# Patient Record
Sex: MALE | Race: WHITE | NOT HISPANIC OR LATINO | Employment: UNEMPLOYED | URBAN - METROPOLITAN AREA
[De-identification: names, ages, dates, MRNs, and addresses within clinical notes are randomized per-mention and may not be internally consistent; named-entity substitution may affect disease eponyms.]

---

## 2019-03-19 ENCOUNTER — APPOINTMENT (OUTPATIENT)
Dept: RADIOLOGY | Facility: CLINIC | Age: 59
End: 2019-03-19
Payer: COMMERCIAL

## 2019-03-19 ENCOUNTER — OFFICE VISIT (OUTPATIENT)
Dept: OBGYN CLINIC | Facility: CLINIC | Age: 59
End: 2019-03-19
Payer: COMMERCIAL

## 2019-03-19 VITALS
DIASTOLIC BLOOD PRESSURE: 75 MMHG | SYSTOLIC BLOOD PRESSURE: 199 MMHG | WEIGHT: 315 LBS | BODY MASS INDEX: 47.74 KG/M2 | HEIGHT: 68 IN | HEART RATE: 68 BPM

## 2019-03-19 DIAGNOSIS — G89.29 CHRONIC PAIN OF RIGHT KNEE: ICD-10-CM

## 2019-03-19 DIAGNOSIS — M25.561 CHRONIC PAIN OF RIGHT KNEE: ICD-10-CM

## 2019-03-19 DIAGNOSIS — M25.562 CHRONIC PAIN OF LEFT KNEE: ICD-10-CM

## 2019-03-19 DIAGNOSIS — G89.29 CHRONIC PAIN OF LEFT KNEE: ICD-10-CM

## 2019-03-19 DIAGNOSIS — M17.12 PRIMARY OSTEOARTHRITIS OF LEFT KNEE: Primary | ICD-10-CM

## 2019-03-19 DIAGNOSIS — M17.11 PRIMARY OSTEOARTHRITIS OF RIGHT KNEE: ICD-10-CM

## 2019-03-19 PROCEDURE — 20610 DRAIN/INJ JOINT/BURSA W/O US: CPT | Performed by: ORTHOPAEDIC SURGERY

## 2019-03-19 PROCEDURE — 73562 X-RAY EXAM OF KNEE 3: CPT

## 2019-03-19 PROCEDURE — 99203 OFFICE O/P NEW LOW 30 MIN: CPT | Performed by: ORTHOPAEDIC SURGERY

## 2019-03-19 RX ORDER — BUPIVACAINE HYDROCHLORIDE 2.5 MG/ML
4 INJECTION, SOLUTION INFILTRATION; PERINEURAL
Status: COMPLETED | OUTPATIENT
Start: 2019-03-19 | End: 2019-03-19

## 2019-03-19 RX ORDER — ASPIRIN 81 MG/1
81 TABLET ORAL DAILY
COMMUNITY

## 2019-03-19 RX ORDER — METHYLPREDNISOLONE ACETATE 40 MG/ML
1 INJECTION, SUSPENSION INTRA-ARTICULAR; INTRALESIONAL; INTRAMUSCULAR; SOFT TISSUE
Status: COMPLETED | OUTPATIENT
Start: 2019-03-19 | End: 2019-03-19

## 2019-03-19 RX ORDER — DICLOFENAC SODIUM 75 MG/1
75 TABLET, DELAYED RELEASE ORAL 2 TIMES DAILY WITH MEALS
Refills: 0 | COMMUNITY
Start: 2019-03-05 | End: 2019-08-27

## 2019-03-19 RX ORDER — ATENOLOL 50 MG/1
50 TABLET ORAL DAILY
Refills: 5 | COMMUNITY
Start: 2019-02-08 | End: 2020-10-25

## 2019-03-19 RX ORDER — ATORVASTATIN CALCIUM 20 MG/1
20 TABLET, FILM COATED ORAL
Refills: 5 | COMMUNITY
Start: 2019-03-07 | End: 2020-10-25

## 2019-03-19 RX ORDER — LISINOPRIL 10 MG/1
10 TABLET ORAL DAILY
Refills: 6 | COMMUNITY
Start: 2019-02-08 | End: 2020-10-25

## 2019-03-19 RX ADMIN — BUPIVACAINE HYDROCHLORIDE 4 ML: 2.5 INJECTION, SOLUTION INFILTRATION; PERINEURAL at 09:04

## 2019-03-19 RX ADMIN — METHYLPREDNISOLONE ACETATE 1 ML: 40 INJECTION, SUSPENSION INTRA-ARTICULAR; INTRALESIONAL; INTRAMUSCULAR; SOFT TISSUE at 09:02

## 2019-03-19 RX ADMIN — METHYLPREDNISOLONE ACETATE 1 ML: 40 INJECTION, SUSPENSION INTRA-ARTICULAR; INTRALESIONAL; INTRAMUSCULAR; SOFT TISSUE at 09:04

## 2019-03-19 RX ADMIN — BUPIVACAINE HYDROCHLORIDE 4 ML: 2.5 INJECTION, SOLUTION INFILTRATION; PERINEURAL at 09:02

## 2019-03-19 NOTE — PATIENT INSTRUCTIONS

## 2019-03-19 NOTE — PROGRESS NOTES
Patient Name:  Dasha Cho  MRN:  [id number]    Assessment & Plan     Bilateral Knee Pain, Severe Osteoarthritis    1  Explained to the patient the conservative treatments for above mentioned diagnosis which are intermittent cortisone and/or visco injections and the use of NSAID's  The last resort option is a total knee arthroplasty  He understood and all questions were answered  2  He was given bilateral cortisone injections today in the office for pain relief  He will continue to use Diclofenac as instructed for pain relief  3  If symptoms persist, he will call office to set up Synvisc One injections  4  He will follow up on an as needed basis  Chief Complaint     Bilateral Knee Pain      History of the Present Illness     Dasha Cho is a 62 y o  male presents today for an initial evaluation of his bilateral knees  Patient states that he has been experiencing chronic knee pain for about 5 years now, worse over the past 2 months without any certain mechanism of injury  He points to the medial aspect of both knees as the main source of his pain  Symptoms are worse with prolonged ambulation and standing  Patient notes that he has a history of a medial menisectomy performed about 6 years ago in his right knee  He states that he has also had injections, but cannot remember the type or how long ago it was   Is currently taking Diclofenac for pain relief  Denies numbness and tingling, fevers or chills    Physical Exam     BP (!) 199/75   Pulse 68   Ht 5' 8" (1 727 m)   Wt (!) 178 kg (393 lb)   BMI 59 76 kg/m²     Right knee:  Soft tissue swelling:  None  Effusion:  None  Tenderness to palpation:  Medial Joint Line  Range of motion:   Extension:  0   Flexion:  110  Lachman test:  Stable  Valgus stress:  Stable  Varus stress:  Stable  Posterior drawer test:  Stable  René's test:  Negative    Left  knee:  Soft tissue swelling:  None  Effusion:  None  Tenderness to palpation:  Medial Joint Line  Range of motion:   Extension:  0   Flexion:  110  Lachman test:  Stable  Valgus stress:  Stable  Varus stress:  Stable  Posterior drawer test:  Stable  René's test:  Negative      Eyes:  Anicteric sclerae  Neck:  Supple  Lungs:  Unlabored breathing  Cardiovascular:  Capillary refill is less than 2 seconds  Skin:  Intact without erythema  Neurologic:  Sensation intact to light touch  Psychiatric:  Mood and affect are appropriate      Large joint arthrocentesis: R knee  Date/Time: 3/19/2019 9:02 AM  Consent given by: patient  Site marked: site marked  Timeout: Immediately prior to procedure a time out was called to verify the correct patient, procedure, equipment, support staff and site/side marked as required   Supporting Documentation  Indications: pain and diagnostic evaluation   Procedure Details  Location: knee - R knee  Preparation: Patient was prepped and draped in the usual sterile fashion  Needle size: 22 G  Ultrasound guidance: no  Approach: anterolateral  Medications administered: 1 mL methylPREDNISolone acetate 40 mg/mL; 4 mL bupivacaine 0 25 %    Patient tolerance: patient tolerated the procedure well with no immediate complications  Dressing:  Sterile dressing applied    Large joint arthrocentesis: L knee  Date/Time: 3/19/2019 9:04 AM  Consent given by: patient  Site marked: site marked  Timeout: Immediately prior to procedure a time out was called to verify the correct patient, procedure, equipment, support staff and site/side marked as required   Supporting Documentation  Indications: pain and diagnostic evaluation   Procedure Details  Location: knee - L knee  Preparation: Patient was prepped and draped in the usual sterile fashion  Needle size: 22 G  Ultrasound guidance: no  Approach: anterolateral  Medications administered: 1 mL methylPREDNISolone acetate 40 mg/mL; 4 mL bupivacaine 0 25 %    Patient tolerance: patient tolerated the procedure well with no immediate complications  Dressing:  Sterile dressing applied          Data Review     I have personally reviewed pertinent films in PACS, and my interpretation follows:    X Ray Left Knee: Severe medial and patellofemoral osteoarthritis    X ray Right Knee: Severe medial and patellofemoral osteoarthritis  No past medical history on file  No past surgical history on file  No Known Allergies    Current Outpatient Medications on File Prior to Visit   Medication Sig Dispense Refill    atenolol (TENORMIN) 50 mg tablet Take 50 mg by mouth daily  5    atorvastatin (LIPITOR) 20 mg tablet Take 20 mg by mouth daily at bedtime  5    diclofenac (VOLTAREN) 75 mg EC tablet Take 75 mg by mouth 2 (two) times a day with meals  0    lisinopril (ZESTRIL) 10 mg tablet Take 10 mg by mouth daily  6     No current facility-administered medications on file prior to visit  Social History     Tobacco Use    Smoking status: Current Every Day Smoker    Smokeless tobacco: Never Used   Substance Use Topics    Alcohol use: Not on file    Drug use: Not on file       No family history on file  Review of Systems     As stated in the HPI  All other systems were reviewed and are negative        Scribe Attestation    I,:   Tanna Ocampo am acting as a scribe while in the presence of the attending physician :        I,:   Savana Davis MD personally performed the services described in this documentation    as scribed in my presence :

## 2019-04-09 ENCOUNTER — OFFICE VISIT (OUTPATIENT)
Dept: OBGYN CLINIC | Facility: CLINIC | Age: 59
End: 2019-04-09
Payer: COMMERCIAL

## 2019-04-09 VITALS
HEART RATE: 64 BPM | BODY MASS INDEX: 59.76 KG/M2 | SYSTOLIC BLOOD PRESSURE: 147 MMHG | DIASTOLIC BLOOD PRESSURE: 71 MMHG | HEIGHT: 68 IN

## 2019-04-09 DIAGNOSIS — M17.0 BILATERAL PRIMARY OSTEOARTHRITIS OF KNEE: Primary | ICD-10-CM

## 2019-04-09 PROCEDURE — 99213 OFFICE O/P EST LOW 20 MIN: CPT | Performed by: ORTHOPAEDIC SURGERY

## 2019-04-09 RX ORDER — CYCLOBENZAPRINE HCL 10 MG
10 TABLET ORAL 3 TIMES DAILY PRN
Qty: 20 TABLET | Refills: 0 | Status: SHIPPED | OUTPATIENT
Start: 2019-04-09 | End: 2020-10-27

## 2019-04-15 DIAGNOSIS — M17.0 BILATERAL PRIMARY OSTEOARTHRITIS OF KNEE: Primary | ICD-10-CM

## 2019-04-15 RX ORDER — MELOXICAM 15 MG/1
15 TABLET ORAL DAILY
Qty: 30 TABLET | Refills: 0 | Status: SHIPPED | OUTPATIENT
Start: 2019-04-15 | End: 2019-05-19 | Stop reason: SDUPTHER

## 2019-05-19 DIAGNOSIS — M17.0 BILATERAL PRIMARY OSTEOARTHRITIS OF KNEE: ICD-10-CM

## 2019-05-20 RX ORDER — MELOXICAM 15 MG/1
TABLET ORAL
Qty: 30 TABLET | Refills: 0 | Status: SHIPPED | OUTPATIENT
Start: 2019-05-20 | End: 2019-06-19 | Stop reason: SDUPTHER

## 2019-06-19 DIAGNOSIS — M17.0 BILATERAL PRIMARY OSTEOARTHRITIS OF KNEE: ICD-10-CM

## 2019-06-19 RX ORDER — MELOXICAM 15 MG/1
TABLET ORAL
Qty: 30 TABLET | Refills: 0 | Status: SHIPPED | OUTPATIENT
Start: 2019-06-19 | End: 2019-07-17 | Stop reason: SDUPTHER

## 2019-07-17 DIAGNOSIS — M17.0 BILATERAL PRIMARY OSTEOARTHRITIS OF KNEE: ICD-10-CM

## 2019-07-17 RX ORDER — MELOXICAM 15 MG/1
TABLET ORAL
Qty: 30 TABLET | Refills: 0 | Status: SHIPPED | OUTPATIENT
Start: 2019-07-17 | End: 2019-08-27

## 2019-08-27 ENCOUNTER — OFFICE VISIT (OUTPATIENT)
Dept: OBGYN CLINIC | Facility: CLINIC | Age: 59
End: 2019-08-27
Payer: COMMERCIAL

## 2019-08-27 VITALS
HEIGHT: 67 IN | HEART RATE: 47 BPM | BODY MASS INDEX: 49.44 KG/M2 | DIASTOLIC BLOOD PRESSURE: 71 MMHG | WEIGHT: 315 LBS | SYSTOLIC BLOOD PRESSURE: 153 MMHG

## 2019-08-27 DIAGNOSIS — M17.0 BILATERAL PRIMARY OSTEOARTHRITIS OF KNEE: Primary | ICD-10-CM

## 2019-08-27 PROCEDURE — 99213 OFFICE O/P EST LOW 20 MIN: CPT | Performed by: ORTHOPAEDIC SURGERY

## 2019-08-27 PROCEDURE — 20610 DRAIN/INJ JOINT/BURSA W/O US: CPT | Performed by: ORTHOPAEDIC SURGERY

## 2019-08-27 RX ORDER — LIDOCAINE HYDROCHLORIDE 10 MG/ML
4 INJECTION, SOLUTION INFILTRATION; PERINEURAL
Status: COMPLETED | OUTPATIENT
Start: 2019-08-27 | End: 2019-08-27

## 2019-08-27 RX ORDER — MELOXICAM 15 MG/1
15 TABLET ORAL DAILY
Qty: 60 TABLET | Refills: 0 | Status: SHIPPED | OUTPATIENT
Start: 2019-08-27 | End: 2019-10-24 | Stop reason: SDUPTHER

## 2019-08-27 RX ORDER — METHYLPREDNISOLONE ACETATE 40 MG/ML
1 INJECTION, SUSPENSION INTRA-ARTICULAR; INTRALESIONAL; INTRAMUSCULAR; SOFT TISSUE
Status: COMPLETED | OUTPATIENT
Start: 2019-08-27 | End: 2019-08-27

## 2019-08-27 RX ADMIN — LIDOCAINE HYDROCHLORIDE 4 ML: 10 INJECTION, SOLUTION INFILTRATION; PERINEURAL at 11:53

## 2019-08-27 RX ADMIN — METHYLPREDNISOLONE ACETATE 1 ML: 40 INJECTION, SUSPENSION INTRA-ARTICULAR; INTRALESIONAL; INTRAMUSCULAR; SOFT TISSUE at 11:53

## 2019-08-27 NOTE — PROGRESS NOTES
Patient Name:  Marifer Clarke  MRN:  [id number]    Assessment & Plan     Bilateral knee DJD  1  Due to patient's Visco supplementation approval being rejected, we will provide him with corticosteroid injections bilaterally  Instructions were provided in office, and tolerated well  2  Renewal for meloxicam was placed in office today and sent to patient's pharmacy  3  I would be happy to see the patient back in office after he undergoes gastric bypass surgery in December      Subjective     Patient is a 51-year-old male who presents the office today for a follow-up evaluation of his bilateral knees  He last received corticosteroid injections in March of 2019  He states this provided him about 2 months of relief  Patient was undergoing the process for approval for viscosupplementation  He received a phone call recently that stated he was unable to receive these injections, as insurance would not approve them  He notes his pain is to the medial aspect of his knee and at this point in time is constant  He ambulates with the assistance of a walker  He notes no new incidence of injury  Patient states he has been taking meloxicam for relief however he has run out of this medication  Patient is likely undergoing gastric bypass surgery in December  General ROS:  Negative for fever or chills  Neurological ROS:  Negative for numbness or tingling        Objective     /71   Pulse (!) 47   Ht 5' 6 5" (1 689 m)   Wt (!) 171 kg (377 lb)   BMI 59 94 kg/m²     Right knee:  Soft tissue swelling: None  Effusion: None  Tenderness to palpation: medial joint line   Range of motion:  Extension: 0, reproduces pain   Flexion: 110  Lachman test: Stable  Valgus stress: Stable  Varus stress: Stable  Posterior drawer test: Stable  René's test: Negative  Patellar grind test: Negative    Left  knee:  Soft tissue swelling: None  Effusion: None  Tenderness to palpation: medial joint line   Range of motion:  Extension: 0, reproduces pain   Flexion: 110  Lachman test: Stable  Valgus stress: Stable  Varus stress: Stable  Posterior drawer test: Stable  René's test: Negative  Patellar grind test: Negative    Constitutional: Well-developed and well-nourished  Eyes: Anicteric sclerae  Lungs: Unlabored breathing  Cardiovascular: Capillary refill is less than 2 seconds  Skin: Intact without erythema  Neurologic: Sensation intact to light touch  Psychiatric: Mood and affect are appropriate        Large joint arthrocentesis: bilateral knee  Date/Time: 8/27/2019 11:53 AM  Consent given by: patient  Site marked: site marked  Timeout: Immediately prior to procedure a time out was called to verify the correct patient, procedure, equipment, support staff and site/side marked as required   Supporting Documentation  Indications: pain   Procedure Details  Location: knee - bilateral knee  Preparation: Patient was prepped and draped in the usual sterile fashion  Ultrasound guidance: no    Medications (Right): 4 mL lidocaine 1 %; 1 mL methylPREDNISolone acetate 40 mg/mLMedications (Left): 4 mL lidocaine 1 %; 1 mL methylPREDNISolone acetate 40 mg/mL   Patient tolerance: patient tolerated the procedure well with no immediate complications  Dressing:  Sterile dressing applied          Social History     Tobacco Use    Smoking status: Current Every Day Smoker    Smokeless tobacco: Never Used   Substance Use Topics    Alcohol use: Not on file    Drug use: Not on file       Scribe Attestation    I,:   Danell Mohs, MA am acting as a scribe while in the presence of the attending physician :        I,:   Lisandro Suarez MD personally performed the services described in this documentation    as scribed in my presence :

## 2019-10-23 ENCOUNTER — TELEPHONE (OUTPATIENT)
Dept: OBGYN CLINIC | Facility: HOSPITAL | Age: 59
End: 2019-10-23

## 2019-10-23 NOTE — TELEPHONE ENCOUNTER
Pt contacted Call Center requested refill of their medication  Medication Name:  Meloxicam     Dosage of Med:  15 mg     Frequency of Med:  Once a day     Remaining Medication:  4 left     Pharmacy and Location:  [facility] on [address] [place] [address] in [place]       Pt  Preferred Callback Phone Number:  [phone]    Thank you

## 2019-10-24 DIAGNOSIS — M17.0 BILATERAL PRIMARY OSTEOARTHRITIS OF KNEE: ICD-10-CM

## 2019-10-24 DIAGNOSIS — M17.0 PRIMARY OSTEOARTHRITIS OF BOTH KNEES: Primary | ICD-10-CM

## 2019-10-24 RX ORDER — MELOXICAM 15 MG/1
15 TABLET ORAL DAILY
Qty: 60 TABLET | Refills: 0 | Status: SHIPPED | OUTPATIENT
Start: 2019-10-24 | End: 2019-12-16 | Stop reason: SDUPTHER

## 2019-12-16 DIAGNOSIS — M17.0 BILATERAL PRIMARY OSTEOARTHRITIS OF KNEE: ICD-10-CM

## 2019-12-16 RX ORDER — MELOXICAM 15 MG/1
TABLET ORAL
Qty: 30 TABLET | Refills: 1 | Status: SHIPPED | OUTPATIENT
Start: 2019-12-16 | End: 2020-11-30 | Stop reason: SDUPTHER

## 2020-10-25 DIAGNOSIS — Z76.0 ENCOUNTER FOR ISSUE OF REPEAT PRESCRIPTION: ICD-10-CM

## 2020-10-25 DIAGNOSIS — I10 ESSENTIAL (PRIMARY) HYPERTENSION: ICD-10-CM

## 2020-10-25 RX ORDER — ATENOLOL 50 MG/1
TABLET ORAL
Qty: 90 TABLET | Refills: 0 | Status: SHIPPED | OUTPATIENT
Start: 2020-10-25 | End: 2021-01-23

## 2020-10-25 RX ORDER — LISINOPRIL 10 MG/1
TABLET ORAL
Qty: 90 TABLET | Refills: 0 | Status: SHIPPED | OUTPATIENT
Start: 2020-10-25 | End: 2021-01-23

## 2020-10-25 RX ORDER — ATORVASTATIN CALCIUM 20 MG/1
TABLET, FILM COATED ORAL
Qty: 90 TABLET | Refills: 0 | Status: SHIPPED | OUTPATIENT
Start: 2020-10-25 | End: 2021-01-23

## 2020-10-27 ENCOUNTER — OFFICE VISIT (OUTPATIENT)
Dept: FAMILY MEDICINE CLINIC | Facility: CLINIC | Age: 60
End: 2020-10-27
Payer: COMMERCIAL

## 2020-10-27 VITALS
TEMPERATURE: 97.1 F | BODY MASS INDEX: 49.44 KG/M2 | HEART RATE: 56 BPM | SYSTOLIC BLOOD PRESSURE: 124 MMHG | OXYGEN SATURATION: 99 % | WEIGHT: 315 LBS | HEIGHT: 67 IN | DIASTOLIC BLOOD PRESSURE: 80 MMHG

## 2020-10-27 DIAGNOSIS — S81.802A OPEN WOUND OF BOTH LOWER EXTREMITIES WITH COMPLICATION, INITIAL ENCOUNTER: ICD-10-CM

## 2020-10-27 DIAGNOSIS — E78.2 MIXED HYPERLIPIDEMIA: ICD-10-CM

## 2020-10-27 DIAGNOSIS — R06.00 DYSPNEA ON EXERTION: ICD-10-CM

## 2020-10-27 DIAGNOSIS — G47.33 OSA (OBSTRUCTIVE SLEEP APNEA): ICD-10-CM

## 2020-10-27 DIAGNOSIS — I87.2 VENOUS INSUFFICIENCY: ICD-10-CM

## 2020-10-27 DIAGNOSIS — E66.01 MORBID OBESITY (HCC): ICD-10-CM

## 2020-10-27 DIAGNOSIS — Z23 NEEDS FLU SHOT: ICD-10-CM

## 2020-10-27 DIAGNOSIS — E55.9 VITAMIN D DEFICIENCY: ICD-10-CM

## 2020-10-27 DIAGNOSIS — M17.0 PRIMARY OSTEOARTHRITIS OF BOTH KNEES: ICD-10-CM

## 2020-10-27 DIAGNOSIS — I10 ESSENTIAL HYPERTENSION: Primary | ICD-10-CM

## 2020-10-27 DIAGNOSIS — M17.0 OSTEOARTHRITIS OF BOTH KNEES, UNSPECIFIED OSTEOARTHRITIS TYPE: ICD-10-CM

## 2020-10-27 DIAGNOSIS — R26.2 AMBULATORY DYSFUNCTION: ICD-10-CM

## 2020-10-27 DIAGNOSIS — S81.801A OPEN WOUND OF BOTH LOWER EXTREMITIES WITH COMPLICATION, INITIAL ENCOUNTER: ICD-10-CM

## 2020-10-27 DIAGNOSIS — F17.200 TOBACCO DEPENDENCE: ICD-10-CM

## 2020-10-27 DIAGNOSIS — R60.0 BILATERAL LOWER EXTREMITY EDEMA: ICD-10-CM

## 2020-10-27 PROBLEM — L03.90 CHRONIC CELLULITIS: Status: ACTIVE | Noted: 2019-07-19

## 2020-10-27 PROCEDURE — 3725F SCREEN DEPRESSION PERFORMED: CPT | Performed by: FAMILY MEDICINE

## 2020-10-27 PROCEDURE — 90682 RIV4 VACC RECOMBINANT DNA IM: CPT

## 2020-10-27 PROCEDURE — 99214 OFFICE O/P EST MOD 30 MIN: CPT | Performed by: FAMILY MEDICINE

## 2020-10-27 PROCEDURE — 3079F DIAST BP 80-89 MM HG: CPT | Performed by: FAMILY MEDICINE

## 2020-10-27 PROCEDURE — 3074F SYST BP LT 130 MM HG: CPT | Performed by: FAMILY MEDICINE

## 2020-10-27 PROCEDURE — 3008F BODY MASS INDEX DOCD: CPT | Performed by: FAMILY MEDICINE

## 2020-10-27 PROCEDURE — 90471 IMMUNIZATION ADMIN: CPT

## 2020-11-30 DIAGNOSIS — M17.0 BILATERAL PRIMARY OSTEOARTHRITIS OF KNEE: ICD-10-CM

## 2020-11-30 RX ORDER — MELOXICAM 15 MG/1
15 TABLET ORAL DAILY
Qty: 30 TABLET | Refills: 1 | Status: SHIPPED | OUTPATIENT
Start: 2020-11-30 | End: 2021-01-31

## 2020-12-07 DIAGNOSIS — R09.81 HEAD CONGESTION: Primary | ICD-10-CM

## 2020-12-07 RX ORDER — AZITHROMYCIN 250 MG/1
TABLET, FILM COATED ORAL
Qty: 6 TABLET | Refills: 0 | Status: SHIPPED | OUTPATIENT
Start: 2020-12-07 | End: 2020-12-12

## 2021-01-05 PROBLEM — M17.0 OSTEOARTHRITIS OF BOTH KNEES: Status: RESOLVED | Noted: 2019-05-07 | Resolved: 2021-01-05

## 2021-01-05 PROBLEM — L03.90 CHRONIC CELLULITIS: Status: RESOLVED | Noted: 2019-07-19 | Resolved: 2021-01-05

## 2021-01-13 ENCOUNTER — OFFICE VISIT (OUTPATIENT)
Dept: FAMILY MEDICINE CLINIC | Facility: CLINIC | Age: 61
End: 2021-01-13
Payer: COMMERCIAL

## 2021-01-13 VITALS
WEIGHT: 315 LBS | OXYGEN SATURATION: 96 % | HEART RATE: 72 BPM | RESPIRATION RATE: 24 BRPM | HEIGHT: 67 IN | TEMPERATURE: 98.7 F | SYSTOLIC BLOOD PRESSURE: 128 MMHG | DIASTOLIC BLOOD PRESSURE: 70 MMHG | BODY MASS INDEX: 49.44 KG/M2

## 2021-01-13 DIAGNOSIS — I83.029 VENOUS STASIS ULCERS OF BOTH LOWER EXTREMITIES (HCC): ICD-10-CM

## 2021-01-13 DIAGNOSIS — E66.01 MORBID OBESITY (HCC): ICD-10-CM

## 2021-01-13 DIAGNOSIS — L97.929 VENOUS STASIS ULCERS OF BOTH LOWER EXTREMITIES (HCC): ICD-10-CM

## 2021-01-13 DIAGNOSIS — R60.0 BILATERAL LOWER EXTREMITY EDEMA: ICD-10-CM

## 2021-01-13 DIAGNOSIS — L97.919 VENOUS STASIS ULCERS OF BOTH LOWER EXTREMITIES (HCC): ICD-10-CM

## 2021-01-13 DIAGNOSIS — E78.2 MIXED HYPERLIPIDEMIA: ICD-10-CM

## 2021-01-13 DIAGNOSIS — M17.0 PRIMARY OSTEOARTHRITIS OF BOTH KNEES: ICD-10-CM

## 2021-01-13 DIAGNOSIS — I83.019 VENOUS STASIS ULCERS OF BOTH LOWER EXTREMITIES (HCC): ICD-10-CM

## 2021-01-13 DIAGNOSIS — E55.9 VITAMIN D DEFICIENCY: ICD-10-CM

## 2021-01-13 DIAGNOSIS — G47.33 OSA (OBSTRUCTIVE SLEEP APNEA): ICD-10-CM

## 2021-01-13 DIAGNOSIS — I10 ESSENTIAL HYPERTENSION: Primary | ICD-10-CM

## 2021-01-13 PROCEDURE — 99214 OFFICE O/P EST MOD 30 MIN: CPT | Performed by: FAMILY MEDICINE

## 2021-01-13 RX ORDER — FUROSEMIDE 20 MG/1
TABLET ORAL
Qty: 15 TABLET | Refills: 3 | Status: SHIPPED | OUTPATIENT
Start: 2021-01-13 | End: 2021-05-12

## 2021-01-13 NOTE — ASSESSMENT & PLAN NOTE
Pt takes meloxicam 15 mg qd and has seen Dr Shawn Barnes  Pt needs to lose wt before TKRs can be done

## 2021-01-13 NOTE — ASSESSMENT & PLAN NOTE
Not using CPAP due to feeling like smothered with mask  Discussed referral to Pulm for other options for tx

## 2021-01-13 NOTE — ASSESSMENT & PLAN NOTE
Pt has it chronically and has open areas b l  Will start lasix 20 3 days a week  And pt needs to lose wt  Will refer to [facility]

## 2021-01-13 NOTE — PROGRESS NOTES
BMI Counseling: Body mass index is 67 57 kg/m²  The BMI is above normal  Nutrition recommendations include decreasing portion sizes, encouraging healthy choices of fruits and vegetables, consuming healthier snacks and moderation in carbohydrate intake  Exercise recommendations include exercising 3-5 times per week  No pharmacotherapy was ordered  Assessment/Plan:         Problem List Items Addressed This Visit        Respiratory    CASSIE (obstructive sleep apnea)     Not using CPAP due to feeling like smothered with mask  Discussed referral to Pulm for other options for tx  Cardiovascular and Mediastinum    Essential hypertension - Primary     BP good  Cont current meds  Inc wt loss  Will check labs  Relevant Medications    furosemide (LASIX) 20 mg tablet    Other Relevant Orders    Comprehensive metabolic panel    CBC and differential       Musculoskeletal and Integument    Venous stasis ulcers of both lower extremities ([facility] [address] )     Refer to [facility]  Relevant Orders    Ambulatory referral to Wound Care    Primary osteoarthritis of both knees     Pt takes meloxicam 15 mg qd and has seen Dr Víctor Andrew  Pt needs to lose wt before TKRs can be done  Other    Vitamin D deficiency     Recheck level  Not on meds  Relevant Orders    Vitamin D 25 hydroxy    Morbid obesity ([facility] [address] )     Pt needs to lose wt  Was following up with Bariatric Surg but insurance changed  Mixed hyperlipidemia     Recheck lipids and LFTs  Cont atorvastatin 20 mg qhs  Relevant Orders    Lipid panel    Bilateral lower extremity edema     Pt has it chronically and has open areas b l  Will start lasix 20 3 days a week  And pt needs to lose wt  Will refer to [facility]  Relevant Medications    furosemide (LASIX) 20 mg tablet            Subjective:      Patient ID: Jerri Spatz is a 61 y o  male  Pt here for f/u HTN, HL, Vit D def, OA, CASSIE, Morbid Obesity  Doing ok  No cp   Gets mild sob when exerts himself  Worse when wt is up  Uses walker to ambulate  Has knee pain b/l and has seen Dr Harvey Krabbe  Has had injections and needs TKRs, but needs to lose wt first  Not using CPAP  The following portions of the patient's history were reviewed and updated as appropriate:   Past Medical History:  He has a past medical history of Essential hypertension, Hyperlipidemia, Lower extremity edema, Obesity, CASSIE (obstructive sleep apnea), Osteoarthritis of both knees, PSVT (paroxysmal supraventricular tachycardia) ([address] ), and Vitamin D deficiency  ,  _______________________________________________________________________  Medical Problems:  does not have any pertinent problems on file ,  _______________________________________________________________________  Past Surgical History:   has a past surgical history that includes Knee surgery and Atrial ablation surgery  ,  _______________________________________________________________________  Family History:  family history includes Alzheimer's disease in his mother; Asthma in his sister; Diabetes in his mother; Heart disease in his father ,  _______________________________________________________________________  Social History:   reports that he quit smoking today  His smoking use included cigarettes  He has a 15 00 pack-year smoking history  He has never used smokeless tobacco  He reports current alcohol use  He reports previous drug use ,  _______________________________________________________________________  Allergies:  has No Known Allergies     _______________________________________________________________________  Current Outpatient Medications   Medication Sig Dispense Refill    aspirin (ECOTRIN LOW STRENGTH) 81 mg EC tablet Take 81 mg by mouth daily      atenolol (TENORMIN) 50 mg tablet TAKE 1 TABLET BY MOUTH EVERY DAY 90 tablet 0    atorvastatin (LIPITOR) 20 mg tablet TAKE 1 TABLET BY MOUTH EVERYDAY AT BEDTIME 90 tablet 0    Cyanocobalamin (VITAMIN B-12 PO) Take by mouth      lisinopril (ZESTRIL) 10 mg tablet TAKE 1 TABLET BY MOUTH EVERY DAY 90 tablet 0    meloxicam (MOBIC) 15 mg tablet Take 1 tablet (15 mg total) by mouth daily 30 tablet 1    furosemide (LASIX) 20 mg tablet Take 1 tab every other day  15 tablet 3     No current facility-administered medications for this visit       _______________________________________________________________________  Review of Systems   Constitutional: Negative for fatigue and unexpected weight change  Respiratory: Positive for shortness of breath  Negative for cough  Cardiovascular: Positive for leg swelling  Negative for chest pain  Gastrointestinal: Negative for abdominal pain, constipation, diarrhea and vomiting  Musculoskeletal: Positive for arthralgias and gait problem  Skin:        Legs have blisters and drain   Neurological: Negative for dizziness and headaches  Psychiatric/Behavioral: Negative for dysphoric mood  The patient is not nervous/anxious  Objective:  Vitals:    01/13/21 1027   BP: 128/70   BP Location: Right arm   Patient Position: Sitting   Pulse: 72   Resp: (!) 24   Temp: 98 7 °F (37 1 °C)   SpO2: 96%   Weight: (!) 193 kg (425 lb)   Height: 5' 6 5" (1 689 m)     Body mass index is 67 57 kg/m²  Physical Exam  Vitals signs and nursing note reviewed  Constitutional:       Appearance: Normal appearance  He is well-developed  He is obese  Comments: Walks with walker   Neck:      Musculoskeletal: Normal range of motion and neck supple  Thyroid: No thyromegaly  Cardiovascular:      Rate and Rhythm: Normal rate and regular rhythm  Heart sounds: Normal heart sounds  No murmur  Pulmonary:      Effort: Pulmonary effort is normal  No respiratory distress  Breath sounds: Normal breath sounds  No wheezing  Musculoskeletal:      Right lower leg: Edema present  Left lower leg: Edema present  Lymphadenopathy:      Cervical: No cervical adenopathy  Skin:     Comments: Pt has open areas draining serosanguinous fluid b/l LEs and worse on R    Neurological:      Mental Status: He is alert and oriented to person, place, and time  Cranial Nerves: No cranial nerve deficit  Psychiatric:         Mood and Affect: Mood normal          Behavior: Behavior normal          Thought Content:  Thought content normal          Judgment: Judgment normal

## 2021-01-20 ENCOUNTER — TELEPHONE (OUTPATIENT)
Dept: FAMILY MEDICINE CLINIC | Facility: CLINIC | Age: 61
End: 2021-01-20

## 2021-01-20 NOTE — TELEPHONE ENCOUNTER
Seen one week ago, was told wound care would call him to set up appt  , but they haven't yet  What should he do, or is there a number for them?

## 2021-01-23 DIAGNOSIS — Z76.0 ENCOUNTER FOR ISSUE OF REPEAT PRESCRIPTION: ICD-10-CM

## 2021-01-23 DIAGNOSIS — I10 ESSENTIAL (PRIMARY) HYPERTENSION: ICD-10-CM

## 2021-01-23 RX ORDER — ATORVASTATIN CALCIUM 20 MG/1
TABLET, FILM COATED ORAL
Qty: 90 TABLET | Refills: 0 | Status: SHIPPED | OUTPATIENT
Start: 2021-01-23 | End: 2021-05-02

## 2021-01-23 RX ORDER — ATENOLOL 50 MG/1
TABLET ORAL
Qty: 90 TABLET | Refills: 0 | Status: SHIPPED | OUTPATIENT
Start: 2021-01-23 | End: 2021-05-02

## 2021-01-23 RX ORDER — LISINOPRIL 10 MG/1
TABLET ORAL
Qty: 90 TABLET | Refills: 0 | Status: SHIPPED | OUTPATIENT
Start: 2021-01-23 | End: 2021-05-02

## 2021-01-31 DIAGNOSIS — M17.0 BILATERAL PRIMARY OSTEOARTHRITIS OF KNEE: ICD-10-CM

## 2021-01-31 RX ORDER — MELOXICAM 15 MG/1
TABLET ORAL
Qty: 30 TABLET | Refills: 1 | Status: SHIPPED | OUTPATIENT
Start: 2021-01-31 | End: 2021-04-02

## 2021-03-10 DIAGNOSIS — Z23 ENCOUNTER FOR IMMUNIZATION: ICD-10-CM

## 2021-03-15 ENCOUNTER — TELEPHONE (OUTPATIENT)
Dept: OTHER | Facility: OTHER | Age: 61
End: 2021-03-15

## 2021-03-22 ENCOUNTER — IMMUNIZATIONS (OUTPATIENT)
Dept: FAMILY MEDICINE CLINIC | Facility: HOSPITAL | Age: 61
End: 2021-03-22

## 2021-03-22 DIAGNOSIS — Z23 ENCOUNTER FOR IMMUNIZATION: Primary | ICD-10-CM

## 2021-03-22 PROCEDURE — 0001A SARS-COV-2 / COVID-19 MRNA VACCINE (PFIZER-BIONTECH) 30 MCG: CPT

## 2021-03-22 PROCEDURE — 91300 SARS-COV-2 / COVID-19 MRNA VACCINE (PFIZER-BIONTECH) 30 MCG: CPT

## 2021-04-02 DIAGNOSIS — M17.0 BILATERAL PRIMARY OSTEOARTHRITIS OF KNEE: ICD-10-CM

## 2021-04-02 RX ORDER — MELOXICAM 15 MG/1
TABLET ORAL
Qty: 30 TABLET | Refills: 1 | Status: SHIPPED | OUTPATIENT
Start: 2021-04-02 | End: 2021-05-28

## 2021-04-13 ENCOUNTER — HOSPITAL ENCOUNTER (OUTPATIENT)
Dept: VASCULAR ULTRASOUND | Facility: HOSPITAL | Age: 61
Discharge: HOME | End: 2021-04-13
Payer: MEDICARE

## 2021-04-13 ENCOUNTER — OFFICE VISIT (OUTPATIENT)
Dept: FAMILY MEDICINE CLINIC | Facility: CLINIC | Age: 61
End: 2021-04-13
Payer: MEDICARE

## 2021-04-13 VITALS
SYSTOLIC BLOOD PRESSURE: 120 MMHG | DIASTOLIC BLOOD PRESSURE: 70 MMHG | TEMPERATURE: 97.9 F | HEART RATE: 52 BPM | HEIGHT: 67 IN | WEIGHT: 315 LBS | BODY MASS INDEX: 49.44 KG/M2 | OXYGEN SATURATION: 94 %

## 2021-04-13 DIAGNOSIS — R60.0 BILATERAL LOWER EXTREMITY EDEMA: ICD-10-CM

## 2021-04-13 DIAGNOSIS — E66.01 MORBID OBESITY (HCC): ICD-10-CM

## 2021-04-13 DIAGNOSIS — R60.0 EDEMA OF LEFT UPPER ARM: ICD-10-CM

## 2021-04-13 DIAGNOSIS — I10 ESSENTIAL HYPERTENSION: Primary | ICD-10-CM

## 2021-04-13 DIAGNOSIS — G47.33 OSA (OBSTRUCTIVE SLEEP APNEA): ICD-10-CM

## 2021-04-13 DIAGNOSIS — E78.2 MIXED HYPERLIPIDEMIA: ICD-10-CM

## 2021-04-13 DIAGNOSIS — M17.0 PRIMARY OSTEOARTHRITIS OF BOTH KNEES: ICD-10-CM

## 2021-04-13 DIAGNOSIS — E55.9 VITAMIN D DEFICIENCY: ICD-10-CM

## 2021-04-13 PROBLEM — M79.89 LEFT ARM SWELLING: Status: ACTIVE | Noted: 2021-04-13

## 2021-04-13 PROBLEM — F17.200 TOBACCO DEPENDENCE: Status: RESOLVED | Noted: 2019-05-07 | Resolved: 2021-04-13

## 2021-04-13 PROBLEM — Z87.891 FORMER SMOKER: Status: ACTIVE | Noted: 2021-04-13

## 2021-04-13 PROCEDURE — 93971 EXTREMITY STUDY: CPT

## 2021-04-13 PROCEDURE — 99214 OFFICE O/P EST MOD 30 MIN: CPT | Performed by: FAMILY MEDICINE

## 2021-04-13 PROCEDURE — 93971 EXTREMITY STUDY: CPT | Performed by: SURGERY

## 2021-04-13 RX ORDER — VITAMIN B COMPLEX
TABLET ORAL DAILY
COMMUNITY
End: 2021-06-15 | Stop reason: HOSPADM

## 2021-04-13 RX ORDER — FOLIC ACID 1 MG/1
TABLET ORAL
COMMUNITY
End: 2021-06-15 | Stop reason: HOSPADM

## 2021-04-13 NOTE — ASSESSMENT & PLAN NOTE
Pt has been going to [facility] and open areas almost healed  Pt takes lasix 20 mg qd and going to use compression stockings  Needs to lose weight

## 2021-04-13 NOTE — ASSESSMENT & PLAN NOTE
Not using CPAP due to feeling like smothered with mask  Pt advised to discuss options with Pulmonary

## 2021-04-13 NOTE — ASSESSMENT & PLAN NOTE
Pt takes meloxicam 15 mg qd and has seen Dr Sarah Villalta  Pt needs to lose wt before TKRs can be done

## 2021-04-13 NOTE — PROGRESS NOTES
Assessment/Plan:         Problem List Items Addressed This Visit        Respiratory    CASSIE (obstructive sleep apnea)     Not using CPAP due to feeling like smothered with mask  Pt advised to discuss options with Pulmonary  Cardiovascular and Mediastinum    Essential hypertension - Primary     BP good  Continue current meds  Stressed to pt to lose weight  Has gained 32 lbs since last visit  Musculoskeletal and Integument    Primary osteoarthritis of both knees     Pt takes meloxicam 15 mg qd and has seen Dr Mary Redd  Pt needs to lose wt before TKRs can be done  Other    Vitamin D deficiency     Recheck level  Takes 1000 U qd  Morbid obesity ([address] )     Pt gained 32 lbs since last visit and needs to lose wt  Was following up with Bariatric Surg but insurance changed  Pt needs to check with new insurance  Would benefit from bariatric procedure  Mixed hyperlipidemia     Recheck lipids and LFTs  Cont atorvastatin 20 mg qhs  Edema of left upper arm     Pt has had it for 2-3 wks  No injury or pain  No signs of infection  Will check venous duplex to eval for DVT  Relevant Orders    VAS upper limb venous duplex scan, unilateral/limited    Bilateral lower extremity edema     Pt has been going to [facility] and open areas almost healed  Pt takes lasix 20 mg qd and going to use compression stockings  Needs to lose weight  Subjective:      Patient ID: Dimas Mart is a 61 y o  male  Pt here for f/u HTN, HL, CASSIE, OA knees, Leg edema, Vit D def, Obesity  Doing ok  No cp/sob  No headaches  No cp  Gets sob when exerts himself  Pt stopped smoking in Jan 2021 and has gained 32 lbs  Hasn't been using CPAP  Wakes up a lot  Has been going to [address] and open areas are healing  Pt sees them q wk and gets legs wrapped  Pt has had swelling of L hand and forearm for past 2-3 weeks  No injury or pain  Never had it before         The following portions of the patient's history were reviewed and updated as appropriate:   Past Medical History:  He has a past medical history of Essential hypertension, Hyperlipidemia, Hypertension, Lower extremity edema, Obesity, CASSIE (obstructive sleep apnea), Osteoarthritis of both knees, PSVT (paroxysmal supraventricular tachycardia) ([address] ), and Vitamin D deficiency  ,  _______________________________________________________________________  Medical Problems:  does not have any pertinent problems on file ,  _______________________________________________________________________  Past Surgical History:   has a past surgical history that includes Knee surgery and Atrial ablation surgery  ,  _______________________________________________________________________  Family History:  family history includes Alzheimer's disease in his mother; Asthma in his sister; Diabetes in his mother; Heart disease in his father; No Known Problems in his brother, sister, and sister  ,  _______________________________________________________________________  Social History:   reports that he quit smoking about 2 months ago  His smoking use included cigarettes  He has a 15 00 pack-year smoking history  He has never used smokeless tobacco  He reports current alcohol use  He reports previous drug use ,  _______________________________________________________________________  Allergies:  has No Known Allergies     _______________________________________________________________________  Current Outpatient Medications   Medication Sig Dispense Refill    aspirin (ECOTRIN LOW STRENGTH) 81 mg EC tablet Take 81 mg by mouth daily      atenolol (TENORMIN) 50 mg tablet TAKE 1 TABLET BY MOUTH EVERY DAY 90 tablet 0    atorvastatin (LIPITOR) 20 mg tablet TAKE 1 TABLET BY MOUTH EVERYDAY AT BEDTIME 90 tablet 0    cholecalciferol (VITAMIN D3) 25 mcg (1,000 units) tablet Take by mouth daily      Cyanocobalamin (VITAMIN B-12 PO) Take by mouth      folic acid (FOLVITE) 1 mg tablet  furosemide (LASIX) 20 mg tablet Take 1 tab every other day  15 tablet 3    lisinopril (ZESTRIL) 10 mg tablet TAKE 1 TABLET BY MOUTH EVERY DAY 90 tablet 0    meloxicam (MOBIC) 15 mg tablet TAKE 1 TABLET BY MOUTH EVERY DAY 30 tablet 1     No current facility-administered medications for this visit       _______________________________________________________________________  Review of Systems   Constitutional: Negative for fatigue and unexpected weight change  Respiratory: Positive for shortness of breath  Negative for cough  Cardiovascular: Positive for leg swelling  Negative for chest pain  Gastrointestinal: Negative for abdominal pain, constipation, diarrhea and vomiting  Musculoskeletal: Positive for arthralgias and gait problem  Skin:        Legs have blisters and drain   Neurological: Negative for dizziness and headaches  Psychiatric/Behavioral: Negative for dysphoric mood  The patient is not nervous/anxious  Objective:  Vitals:    04/13/21 1029   BP: 120/70   Pulse: (!) 52   Temp: 97 9 °F (36 6 °C)   SpO2: 94%   Weight: (!) 207 kg (457 lb)   Height: 5' 6 5" (1 689 m)     Body mass index is 72 66 kg/m²  Physical Exam  Vitals signs and nursing note reviewed  Constitutional:       Appearance: Normal appearance  He is well-developed  He is obese  Comments: Walks with walker   Neck:      Musculoskeletal: Normal range of motion and neck supple  Thyroid: No thyromegaly  Cardiovascular:      Rate and Rhythm: Normal rate and regular rhythm  Heart sounds: Normal heart sounds  No murmur  Pulmonary:      Effort: Pulmonary effort is normal  No respiratory distress  Breath sounds: Normal breath sounds  No wheezing  Musculoskeletal:      Right lower leg: Edema present  Left lower leg: Edema present  Comments: L hand and forearm edema, no redness, pain, or warmth   Lymphadenopathy:      Cervical: No cervical adenopathy     Skin:     Comments: Pt has open areas draining serosanguinous fluid b/l LEs and worse on R    Neurological:      Mental Status: He is alert and oriented to person, place, and time  Cranial Nerves: No cranial nerve deficit  Psychiatric:         Mood and Affect: Mood normal          Behavior: Behavior normal          Thought Content:  Thought content normal          Judgment: Judgment normal

## 2021-04-13 NOTE — ASSESSMENT & PLAN NOTE
Pt has had it for 2-3 wks  No injury or pain  No signs of infection  Will check venous duplex to eval for DVT

## 2021-04-13 NOTE — ASSESSMENT & PLAN NOTE
Pt gained 32 lbs since last visit and needs to lose wt  Was following up with Bariatric Surg but insurance changed  Pt needs to check with new insurance  Would benefit from bariatric procedure

## 2021-04-15 ENCOUNTER — IMMUNIZATIONS (OUTPATIENT)
Dept: FAMILY MEDICINE CLINIC | Facility: HOSPITAL | Age: 61
End: 2021-04-15

## 2021-04-15 DIAGNOSIS — Z23 ENCOUNTER FOR IMMUNIZATION: Primary | ICD-10-CM

## 2021-04-15 PROCEDURE — 0002A SARS-COV-2 / COVID-19 MRNA VACCINE (PFIZER-BIONTECH) 30 MCG: CPT

## 2021-04-15 PROCEDURE — 91300 SARS-COV-2 / COVID-19 MRNA VACCINE (PFIZER-BIONTECH) 30 MCG: CPT

## 2021-05-02 DIAGNOSIS — I10 ESSENTIAL (PRIMARY) HYPERTENSION: ICD-10-CM

## 2021-05-02 DIAGNOSIS — Z76.0 ENCOUNTER FOR ISSUE OF REPEAT PRESCRIPTION: ICD-10-CM

## 2021-05-02 RX ORDER — ATENOLOL 50 MG/1
TABLET ORAL
Qty: 90 TABLET | Refills: 0 | Status: SHIPPED | OUTPATIENT
Start: 2021-05-02 | End: 2021-06-15 | Stop reason: HOSPADM

## 2021-05-02 RX ORDER — ATORVASTATIN CALCIUM 20 MG/1
TABLET, FILM COATED ORAL
Qty: 90 TABLET | Refills: 0 | Status: SHIPPED | OUTPATIENT
Start: 2021-05-02 | End: 2021-07-12

## 2021-05-02 RX ORDER — LISINOPRIL 10 MG/1
TABLET ORAL
Qty: 90 TABLET | Refills: 0 | Status: ON HOLD | OUTPATIENT
Start: 2021-05-02 | End: 2021-06-15 | Stop reason: SDUPTHER

## 2021-05-12 DIAGNOSIS — R60.0 BILATERAL LOWER EXTREMITY EDEMA: ICD-10-CM

## 2021-05-12 RX ORDER — FUROSEMIDE 20 MG/1
TABLET ORAL
Qty: 45 TABLET | Refills: 1 | Status: SHIPPED | OUTPATIENT
Start: 2021-05-12 | End: 2021-05-30 | Stop reason: SDUPTHER

## 2021-05-28 DIAGNOSIS — M17.0 BILATERAL PRIMARY OSTEOARTHRITIS OF KNEE: ICD-10-CM

## 2021-05-28 RX ORDER — MELOXICAM 15 MG/1
TABLET ORAL
Qty: 30 TABLET | Refills: 1 | Status: SHIPPED | OUTPATIENT
Start: 2021-05-28 | End: 2021-06-07

## 2021-05-30 ENCOUNTER — APPOINTMENT (EMERGENCY)
Dept: RADIOLOGY | Facility: HOSPITAL | Age: 61
End: 2021-05-30
Payer: MEDICARE

## 2021-05-30 ENCOUNTER — HOSPITAL ENCOUNTER (EMERGENCY)
Facility: HOSPITAL | Age: 61
Discharge: HOME | End: 2021-05-30
Attending: EMERGENCY MEDICINE
Payer: MEDICARE

## 2021-05-30 VITALS
HEART RATE: 66 BPM | OXYGEN SATURATION: 98 % | RESPIRATION RATE: 20 BRPM | TEMPERATURE: 98.5 F | DIASTOLIC BLOOD PRESSURE: 83 MMHG | SYSTOLIC BLOOD PRESSURE: 180 MMHG

## 2021-05-30 DIAGNOSIS — E87.70 VOLUME OVERLOAD: Primary | ICD-10-CM

## 2021-05-30 DIAGNOSIS — R06.00 DYSPNEA: ICD-10-CM

## 2021-05-30 DIAGNOSIS — R60.0 BILATERAL LOWER EXTREMITY EDEMA: ICD-10-CM

## 2021-05-30 LAB
ALBUMIN SERPL BCP-MCNC: 3.3 G/DL (ref 3.5–5)
ALP SERPL-CCNC: 88 U/L (ref 46–116)
ALT SERPL W P-5'-P-CCNC: 19 U/L (ref 12–78)
ANION GAP SERPL CALCULATED.3IONS-SCNC: 2 MMOL/L (ref 4–13)
AST SERPL W P-5'-P-CCNC: 12 U/L (ref 5–45)
ATRIAL RATE: 227 BPM
BASOPHILS # BLD AUTO: 0.05 THOUSANDS/ΜL (ref 0–0.1)
BASOPHILS NFR BLD AUTO: 1 % (ref 0–1)
BILIRUB SERPL-MCNC: 0.38 MG/DL (ref 0.2–1)
BUN SERPL-MCNC: 18 MG/DL (ref 5–25)
CALCIUM ALBUM COR SERPL-MCNC: 9.6 MG/DL (ref 8.3–10.1)
CALCIUM SERPL-MCNC: 9 MG/DL (ref 8.3–10.1)
CHLORIDE SERPL-SCNC: 104 MMOL/L (ref 100–108)
CO2 SERPL-SCNC: 32 MMOL/L (ref 21–32)
CREAT SERPL-MCNC: 1.02 MG/DL (ref 0.6–1.3)
EOSINOPHIL # BLD AUTO: 0.6 THOUSAND/ΜL (ref 0–0.61)
EOSINOPHIL NFR BLD AUTO: 6 % (ref 0–6)
ERYTHROCYTE [DISTWIDTH] IN BLOOD BY AUTOMATED COUNT: 13 % (ref 11.6–15.1)
GFR SERPL CREATININE-BSD FRML MDRD: 80 ML/MIN/1.73SQ M
GLUCOSE SERPL-MCNC: 128 MG/DL (ref 65–140)
HCT VFR BLD AUTO: 40.8 % (ref 36.5–49.3)
HGB BLD-MCNC: 13 G/DL (ref 12–17)
IMM GRANULOCYTES # BLD AUTO: 0.07 THOUSAND/UL (ref 0–0.2)
IMM GRANULOCYTES NFR BLD AUTO: 1 % (ref 0–2)
LYMPHOCYTES # BLD AUTO: 2.02 THOUSANDS/ΜL (ref 0.6–4.47)
LYMPHOCYTES NFR BLD AUTO: 20 % (ref 14–44)
MAGNESIUM SERPL-MCNC: 2.3 MG/DL (ref 1.6–2.6)
MCH RBC QN AUTO: 31.4 PG (ref 26.8–34.3)
MCHC RBC AUTO-ENTMCNC: 31.9 G/DL (ref 31.4–37.4)
MCV RBC AUTO: 99 FL (ref 82–98)
MONOCYTES # BLD AUTO: 0.64 THOUSAND/ΜL (ref 0.17–1.22)
MONOCYTES NFR BLD AUTO: 6 % (ref 4–12)
NEUTROPHILS # BLD AUTO: 6.59 THOUSANDS/ΜL (ref 1.85–7.62)
NEUTS SEG NFR BLD AUTO: 66 % (ref 43–75)
NRBC BLD AUTO-RTO: 0 /100 WBCS
NT-PROBNP SERPL-MCNC: 975 PG/ML
PLATELET # BLD AUTO: 231 THOUSANDS/UL (ref 149–390)
PMV BLD AUTO: 10.2 FL (ref 8.9–12.7)
POTASSIUM SERPL-SCNC: 4.4 MMOL/L (ref 3.5–5.3)
PROT SERPL-MCNC: 8 G/DL (ref 6.4–8.2)
QRS AXIS: 11 DEGREES
QRSD INTERVAL: 78 MS
QT INTERVAL: 448 MS
QTC INTERVAL: 424 MS
RBC # BLD AUTO: 4.14 MILLION/UL (ref 3.88–5.62)
SODIUM SERPL-SCNC: 138 MMOL/L (ref 136–145)
T WAVE AXIS: 45 DEGREES
TROPONIN I SERPL-MCNC: <0.02 NG/ML
VENTRICULAR RATE: 54 BPM
WBC # BLD AUTO: 9.97 THOUSAND/UL (ref 4.31–10.16)

## 2021-05-30 PROCEDURE — 36415 COLL VENOUS BLD VENIPUNCTURE: CPT | Performed by: EMERGENCY MEDICINE

## 2021-05-30 PROCEDURE — 80053 COMPREHEN METABOLIC PANEL: CPT | Performed by: EMERGENCY MEDICINE

## 2021-05-30 PROCEDURE — 83735 ASSAY OF MAGNESIUM: CPT | Performed by: EMERGENCY MEDICINE

## 2021-05-30 PROCEDURE — 85025 COMPLETE CBC W/AUTO DIFF WBC: CPT | Performed by: EMERGENCY MEDICINE

## 2021-05-30 PROCEDURE — 99285 EMERGENCY DEPT VISIT HI MDM: CPT | Performed by: EMERGENCY MEDICINE

## 2021-05-30 PROCEDURE — 93010 ELECTROCARDIOGRAM REPORT: CPT | Performed by: INTERNAL MEDICINE

## 2021-05-30 PROCEDURE — 71045 X-RAY EXAM CHEST 1 VIEW: CPT

## 2021-05-30 PROCEDURE — 96374 THER/PROPH/DIAG INJ IV PUSH: CPT

## 2021-05-30 PROCEDURE — 84484 ASSAY OF TROPONIN QUANT: CPT | Performed by: EMERGENCY MEDICINE

## 2021-05-30 PROCEDURE — 83880 ASSAY OF NATRIURETIC PEPTIDE: CPT | Performed by: EMERGENCY MEDICINE

## 2021-05-30 PROCEDURE — 93005 ELECTROCARDIOGRAM TRACING: CPT

## 2021-05-30 PROCEDURE — 99285 EMERGENCY DEPT VISIT HI MDM: CPT

## 2021-05-30 RX ORDER — FUROSEMIDE 20 MG/1
20 TABLET ORAL DAILY
Qty: 30 TABLET | Refills: 0 | Status: SHIPPED | OUTPATIENT
Start: 2021-05-30 | End: 2021-06-15 | Stop reason: HOSPADM

## 2021-05-30 RX ORDER — FUROSEMIDE 10 MG/ML
20 INJECTION INTRAMUSCULAR; INTRAVENOUS ONCE
Status: COMPLETED | OUTPATIENT
Start: 2021-05-30 | End: 2021-05-30

## 2021-05-30 RX ADMIN — FUROSEMIDE 20 MG: 10 INJECTION, SOLUTION INTRAMUSCULAR; INTRAVENOUS at 15:29

## 2021-05-30 NOTE — ED PROVIDER NOTES
History  Chief Complaint   Patient presents with    Shortness of Breath     shortness of breath x 1 week, states chest tightens with movement      78-year-old gentleman comes in for evaluation of shortness of breath  Patient has a history of atrial fibrillation, hypertension, morbid obesity, obstructive sleep apnea  Comes in stating that he has been more short of breath over the past week  States he is not able to do anything without being short of breath  Patient describes his chest as tight but no real chest pain  Patient denies any fever or chills no nausea or vomiting  History provided by:  Patient, spouse and medical records   used: No    Shortness of Breath  Severity:  Moderate  Onset quality:  Gradual  Duration:  7 days  Timing:  Constant  Progression:  Worsening  Chronicity:  New  Context: activity    Ineffective treatments:  None tried  Associated symptoms: no abdominal pain, no chest pain, no cough, no headaches, no hemoptysis, no rash and no wheezing    Risk factors: obesity    Risk factors: no recent alcohol use        Prior to Admission Medications   Prescriptions Last Dose Informant Patient Reported? Taking?    Cyanocobalamin (VITAMIN B-12 PO)   Yes No   Sig: Take by mouth   aspirin (ECOTRIN LOW STRENGTH) 81 mg EC tablet   Yes No   Sig: Take 81 mg by mouth daily   atenolol (TENORMIN) 50 mg tablet   No No   Sig: TAKE 1 TABLET BY MOUTH EVERY DAY   atorvastatin (LIPITOR) 20 mg tablet   No No   Sig: TAKE 1 TABLET BY MOUTH EVERYDAY AT BEDTIME   cholecalciferol (VITAMIN D3) 25 mcg (1,000 units) tablet   Yes No   Sig: Take by mouth daily   folic acid (FOLVITE) 1 mg tablet   Yes No   furosemide (LASIX) 20 mg tablet   No No   Sig: TAKE 1 TABLET BY MOUTH EVERY OTHER DAY   furosemide (LASIX) 20 mg tablet   No No   Sig: Take 1 tablet (20 mg total) by mouth daily   lisinopril (ZESTRIL) 10 mg tablet   No No   Sig: TAKE 1 TABLET BY MOUTH EVERY DAY   meloxicam (MOBIC) 15 mg tablet   No No Sig: TAKE 1 TABLET BY MOUTH EVERY DAY      Facility-Administered Medications: None       Past Medical History:   Diagnosis Date    Essential hypertension     Hyperlipidemia     Hypertension     Lower extremity edema     Obesity     CASSIE (obstructive sleep apnea)     Osteoarthritis of both knees     PSVT (paroxysmal supraventricular tachycardia) (HCC)     Vitamin D deficiency        Past Surgical History:   Procedure Laterality Date    ATRIAL ABLATION SURGERY      KNEE SURGERY         Family History   Problem Relation Age of Onset    Diabetes Mother     Alzheimer's disease Mother     Heart disease Father     Asthma Sister     No Known Problems Brother     No Known Problems Sister     No Known Problems Sister      I have reviewed and agree with the history as documented  E-Cigarette/Vaping    E-Cigarette Use Never User      E-Cigarette/Vaping Substances     Social History     Tobacco Use    Smoking status: Former Smoker     Packs/day:      Years: 15 00     Pack years: 15 00     Types: Cigarettes     Quit date: 2021     Years since quittin 3    Smokeless tobacco: Never Used   Substance Use Topics    Alcohol use: Yes     Frequency: Monthly or less     Drinks per session: 1 or 2     Binge frequency: Never     Comment: [employer] Drug use: Not Currently       Review of Systems   Constitutional: Negative for activity change and appetite change  HENT: Negative for congestion and facial swelling  Eyes: Negative for discharge and redness  Respiratory: Positive for shortness of breath  Negative for cough, hemoptysis and wheezing  Cardiovascular: Negative for chest pain and leg swelling  Gastrointestinal: Negative for abdominal distention, abdominal pain and blood in stool  Endocrine: Negative for cold intolerance and polydipsia  Genitourinary: Negative for difficulty urinating and hematuria  Musculoskeletal: Negative for arthralgias and gait problem     Skin: Negative for color change and rash  Allergic/Immunologic: Negative for food allergies and immunocompromised state  Neurological: Negative for dizziness, seizures and headaches  Hematological: Negative for adenopathy  Does not bruise/bleed easily  Psychiatric/Behavioral: Negative for agitation, confusion and decreased concentration  All other systems reviewed and are negative  Physical Exam  Physical Exam  Vitals signs and nursing note reviewed  Constitutional:       Appearance: He is well-developed  He is not toxic-appearing  HENT:      Head: Normocephalic and atraumatic  Right Ear: Tympanic membrane normal       Left Ear: Tympanic membrane normal       Nose: Nose normal    Eyes:      General: Lids are normal       Conjunctiva/sclera: Conjunctivae normal       Pupils: Pupils are equal, round, and reactive to light  Neck:      Musculoskeletal: Normal range of motion and neck supple  Vascular: No carotid bruit or JVD  Trachea: Trachea normal    Cardiovascular:      Rate and Rhythm: Normal rate and regular rhythm  No extrasystoles are present  Heart sounds: Normal heart sounds  No murmur  Pulmonary:      Effort: Pulmonary effort is normal  No respiratory distress  Breath sounds: Decreased breath sounds present  No wheezing, rhonchi or rales  Chest:      Chest wall: No deformity or tenderness  Abdominal:      General: Bowel sounds are normal       Palpations: Abdomen is soft  Tenderness: There is no abdominal tenderness  There is no guarding or rebound  Musculoskeletal:      Right shoulder: He exhibits normal range of motion, no tenderness, no swelling and no deformity  Cervical back: Normal  He exhibits normal range of motion, no tenderness, no bony tenderness and no deformity  Right lower le+ Edema present  Left lower le+ Edema present  Lymphadenopathy:      Cervical: No cervical adenopathy  Skin:     General: Skin is warm and dry  Neurological:      Mental Status: He is alert and oriented to person, place, and time  Cranial Nerves: No cranial nerve deficit  Sensory: No sensory deficit  Deep Tendon Reflexes: Reflexes are normal and symmetric  Psychiatric:         Speech: Speech normal          Behavior: Behavior normal          Thought Content:  Thought content normal          Judgment: Judgment normal          Vital Signs  ED Triage Vitals   Temperature Pulse Respirations Blood Pressure SpO2   05/30/21 1501 05/30/21 1501 05/30/21 1501 05/30/21 1503 05/30/21 1503   98 5 °F (36 9 °C) 67 20 (!) 180/83 98 %      Temp Source Heart Rate Source Patient Position - Orthostatic VS BP Location FiO2 (%)   05/30/21 1501 05/30/21 1501 -- -- --   Oral Monitor         Pain Score       --                  Vitals:    05/30/21 1501 05/30/21 1503 05/30/21 1558   BP:  (!) 180/83    Pulse: 67  66         Visual Acuity      ED Medications  Medications   furosemide (LASIX) injection 20 mg (20 mg Intravenous Given 5/30/21 1529)       Diagnostic Studies  Results Reviewed     Procedure Component Value Units Date/Time    Magnesium [[id number]]  (Normal) Collected: 05/30/21 1524    Lab Status: Final result Specimen: Blood from Arm, Right Updated: 05/30/21 1606     Magnesium 2 3 mg/dL     NT-BNP PRO [[id number]]  (Abnormal) Collected: 05/30/21 1524    Lab Status: Final result Specimen: Blood from Arm, Right Updated: 05/30/21 1606     NT-proBNP 975 pg/mL     Troponin I [[id number]]  (Normal) Collected: 05/30/21 1524    Lab Status: Final result Specimen: Blood from Arm, Right Updated: 05/30/21 1601     Troponin I <0 02 ng/mL     Comprehensive metabolic panel [[id number]]  (Abnormal) Collected: 05/30/21 1524    Lab Status: Final result Specimen: Blood from Arm, Right Updated: 05/30/21 1559     Sodium 138 mmol/L      Potassium 4 4 mmol/L      Chloride 104 mmol/L      CO2 32 mmol/L      ANION GAP 2 mmol/L      BUN 18 mg/dL      Creatinine 1 02 mg/dL      Glucose 128 mg/dL      Calcium 9 0 mg/dL      Corrected Calcium 9 6 mg/dL      AST 12 U/L      ALT 19 U/L      Alkaline Phosphatase 88 U/L      Total Protein 8 0 g/dL      Albumin 3 3 g/dL      Total Bilirubin 0 38 mg/dL      eGFR 80 ml/min/1 73sq m     Narrative:      [place] guidelines for Chronic Kidney Disease (CKD):     Stage 1 with normal or high GFR (GFR > 90 mL/min/1 73 square meters)    Stage 2 Mild CKD (GFR = 60-89 mL/min/1 73 square meters)    Stage 3A Moderate CKD (GFR = 45-59 mL/min/1 73 square meters)    Stage 3B Moderate CKD (GFR = 30-44 mL/min/1 73 square meters)    Stage 4 Severe CKD (GFR = 15-29 mL/min/1 73 square meters)    Stage 5 End Stage CKD (GFR <15 mL/min/1 73 square meters)  Note: GFR calculation is accurate only with a steady state creatinine    CBC and differential [[id number]]  (Abnormal) Collected: 05/30/21 1524    Lab Status: Final result Specimen: Blood from Arm, Right Updated: 05/30/21 1537     WBC 9 97 Thousand/uL      RBC 4 14 Million/uL      Hemoglobin 13 0 g/dL      Hematocrit 40 8 %      MCV 99 fL      MCH 31 4 pg      MCHC 31 9 g/dL      RDW 13 0 %      MPV 10 2 fL      Platelets 835 Thousands/uL      nRBC 0 /100 WBCs      Neutrophils Relative 66 %      Immat GRANS % 1 %      Lymphocytes Relative 20 %      Monocytes Relative 6 %      Eosinophils Relative 6 %      Basophils Relative 1 %      Neutrophils Absolute 6 59 Thousands/µL      Immature Grans Absolute 0 07 Thousand/uL      Lymphocytes Absolute 2 02 Thousands/µL      Monocytes Absolute 0 64 Thousand/µL      Eosinophils Absolute 0 60 Thousand/µL      Basophils Absolute 0 05 Thousands/µL                  XR chest 1 view portable   Final Result by Kapil Guzman MD (05/30 8057)      Mild pulmonary vascular congestion                    Workstation performed: [id number]                    Procedures  ECG 12 Lead Documentation Only    Date/Time: 5/30/2021 3:11 PM  Performed by: Newton Nassar   Authorized by: Jaymie Schofield DO     Patient location:  ED  Rate:     ECG rate:  54  Rhythm:     Rhythm: atrial fibrillation               ED Course                                           MDM  Number of Diagnoses or Management Options  Dyspnea: new and requires workup  Volume overload: new and requires workup     Amount and/or Complexity of Data Reviewed  Clinical lab tests: ordered and reviewed  Tests in the radiology section of CPT®: ordered and reviewed  Tests in the medicine section of CPT®: ordered and reviewed    Risk of Complications, Morbidity, and/or Mortality  General comments: Patient appears to be in at least early CHF  I did offer him admission hospital at this time  And however  Patient states he did not want to stay in the hospital   Patient has been on Lasix in the past however he has not been taking it  I did give him some IV here and I will prescribe from outpatient  I did strongly urged him to follow-up with his PCP and Cardiology as an outpatient and if his shortness of breath got any worse to please return to the emergency department for further evaluation treatment and admission  Patient Progress  Patient progress: stable      Disposition  Final diagnoses:   Volume overload   Dyspnea     Time reflects when diagnosis was documented in both MDM as applicable and the Disposition within this note     Time User Action Codes Description Comment    5/30/2021  4:46 PM Giovanni Cones K Add [E87 70] Volume overload     5/30/2021  4:47 PM Vickie Schreiber Add [R06 00] Dyspnea     5/30/2021  4:47 PM Giovanni Cones K Add [R60 0] Bilateral lower extremity edema     5/30/2021  4:48 PM Giovanni Cones K Modify [R60 0] Bilateral lower extremity edema       ED Disposition     ED Disposition Condition Date/Time Comment    Discharge Stable Sun May 30, 2021  4:46 PM Dasha Cho discharge to home/self care              Follow-up Information     Follow up With Specialties Details Why Contact Info Additional Claude Frazier MD Family Medicine Schedule an appointment as soon as possible for a visit   [address]  [facility] [address]  [address] [facility] Cardiology Schedule an appointment as soon as possible for a visit   [address] [facility] [address] [phone] [id number] Cardiology [address] [place], [place], [facility] [address]          Current Discharge Medication List      CONTINUE these medications which have CHANGED    Details   furosemide (LASIX) 20 mg tablet Take 1 tablet (20 mg total) by mouth daily  Qty: 30 tablet, Refills: 0    Associated Diagnoses: Bilateral lower extremity edema         CONTINUE these medications which have NOT CHANGED    Details   aspirin (ECOTRIN LOW STRENGTH) 81 mg EC tablet Take 81 mg by mouth daily      atenolol (TENORMIN) 50 mg tablet TAKE 1 TABLET BY MOUTH EVERY DAY  Qty: 90 tablet, Refills: 0    Associated Diagnoses: Encounter for issue of repeat prescription      atorvastatin (LIPITOR) 20 mg tablet TAKE 1 TABLET BY MOUTH EVERYDAY AT BEDTIME  Qty: 90 tablet, Refills: 0    Associated Diagnoses: Encounter for issue of repeat prescription      cholecalciferol (VITAMIN D3) 25 mcg (1,000 units) tablet Take by mouth daily      Cyanocobalamin (VITAMIN B-12 PO) Take by mouth      folic acid (FOLVITE) 1 mg tablet       lisinopril (ZESTRIL) 10 mg tablet TAKE 1 TABLET BY MOUTH EVERY DAY  Qty: 90 tablet, Refills: 0    Associated Diagnoses: Essential (primary) hypertension      meloxicam (MOBIC) 15 mg tablet TAKE 1 TABLET BY MOUTH EVERY DAY  Qty: 30 tablet, Refills: 1    Associated Diagnoses: Bilateral primary osteoarthritis of knee           No discharge procedures on file      PDMP Review     None          ED Provider  Electronically Signed by           Ankita Baer,   05/30/21 3153

## 2021-06-07 ENCOUNTER — HOSPITAL ENCOUNTER (INPATIENT)
Facility: HOSPITAL | Age: 61
LOS: 8 days | Discharge: HOME | DRG: 291 | End: 2021-06-15
Attending: EMERGENCY MEDICINE | Admitting: HOSPITALIST
Payer: MEDICARE

## 2021-06-07 ENCOUNTER — OFFICE VISIT (OUTPATIENT)
Dept: FAMILY MEDICINE CLINIC | Facility: CLINIC | Age: 61
End: 2021-06-07
Payer: MEDICARE

## 2021-06-07 ENCOUNTER — APPOINTMENT (EMERGENCY)
Dept: RADIOLOGY | Facility: HOSPITAL | Age: 61
DRG: 291 | End: 2021-06-07
Payer: MEDICARE

## 2021-06-07 ENCOUNTER — OFFICE VISIT (OUTPATIENT)
Dept: CARDIOLOGY CLINIC | Facility: CLINIC | Age: 61
End: 2021-06-07
Payer: MEDICARE

## 2021-06-07 VITALS
TEMPERATURE: 98.2 F | BODY MASS INDEX: 49.44 KG/M2 | SYSTOLIC BLOOD PRESSURE: 120 MMHG | DIASTOLIC BLOOD PRESSURE: 80 MMHG | RESPIRATION RATE: 16 BRPM | HEART RATE: 72 BPM | WEIGHT: 315 LBS | HEIGHT: 67 IN | OXYGEN SATURATION: 92 %

## 2021-06-07 VITALS
WEIGHT: 315 LBS | HEIGHT: 67 IN | DIASTOLIC BLOOD PRESSURE: 60 MMHG | HEART RATE: 67 BPM | SYSTOLIC BLOOD PRESSURE: 118 MMHG | OXYGEN SATURATION: 92 % | BODY MASS INDEX: 49.44 KG/M2

## 2021-06-07 DIAGNOSIS — G47.33 OSA (OBSTRUCTIVE SLEEP APNEA): ICD-10-CM

## 2021-06-07 DIAGNOSIS — I50.31 ACUTE DIASTOLIC HEART FAILURE (HCC): ICD-10-CM

## 2021-06-07 DIAGNOSIS — I10 ESSENTIAL HYPERTENSION: ICD-10-CM

## 2021-06-07 DIAGNOSIS — R60.0 BILATERAL LOWER EXTREMITY EDEMA: ICD-10-CM

## 2021-06-07 DIAGNOSIS — I10 ESSENTIAL (PRIMARY) HYPERTENSION: ICD-10-CM

## 2021-06-07 DIAGNOSIS — I50.21 ACUTE SYSTOLIC CONGESTIVE HEART FAILURE (HCC): Primary | ICD-10-CM

## 2021-06-07 DIAGNOSIS — I48.0 PAROXYSMAL ATRIAL FIBRILLATION (HCC): ICD-10-CM

## 2021-06-07 DIAGNOSIS — E66.01 MORBID OBESITY (HCC): ICD-10-CM

## 2021-06-07 DIAGNOSIS — I50.9 CHF (CONGESTIVE HEART FAILURE) (HCC): Primary | ICD-10-CM

## 2021-06-07 DIAGNOSIS — I50.89 OTHER HEART FAILURE (HCC): Primary | ICD-10-CM

## 2021-06-07 PROBLEM — I48.91 ATRIAL FIBRILLATION (HCC): Status: ACTIVE | Noted: 2021-06-07

## 2021-06-07 LAB
ALBUMIN SERPL BCP-MCNC: 3.2 G/DL (ref 3.5–5)
ALP SERPL-CCNC: 88 U/L (ref 46–116)
ALT SERPL W P-5'-P-CCNC: 17 U/L (ref 12–78)
ANION GAP SERPL CALCULATED.3IONS-SCNC: 4 MMOL/L (ref 4–13)
AST SERPL W P-5'-P-CCNC: 13 U/L (ref 5–45)
BASOPHILS # BLD AUTO: 0.06 THOUSANDS/ΜL (ref 0–0.1)
BASOPHILS NFR BLD AUTO: 1 % (ref 0–1)
BILIRUB SERPL-MCNC: 0.57 MG/DL (ref 0.2–1)
BUN SERPL-MCNC: 20 MG/DL (ref 5–25)
CALCIUM ALBUM COR SERPL-MCNC: 9.6 MG/DL (ref 8.3–10.1)
CALCIUM SERPL-MCNC: 9 MG/DL (ref 8.3–10.1)
CHLORIDE SERPL-SCNC: 101 MMOL/L (ref 100–108)
CO2 SERPL-SCNC: 32 MMOL/L (ref 21–32)
CREAT SERPL-MCNC: 0.97 MG/DL (ref 0.6–1.3)
EOSINOPHIL # BLD AUTO: 0.54 THOUSAND/ΜL (ref 0–0.61)
EOSINOPHIL NFR BLD AUTO: 5 % (ref 0–6)
ERYTHROCYTE [DISTWIDTH] IN BLOOD BY AUTOMATED COUNT: 13 % (ref 11.6–15.1)
GFR SERPL CREATININE-BSD FRML MDRD: 84 ML/MIN/1.73SQ M
GLUCOSE SERPL-MCNC: 101 MG/DL (ref 65–140)
HCT VFR BLD AUTO: 40.2 % (ref 36.5–49.3)
HGB BLD-MCNC: 12.8 G/DL (ref 12–17)
IMM GRANULOCYTES # BLD AUTO: 0.06 THOUSAND/UL (ref 0–0.2)
IMM GRANULOCYTES NFR BLD AUTO: 1 % (ref 0–2)
LYMPHOCYTES # BLD AUTO: 2.18 THOUSANDS/ΜL (ref 0.6–4.47)
LYMPHOCYTES NFR BLD AUTO: 20 % (ref 14–44)
MCH RBC QN AUTO: 32.1 PG (ref 26.8–34.3)
MCHC RBC AUTO-ENTMCNC: 31.8 G/DL (ref 31.4–37.4)
MCV RBC AUTO: 101 FL (ref 82–98)
MONOCYTES # BLD AUTO: 0.67 THOUSAND/ΜL (ref 0.17–1.22)
MONOCYTES NFR BLD AUTO: 6 % (ref 4–12)
NEUTROPHILS # BLD AUTO: 7.32 THOUSANDS/ΜL (ref 1.85–7.62)
NEUTS SEG NFR BLD AUTO: 67 % (ref 43–75)
NRBC BLD AUTO-RTO: 0 /100 WBCS
NT-PROBNP SERPL-MCNC: 425 PG/ML
PLATELET # BLD AUTO: 211 THOUSANDS/UL (ref 149–390)
PMV BLD AUTO: 10 FL (ref 8.9–12.7)
POTASSIUM SERPL-SCNC: 4.1 MMOL/L (ref 3.5–5.3)
PROT SERPL-MCNC: 7.5 G/DL (ref 6.4–8.2)
RBC # BLD AUTO: 3.99 MILLION/UL (ref 3.88–5.62)
SODIUM SERPL-SCNC: 137 MMOL/L (ref 136–145)
TROPONIN I SERPL-MCNC: <0.02 NG/ML
WBC # BLD AUTO: 10.83 THOUSAND/UL (ref 4.31–10.16)

## 2021-06-07 PROCEDURE — 99214 OFFICE O/P EST MOD 30 MIN: CPT | Performed by: FAMILY MEDICINE

## 2021-06-07 PROCEDURE — 99223 1ST HOSP IP/OBS HIGH 75: CPT | Performed by: PHYSICIAN ASSISTANT

## 2021-06-07 PROCEDURE — 84484 ASSAY OF TROPONIN QUANT: CPT | Performed by: EMERGENCY MEDICINE

## 2021-06-07 PROCEDURE — 99285 EMERGENCY DEPT VISIT HI MDM: CPT

## 2021-06-07 PROCEDURE — 99205 OFFICE O/P NEW HI 60 MIN: CPT | Performed by: INTERNAL MEDICINE

## 2021-06-07 PROCEDURE — 36415 COLL VENOUS BLD VENIPUNCTURE: CPT | Performed by: EMERGENCY MEDICINE

## 2021-06-07 PROCEDURE — 85025 COMPLETE CBC W/AUTO DIFF WBC: CPT | Performed by: EMERGENCY MEDICINE

## 2021-06-07 PROCEDURE — 93000 ELECTROCARDIOGRAM COMPLETE: CPT | Performed by: INTERNAL MEDICINE

## 2021-06-07 PROCEDURE — 93005 ELECTROCARDIOGRAM TRACING: CPT

## 2021-06-07 PROCEDURE — G0402 INITIAL PREVENTIVE EXAM: HCPCS | Performed by: FAMILY MEDICINE

## 2021-06-07 PROCEDURE — 80053 COMPREHEN METABOLIC PANEL: CPT | Performed by: EMERGENCY MEDICINE

## 2021-06-07 PROCEDURE — 83880 ASSAY OF NATRIURETIC PEPTIDE: CPT | Performed by: EMERGENCY MEDICINE

## 2021-06-07 PROCEDURE — 96374 THER/PROPH/DIAG INJ IV PUSH: CPT

## 2021-06-07 PROCEDURE — 99285 EMERGENCY DEPT VISIT HI MDM: CPT | Performed by: EMERGENCY MEDICINE

## 2021-06-07 PROCEDURE — 71045 X-RAY EXAM CHEST 1 VIEW: CPT

## 2021-06-07 RX ORDER — FUROSEMIDE 10 MG/ML
40 INJECTION INTRAMUSCULAR; INTRAVENOUS ONCE
Status: COMPLETED | OUTPATIENT
Start: 2021-06-07 | End: 2021-06-07

## 2021-06-07 RX ADMIN — FUROSEMIDE 40 MG: 10 INJECTION, SOLUTION INTRAMUSCULAR; INTRAVENOUS at 20:32

## 2021-06-07 NOTE — PROGRESS NOTES
Assessment/Plan:         Problem List Items Addressed This Visit        Respiratory    CASSIE (obstructive sleep apnea)     Not using  cpap              Cardiovascular and Mediastinum    Essential hypertension     Good today         Paroxysmal atrial fibrillation (HCC)      Currently  In sinus rhythym  pt had ablation 15 yrs ago has untreated CASSIE   Pt needs cardiologist eval for anticoagulation          Acute systolic congestive heart failure ([facility] ) - Primary     Wt Readings from Last 3 Encounters:   06/07/21 (!) 204 kg (450 lb)   04/13/21 (!) 207 kg (457 lb)   01/13/21 (!) 193 kg (425 lb)          clinically  Improved now in sinus rhythm likely was exacerbated by afib;pt now on lasix daily  would stop meloxicam for now to differentiate cause of leg edema cardiology appt scheduled for today            Other    Morbid obesity ([facility] )     Likely contributing to leg edema  sob         Bilateral lower extremity edema     CHF compounded by meloixicam  Stop meloxicam                 Subjective:  Pt here for ER follow up pt of dr Dustin Atkins was in ER with pulmonary edema leg edema and fluid overload pt with morbid obesity pt with CASSIE not using CPAP has  HTN  ER reports pt was in  afib  had ablation 15 yrs ago has chronic leg edema  Uses compression stockings     Patient ID: Rupali Stoner is a 61 y o  male  HPI    The following portions of the patient's history were reviewed and updated as appropriate:   Past Medical History:  He has a past medical history of Arthritis, Essential hypertension, Hyperlipidemia, Hypertension, Lower extremity edema, Obesity, CASSIE (obstructive sleep apnea), Osteoarthritis of both knees, PSVT (paroxysmal supraventricular tachycardia) ([facility] ), and Vitamin D deficiency  ,  _______________________________________________________________________  Medical Problems:  does not have any pertinent problems on file ,  _______________________________________________________________________  Past Surgical History: has a past surgical history that includes Knee surgery and Atrial ablation surgery  ,  _______________________________________________________________________  Family History:  family history includes Alzheimer's disease in his mother; Arthritis in his father; Asthma in his sister; Diabetes in his mother; Heart disease in his father; No Known Problems in his brother, sister, and sister  ,  _______________________________________________________________________  Social History:   reports that he quit smoking about 4 months ago  His smoking use included cigarettes  He started smoking about 15 years ago  He has a 15 00 pack-year smoking history  He has never used smokeless tobacco  He reports previous alcohol use  He reports that he does not use drugs  ,  _______________________________________________________________________  Allergies:  has No Known Allergies     _______________________________________________________________________  Current Outpatient Medications   Medication Sig Dispense Refill    aspirin (ECOTRIN LOW STRENGTH) 81 mg EC tablet Take 81 mg by mouth daily      atenolol (TENORMIN) 50 mg tablet TAKE 1 TABLET BY MOUTH EVERY DAY 90 tablet 0    atorvastatin (LIPITOR) 20 mg tablet TAKE 1 TABLET BY MOUTH EVERYDAY AT BEDTIME 90 tablet 0    cholecalciferol (VITAMIN D3) 25 mcg (1,000 units) tablet Take by mouth daily      Cyanocobalamin (VITAMIN B-12 PO) Take by mouth      folic acid (FOLVITE) 1 mg tablet       furosemide (LASIX) 20 mg tablet Take 1 tablet (20 mg total) by mouth daily 30 tablet 0    lisinopril (ZESTRIL) 10 mg tablet TAKE 1 TABLET BY MOUTH EVERY DAY 90 tablet 0    meloxicam (MOBIC) 15 mg tablet TAKE 1 TABLET BY MOUTH EVERY DAY 30 tablet 1     No current facility-administered medications for this visit       _______________________________________________________________________  Review of Systems   Constitutional: Negative for appetite change, chills, fatigue and fever     Respiratory: Positive for shortness of breath (a lttle better but usual for pt)  Negative for cough and chest tightness  Cardiovascular: Positive for leg swelling  Negative for chest pain and palpitations  Gastrointestinal: Negative for abdominal pain, constipation, diarrhea, nausea and vomiting  Genitourinary: Negative for difficulty urinating and frequency  Musculoskeletal: Positive for arthralgias  Negative for back pain and neck pain  Skin: Negative for rash  Neurological: Negative for dizziness, weakness, light-headedness, numbness and headaches  Hematological: Does not bruise/bleed easily  Psychiatric/Behavioral: Negative for dysphoric mood and sleep disturbance  The patient is not nervous/anxious  Objective:  Vitals:    06/07/21 0952   BP: 120/80   BP Location: Left arm   Patient Position: Sitting   Cuff Size: Large   Pulse: 72   Resp: 16   Temp: 98 2 °F (36 8 °C)   TempSrc: Temporal   SpO2: 92%   Weight: (!) 204 kg (450 lb)   Height: 5' 6 5" (1 689 m)     Body mass index is 71 54 kg/m²  Physical Exam  Vitals signs reviewed  Constitutional:       General: He is not in acute distress  Appearance: Normal appearance  He is well-developed  He is obese  He is not ill-appearing  HENT:      Mouth/Throat:      Mouth: Mucous membranes are moist    Eyes:      Extraocular Movements: Extraocular movements intact  Conjunctiva/sclera: Conjunctivae normal       Pupils: Pupils are equal, round, and reactive to light  Neck:      Musculoskeletal: Normal range of motion and neck supple  Thyroid: No thyromegaly  Vascular: No carotid bruit  Cardiovascular:      Rate and Rhythm: Normal rate and regular rhythm  Pulses: Normal pulses  Heart sounds: Normal heart sounds  No murmur  Pulmonary:      Effort: Pulmonary effort is normal  No respiratory distress  Breath sounds: Normal breath sounds  No wheezing, rhonchi or rales  Chest:      Chest wall: No tenderness  Abdominal:      General: Bowel sounds are normal  There is no distension  Palpations: Abdomen is soft  Tenderness: There is no abdominal tenderness  Musculoskeletal:      Right lower leg: Edema present  Left lower leg: Edema present  Comments: Compression stockings in place   Lymphadenopathy:      Cervical: No cervical adenopathy  Skin:     General: Skin is warm and dry  Neurological:      General: No focal deficit present  Mental Status: He is alert and oriented to person, place, and time  Mental status is at baseline  Cranial Nerves: No cranial nerve deficit     Psychiatric:         Mood and Affect: Mood normal          Behavior: Behavior normal

## 2021-06-07 NOTE — PROGRESS NOTES
Assessment and Plan:     Problem List Items Addressed This Visit     None           Preventive health issues were discussed with patient, and age appropriate screening tests were ordered as noted in patient's After Visit Summary  Personalized health advice and appropriate referrals for health education or preventive services given if needed, as noted in patient's After Visit Summary       History of Present Illness:     Patient presents for Medicare Annual Wellness visit    Patient Care Team:  Aidan Romano MD as PCP - General (Family Medicine)     Problem List:     Patient Active Problem List   Diagnosis    Primary osteoarthritis of both knees    Essential hypertension    Mixed hyperlipidemia    Morbid obesity ([address] )    CASSIE (obstructive sleep apnea)    Vitamin D deficiency    Ambulatory dysfunction    Bilateral lower extremity edema    Venous stasis ulcers of both lower extremities ([address] )    Former smoker    Edema of left upper arm      Past Medical and Surgical History:     Past Medical History:   Diagnosis Date    Arthritis     Essential hypertension     Hyperlipidemia     Hypertension     Lower extremity edema     Obesity     CASSIE (obstructive sleep apnea)     Osteoarthritis of both knees     PSVT (paroxysmal supraventricular tachycardia) ([address] )     Vitamin D deficiency      Past Surgical History:   Procedure Laterality Date    ATRIAL ABLATION SURGERY      KNEE SURGERY        Family History:     Family History   Problem Relation Age of Onset    Diabetes Mother     Alzheimer's disease Mother     Heart disease Father     Arthritis Father     Asthma Sister     No Known Problems Brother     No Known Problems Sister     No Known Problems Sister       Social History:     E-Cigarette/Vaping    E-Cigarette Use Never User      E-Cigarette/Vaping Substances    Nicotine No     THC No     CBD No     Flavoring No     Other No     Unknown No      Social History     Socioeconomic History    Marital status: /Civil Union     Spouse name: None    Number of children: None    Years of education: None    Highest education level: None   Occupational History    None   Social Needs    Financial resource strain: None    Food insecurity     Worry: None     Inability: None    Transportation needs     Medical: None     Non-medical: None   Tobacco Use    Smoking status: Former Smoker     Packs/day: 1 00     Years: 15 00     Pack years: 15 00     Types: Cigarettes     Start date: 2006     Quit date: 2021     Years since quittin 3    Smokeless tobacco: Never Used   Substance and Sexual Activity    Alcohol use: Not Currently     Alcohol/week: 0 0 standard drinks     Frequency: Monthly or less     Drinks per session: 1 or 2     Binge frequency: Never     Comment: harry [employer] Drug use: Never    Sexual activity: None   Lifestyle    Physical activity     Days per week: None     Minutes per session: None    Stress: None   Relationships    Social connections     Talks on phone: None     Gets together: None     Attends Church service: None     Active member of club or organization: None     Attends meetings of clubs or organizations: None     Relationship status: None    Intimate partner violence     Fear of current or ex partner: None     Emotionally abused: None     Physically abused: None     Forced sexual activity: None   Other Topics Concern    None   Social History Narrative    None      Medications and Allergies:     Current Outpatient Medications   Medication Sig Dispense Refill    aspirin (ECOTRIN LOW STRENGTH) 81 mg EC tablet Take 81 mg by mouth daily      atenolol (TENORMIN) 50 mg tablet TAKE 1 TABLET BY MOUTH EVERY DAY 90 tablet 0    atorvastatin (LIPITOR) 20 mg tablet TAKE 1 TABLET BY MOUTH EVERYDAY AT BEDTIME 90 tablet 0    cholecalciferol (VITAMIN D3) 25 mcg (1,000 units) tablet Take by mouth daily      Cyanocobalamin (VITAMIN B-12 PO) Take by mouth      folic acid (FOLVITE) 1 mg tablet       furosemide (LASIX) 20 mg tablet Take 1 tablet (20 mg total) by mouth daily 30 tablet 0    lisinopril (ZESTRIL) 10 mg tablet TAKE 1 TABLET BY MOUTH EVERY DAY 90 tablet 0    meloxicam (MOBIC) 15 mg tablet TAKE 1 TABLET BY MOUTH EVERY DAY 30 tablet 1     No current facility-administered medications for this visit  No Known Allergies   Immunizations:     Immunization History   Administered Date(s) Administered    Influenza, recombinant, quadrivalent,injectable, preservative free 10/27/2020    SARS-CoV-2 / COVID-19 mRNA IM (Pfizer-BioNTech) 03/22/2021, 04/15/2021      Health Maintenance:         Topic Date Due    Hepatitis C Screening  Never done    HIV Screening  Never done    Colorectal Cancer Screening  10/27/2021 (Originally 10/7/2010)         Topic Date Due    DTaP,Tdap,and Td Vaccines (1 - Tdap) Never done      Medicare Health Risk Assessment:     Temp 98 2 °F (36 8 °C) (Temporal)   Ht 5' 6 5" (1 689 m)   Wt (!) 204 kg (450 lb)   BMI 71 54 kg/m²      Jeannette Barnes is here for his Welcome to Medicare visit  Health Risk Assessment:   Patient rates overall health as fair  Patient feels that their physical health rating is slightly worse  Patient is satisfied with their life  Eyesight was rated as same  Hearing was rated as same  Patient feels that their emotional and mental health rating is same  Patients states they are sometimes angry  Patient states they are often unusually tired/fatigued  Pain experienced in the last 7 days has been a lot  Patient's pain rating has been 8/10  Patient states that he has experienced no weight loss or gain in last 6 months  Depression Screening:   PHQ-2 Score: 0      Fall Risk Screening: In the past year, patient has experienced: no history of falling in past year      Home Safety:  Patient has trouble with stairs inside or outside of their home   Patient has no working smoke alarms and has no working carbon monoxide detector  Home safety hazards include: none  Nutrition:   Current diet is Regular and No Added Salt  Medications:   Patient is currently taking over-the-counter supplements  OTC medications include: see medication list  Patient is able to manage medications  Activities of Daily Living (ADLs)/Instrumental Activities of Daily Living (IADLs):   Walk and transfer into and out of bed and chair?: Yes  Dress and groom yourself?: Yes    Bathe or shower yourself?: Yes    Feed yourself? Yes  Do your laundry/housekeeping?: Yes  Manage your money, pay your bills and track your expenses?: Yes  Make your own meals?: Yes    Do your own shopping?: Yes    Previous Hospitalizations:   Any hospitalizations or ED visits within the last 12 months?: Yes    How many hospitalizations have you had in the last year?: 1-2    Advance Care Planning:   Living will: No    Durable POA for healthcare: No    Advanced directive: No    Five wishes given: Yes      Cognitive Screening:   Provider or family/friend/caregiver concerned regarding cognition?: No    PREVENTIVE SCREENINGS      Cardiovascular Screening:    General: Screening Not Indicated and History Lipid Disorder    Due for: Lipid Panel      Diabetes Screening:     General: Screening Current      Prostate Cancer Screening:      Due for: PSA      Abdominal Aortic Aneurysm (AAA) Screening:    Risk factors include: tobacco use        Lung Cancer Screening:     General: Screening Not Indicated    Screening, Brief Intervention, and Referral to Treatment (SBIRT)    Screening  Typical number of drinks in a day: 0  Typical number of drinks in a week: 0  Interpretation: Low risk drinking behavior      Single Item Drug Screening:  How often have you used an illegal drug (including marijuana) or a prescription medication for non-medical reasons in the past year? never    Single Item Drug Screen Score: 0  Interpretation: Negative screen for possible drug use disorder      Harmony Loyd MD

## 2021-06-07 NOTE — ASSESSMENT & PLAN NOTE
Currently  In sinus rhythym  pt had ablation 15 yrs ago has untreated CASSIE    Pt needs cardiologist eval for anticoagulation

## 2021-06-07 NOTE — ED PROVIDER NOTES
History  Chief Complaint   Patient presents with    Shortness of Breath     shortness of breath and chest tightness for a few weeks  sent here from cardiology office     Aleja Wayne is a 61 y o  male who presents today at the direction of his outpatient cardiologist   The patient was seen today and given the evidence of congestive heart failure with significant volume overload the patient was directed to come to the emergency room for admittance to the hospital   The patient reports that he has been experiencing shortness of breath for the past couple of months along with swelling of his legs bilaterally  Prior to Admission Medications   Prescriptions Last Dose Informant Patient Reported? Taking?    Cyanocobalamin (VITAMIN B-12 PO)  Self Yes No   Sig: Take by mouth   aspirin (ECOTRIN LOW STRENGTH) 81 mg EC tablet  Self Yes No   Sig: Take 81 mg by mouth daily   atenolol (TENORMIN) 50 mg tablet  Self No No   Sig: TAKE 1 TABLET BY MOUTH EVERY DAY   atorvastatin (LIPITOR) 20 mg tablet  Self No No   Sig: TAKE 1 TABLET BY MOUTH EVERYDAY AT BEDTIME   cholecalciferol (VITAMIN D3) 25 mcg (1,000 units) tablet  Self Yes No   Sig: Take by mouth daily   folic acid (FOLVITE) 1 mg tablet  Self Yes No   furosemide (LASIX) 20 mg tablet  Self No No   Sig: Take 1 tablet (20 mg total) by mouth daily   lisinopril (ZESTRIL) 10 mg tablet  Self No No   Sig: TAKE 1 TABLET BY MOUTH EVERY DAY   meloxicam (MOBIC) 15 mg tablet  Self No No   Sig: TAKE 1 TABLET BY MOUTH EVERY DAY   Patient not taking: Reported on 6/7/2021      Facility-Administered Medications: None       Past Medical History:   Diagnosis Date    Arthritis     Atrial fibrillation (HCC)     Essential hypertension     Hyperlipidemia     Hypertension     Lower extremity edema     Obesity     CASSIE (obstructive sleep apnea)     Osteoarthritis of both knees     PSVT [facility]ox[facility]     Vitamin D deficiency        Past Surgical History:   Procedure Laterality Date    ATRIAL ABLATION SURGERY      KNEE SURGERY         Family History   Problem Relation Age of Onset    Diabetes Mother     Alzheimer's disease Mother     Heart disease Father     Arthritis Father     Asthma Sister     No Known Problems Brother     No Known Problems Sister     No Known Problems Sister      I have reviewed and agree with the history as documented      E-Cigarette/Vaping    E-Cigarette Use Never User      E-Cigarette/Vaping Substances    Nicotine No     THC No     CBD No     Flavoring No     Other No     Unknown No      Social History     Tobacco Use    Smoking status: Former Smoker     Packs/day: 1 00     Years: 15 00     Pack years: 15      Types: Cigarettes     Start date: 2006     Quit date: 2021     Years since quittin 3    Smokeless tobacco: Never Used   Substance Use Topics    Alcohol use: Not Currently     Alcohol/week: 0 0 standard drinks     Frequency: Monthly or less     Drinks per session: 1 or 2     Binge frequency: Never     Comment: occ per jacklyn     Drug use: Never        Review of Systems    Physical Exam  ED Triage Vitals [21 1700]   Temp Pulse Respirations Blood Pressure SpO2   -- 64 20 148/78 94 %      Temp src Heart Rate Source Patient Position - Orthostatic VS BP Location FiO2 (%)   -- -- -- -- --      Pain Score       --             Orthostatic Vital Signs  Vitals:    21 1700   BP: 148/78   Pulse: 64       Physical Exam    ED Medications  Medications   furosemide (LASIX) injection 40 mg (has no administration in time range)       Diagnostic Studies  Results Reviewed     Procedure Component Value Units Date/Time    CBC and differential [[id number]]     Lab Status: No result Specimen: Blood     Comprehensive metabolic panel [[id number]]     Lab Status: No result Specimen: Blood     NT-BNP PRO [[id number]]     Lab Status: No result Specimen: Blood     Troponin I [[id number]]     Lab Status: No result Specimen: Blood                  XR chest 1 view portable    (Results Pending)         Procedures  Procedures      ED Course  ED Course as of Jun 07 2038   Mon Jun 07, 2021 1945 Patient seen examined by resident physician                                          MDM  Number of Diagnoses or Management Options  Diagnosis management comments: 28-year-old male presenting today from outpatient cardiologist for admission due to what is most likely a acute CHF exacerbation  The patient is exhibiting bilateral pitting edema, along with dyspnea on exertion, lung auscultation did not elicit any crackles however was difficult due to the patient's large body habitus  Will order basic labs, troponin, BNP, CXR  Patient's wells criteria is low do not suspect a PE  Will admit the patient to [facility]  Amount and/or Complexity of Data Reviewed  Clinical lab tests: ordered and reviewed  Tests in the radiology section of CPT®: ordered and reviewed  Tests in the medicine section of CPT®: ordered and reviewed  Decide to obtain previous medical records or to obtain history from someone other than the patient: yes  Review and summarize past medical records: yes        Disposition  Final diagnoses:   None     ED Disposition     None      Follow-up Information    None         Patient's Medications   Discharge Prescriptions    No medications on file     No discharge procedures on file  PDMP Review     None           ED Provider  Attending physically available and evaluated Tania Gutierrez  I managed the patient along with the ED Attending      Electronically Signed by         Brennan Anderson DO  06/07/21 2041

## 2021-06-07 NOTE — PROGRESS NOTES
Cardiology Consultation     Kenji Zavalaangela  [id number]  1960  [facility] [address] [address]      1  Other heart failure ([address] )  Transfer to other facility   2  Essential hypertension     3  Paroxysmal atrial fibrillation (HCC)  POCT ECG       Discussion/Summary:    Acute hypoxic respiratory failure, likely multifactorial   He has evidence of congestive heart failure/ volume overload  Recently in the emergency room a week ago and at that time was advised to be admitted to the hospital but he declined  His diuretics were increased slightly, but he has not had a significant clinical response to this  Although a 12 lead ECG was reported as slow atrial fibrillation, this appears to be sinus rhythm to me  He has a history of atrial flutter with prior flutter ablation  It would be very challenging to try to deal with his acute symptoms in the outpatient setting  He needs more diuresis than he is currently getting, but will need close monitoring of his blood work  He will need an echocardiogram to evaluate whether LV function remains preserved, or if he has developed any systolic heart failure  Although his ECG was sinus rhythm, I think telemetry monitoring would be reasonable to monitor for any arrhythmias  although I think the likelihood overall of pulmonary embolism is low, could consider doing a CTA as he does have very sedentary lifestyle, lower extremity edema, hypoxia  Discussed with him, and he is now agreeable to proceeding to the emergency room for admission  Would start him on IV Lasix 40 mg twice a day monitors BMP, adjust diuretics as necessary  Check echocardiogram     Will discuss with cardiology team to follow him during the admission, he can see me in the office again after discharge      As there has been no recurrence of atrial flutter seen, I do not think he warrants anticoagulation at this time, unless other significant abnormalities are seen  History of Present Illness: This 61-year-old man comes to the office today for an initial consultation, referred by Dr Malinda Schneider  he has previously seen Dr Katerina Griffin at [place]  He had atrial flutter, had an ablation in 4/2012  He has never been on anticoagulation before  Per records (note in media), echos in the past have shown preserved LV function, last referenced from 2018  He is morbidly obese, says he was down to 380 a few years ago, but has been gaining weight, poor diet  Came to the ER at [facility] on 5/30 for shortness of breath which has been progressive  Not very active overall, but usually able to walk around his house  He's been sleeping in a chair downstairs because he can't get up the stairs due to SOB  Now says he can only walk a few feet  He is currently in a wheelchair  Has been wearing compression stockings recently  As on Jan, ulcers on his leg which he has seen [address] at [facility] for  When he was in ER, blood work pretty unremarkable  BNP a little high, although morbidly obese and no comparison  Renal function normal   CXR was done  No other imaging  He was advised to be admitted to the hospital, he declined  He had been on every other day lasix at 20mg, and was advised to increase to daily  His 12 lead ECG was read as slow atrial fibrillation, but to me this appeared sinus with a poor baseline  He has not felt palpitations, etc       Since then, he's noticed no significant improvement with the slightly higher dose of diuretics  He has tightness in the chest  Since sleeping upright in chair, hard to know if and PND/orthopnea, but edema is worsened according to him        Patient Active Problem List   Diagnosis    Primary osteoarthritis of both knees    Essential hypertension    Mixed hyperlipidemia    Morbid obesity ([address] )    CASSIE (obstructive sleep apnea)    Vitamin D deficiency    Ambulatory dysfunction    Bilateral lower extremity edema    [facility]ous stasis ulcers of both lower [facility] ([facility] )    Former smoker    Edema of left upper arm    Paroxysmal atrial fibrillation ([facility] )    Other heart failure ([facility] )     Past Medical History:   Diagnosis Date    Arthritis     Atrial fibrillation ([facility] )     Essential hypertension     Hyperlipidemia     Hypertension     Lower extremity edema     Obesity     CASSIE (obstructive sleep apnea)     Osteoarthritis of both knees     PSVT (paroxysmal supraventricular tachycardia) ([facility] )     Vitamin D deficiency      Social History     Tobacco Use    Smoking status: Former Smoker     Packs/day: 1      Years: 15 00     Pack years: 15      Types: Cigarettes     Start date: 2006     Quit date: 2021     Years since quittin 3    Smokeless tobacco: Never Used   Substance Use Topics    Alcohol use: Not Currently     Alcohol/week: 0 0 standard drinks     Frequency: Monthly or less     Drinks per session: 1 or 2     Binge frequency: Never     Comment: [employer]ry [employer] Drug use: Never      Family History   Problem Relation Age of Onset    Diabetes Mother     Alzheimer's disease Mother     Heart disease Father     Arthritis Father     Asthma Sister     No Known Problems Brother     No Known Problems Sister     No Known Problems Sister      Past Surgical History:   Procedure Laterality Date    ATRIAL ABLATION SURGERY      KNEE SURGERY         Current Outpatient Medications:     aspirin (ECOTRIN LOW STRENGTH) 81 mg EC tablet, Take 81 mg by mouth daily, Disp: , Rfl:     atenolol (TENORMIN) 50 mg tablet, TAKE 1 TABLET BY MOUTH EVERY DAY, Disp: 90 tablet, Rfl: 0    atorvastatin (LIPITOR) 20 mg tablet, TAKE 1 TABLET BY MOUTH EVERYDAY AT BEDTIME, Disp: 90 tablet, Rfl: 0    cholecalciferol (VITAMIN D3) 25 mcg (1,000 units) tablet, Take by mouth daily, Disp: , Rfl:     Cyanocobalamin (VITAMIN B-12 PO), Take by mouth, Disp: , Rfl:     folic acid (FOLVITE) 1 mg tablet, , Disp: , Rfl:     furosemide (LASIX) 20 mg tablet, Take 1 tablet (20 mg total) by mouth daily, Disp: 30 tablet, Rfl: 0    lisinopril (ZESTRIL) 10 mg tablet, TAKE 1 TABLET BY MOUTH EVERY DAY, Disp: 90 tablet, Rfl: 0    meloxicam (MOBIC) 15 mg tablet, TAKE 1 TABLET BY MOUTH EVERY DAY (Patient not taking: Reported on 6/7/2021), Disp: 30 tablet, Rfl: 1  No Known Allergies    Vitals:    06/07/21 1609   BP: 118/60   BP Location: Left arm   Patient Position: Sitting   Cuff Size: Large   Pulse: 67   SpO2: 92%   Weight: (!) 204 kg (450 lb)   Height: 5' 6 5" (1 689 m)     Vitals:    06/07/21 1609   Weight: (!) 204 kg (450 lb)      Height: 5' 6 5" (168 9 cm)   Body mass index is 71 54 kg/m²  Physical Exam:  GENERAL: appears dyspneic even with sitting in wheelchair  HEENT:  PERRL, EOMI, no scleral icterus, no conjunctival pallor  NECK:  Unable to assess JVP  HEART:  Regular, no significant murmurs  LUNGS:  Decreased air entry  ABDOMEN:  Morbidly obese, nontender  EXTREMITIES:  Compression stockings  Pitting edema 2+ or greater can be appreciated through this  NEURO: unable to assess, in wheelchair  SKIN: no rashes on exposed skin  ROS:  Positive for shortness of breath, weight gain, edema, chest tightness  Except as noted in HPI, is otherwise reviewed in detail and a 12 point review of systems is negative  Labs:  Lab Results   Component Value Date    SODIUM 138 05/30/2021    K 4 4 05/30/2021     05/30/2021    CREATININE 1 02 05/30/2021    BUN 18 05/30/2021    CO2 32 05/30/2021    ALT 19 05/30/2021    AST 12 05/30/2021    WBC 9 97 05/30/2021    HGB 13 0 05/30/2021    HCT 40 8 05/30/2021     05/30/2021       EKG:  Sinus rhythm, 67 beats per minute   Normal EKG

## 2021-06-07 NOTE — PATIENT INSTRUCTIONS

## 2021-06-07 NOTE — ASSESSMENT & PLAN NOTE
Wt Readings from Last 3 Encounters:   06/07/21 (!) 204 kg (450 lb)   04/13/21 (!) 207 kg (457 lb)   01/13/21 (!) 193 kg (425 lb)          clinically  Improved now in sinus rhythm likely was exacerbated by afib;pt now on lasix daily  would stop meloxicam for now to differentiate cause of leg edema cardiology appt scheduled for today

## 2021-06-08 ENCOUNTER — APPOINTMENT (INPATIENT)
Dept: NON INVASIVE DIAGNOSTICS | Facility: HOSPITAL | Age: 61
DRG: 291 | End: 2021-06-08
Payer: MEDICARE

## 2021-06-08 PROBLEM — D72.829 LEUKOCYTOSIS: Status: ACTIVE | Noted: 2021-06-08

## 2021-06-08 PROBLEM — I50.9 ACUTE HEART FAILURE (HCC): Status: ACTIVE | Noted: 2021-06-07

## 2021-06-08 LAB
ANION GAP SERPL CALCULATED.3IONS-SCNC: 6 MMOL/L (ref 4–13)
ATRIAL RATE: 124 BPM
ATRIAL RATE: 64 BPM
ATRIAL RATE: 76 BPM
ATRIAL RATE: 80 BPM
ATRIAL RATE: 87 BPM
ATRIAL RATE: 95 BPM
BUN SERPL-MCNC: 19 MG/DL (ref 5–25)
CALCIUM SERPL-MCNC: 8.9 MG/DL (ref 8.3–10.1)
CHLORIDE SERPL-SCNC: 102 MMOL/L (ref 100–108)
CHOLEST SERPL-MCNC: 139 MG/DL (ref 50–200)
CO2 SERPL-SCNC: 29 MMOL/L (ref 21–32)
CREAT SERPL-MCNC: 0.94 MG/DL (ref 0.6–1.3)
ERYTHROCYTE [DISTWIDTH] IN BLOOD BY AUTOMATED COUNT: 13.1 % (ref 11.6–15.1)
GFR SERPL CREATININE-BSD FRML MDRD: 88 ML/MIN/1.73SQ M
GLUCOSE SERPL-MCNC: 113 MG/DL (ref 65–140)
HCT VFR BLD AUTO: 40.5 % (ref 36.5–49.3)
HCV AB SER QL: NORMAL
HDLC SERPL-MCNC: 37 MG/DL
HGB BLD-MCNC: 12.5 G/DL (ref 12–17)
LDLC SERPL CALC-MCNC: 60 MG/DL (ref 0–100)
MAGNESIUM SERPL-MCNC: 2.1 MG/DL (ref 1.6–2.6)
MCH RBC QN AUTO: 30.9 PG (ref 26.8–34.3)
MCHC RBC AUTO-ENTMCNC: 30.9 G/DL (ref 31.4–37.4)
MCV RBC AUTO: 100 FL (ref 82–98)
NONHDLC SERPL-MCNC: 102 MG/DL
P AXIS: -30 DEGREES
PLATELET # BLD AUTO: 229 THOUSANDS/UL (ref 149–390)
PMV BLD AUTO: 10.4 FL (ref 8.9–12.7)
POTASSIUM SERPL-SCNC: 4 MMOL/L (ref 3.5–5.3)
PR INTERVAL: 256 MS
QRS AXIS: 1 DEGREES
QRS AXIS: 23 DEGREES
QRS AXIS: 24 DEGREES
QRS AXIS: 6 DEGREES
QRS AXIS: 6 DEGREES
QRS AXIS: 7 DEGREES
QRSD INTERVAL: 114 MS
QRSD INTERVAL: 74 MS
QRSD INTERVAL: 76 MS
QRSD INTERVAL: 84 MS
QRSD INTERVAL: 84 MS
QRSD INTERVAL: 86 MS
QT INTERVAL: 344 MS
QT INTERVAL: 408 MS
QT INTERVAL: 414 MS
QT INTERVAL: 416 MS
QT INTERVAL: 418 MS
QT INTERVAL: 418 MS
QTC INTERVAL: 421 MS
QTC INTERVAL: 435 MS
QTC INTERVAL: 480 MS
QTC INTERVAL: 489 MS
QTC INTERVAL: 511 MS
QTC INTERVAL: 598 MS
RBC # BLD AUTO: 4.05 MILLION/UL (ref 3.88–5.62)
SODIUM SERPL-SCNC: 137 MMOL/L (ref 136–145)
T WAVE AXIS: -2 DEGREES
T WAVE AXIS: -39 DEGREES
T WAVE AXIS: -89 DEGREES
T WAVE AXIS: -9 DEGREES
T WAVE AXIS: 235 DEGREES
T WAVE AXIS: 7 DEGREES
TRIGL SERPL-MCNC: 210 MG/DL
TROPONIN I SERPL-MCNC: <0.02 NG/ML
TROPONIN I SERPL-MCNC: <0.02 NG/ML
VENTRICULAR RATE: 125 BPM
VENTRICULAR RATE: 61 BPM
VENTRICULAR RATE: 79 BPM
VENTRICULAR RATE: 83 BPM
VENTRICULAR RATE: 94 BPM
VENTRICULAR RATE: 96 BPM
WBC # BLD AUTO: 11.78 THOUSAND/UL (ref 4.31–10.16)

## 2021-06-08 PROCEDURE — 97162 PT EVAL MOD COMPLEX 30 MIN: CPT

## 2021-06-08 PROCEDURE — 93010 ELECTROCARDIOGRAM REPORT: CPT | Performed by: INTERNAL MEDICINE

## 2021-06-08 PROCEDURE — 80048 BASIC METABOLIC PNL TOTAL CA: CPT | Performed by: PHYSICIAN ASSISTANT

## 2021-06-08 PROCEDURE — 93306 TTE W/DOPPLER COMPLETE: CPT | Performed by: INTERNAL MEDICINE

## 2021-06-08 PROCEDURE — 94664 DEMO&/EVAL PT USE INHALER: CPT

## 2021-06-08 PROCEDURE — 99232 SBSQ HOSP IP/OBS MODERATE 35: CPT | Performed by: INTERNAL MEDICINE

## 2021-06-08 PROCEDURE — 83735 ASSAY OF MAGNESIUM: CPT | Performed by: PHYSICIAN ASSISTANT

## 2021-06-08 PROCEDURE — C8929 TTE W OR WO FOL WCON,DOPPLER: HCPCS

## 2021-06-08 PROCEDURE — 85027 COMPLETE CBC AUTOMATED: CPT | Performed by: PHYSICIAN ASSISTANT

## 2021-06-08 PROCEDURE — 84484 ASSAY OF TROPONIN QUANT: CPT | Performed by: PHYSICIAN ASSISTANT

## 2021-06-08 PROCEDURE — 80061 LIPID PANEL: CPT | Performed by: INTERNAL MEDICINE

## 2021-06-08 PROCEDURE — 86803 HEPATITIS C AB TEST: CPT | Performed by: PHYSICIAN ASSISTANT

## 2021-06-08 PROCEDURE — 99222 1ST HOSP IP/OBS MODERATE 55: CPT | Performed by: INTERNAL MEDICINE

## 2021-06-08 RX ORDER — POTASSIUM CHLORIDE 20 MEQ/1
20 TABLET, EXTENDED RELEASE ORAL DAILY
Status: DISCONTINUED | OUTPATIENT
Start: 2021-06-08 | End: 2021-06-09

## 2021-06-08 RX ORDER — SENNOSIDES 8.6 MG
1 TABLET ORAL DAILY
Status: DISCONTINUED | OUTPATIENT
Start: 2021-06-08 | End: 2021-06-15 | Stop reason: HOSPADM

## 2021-06-08 RX ORDER — ATENOLOL 50 MG/1
50 TABLET ORAL DAILY
Status: DISCONTINUED | OUTPATIENT
Start: 2021-06-08 | End: 2021-06-08

## 2021-06-08 RX ORDER — CARVEDILOL 3.12 MG/1
3.12 TABLET ORAL 2 TIMES DAILY WITH MEALS
Status: DISCONTINUED | OUTPATIENT
Start: 2021-06-08 | End: 2021-06-09

## 2021-06-08 RX ORDER — ONDANSETRON 2 MG/ML
4 INJECTION INTRAMUSCULAR; INTRAVENOUS EVERY 6 HOURS PRN
Status: DISCONTINUED | OUTPATIENT
Start: 2021-06-08 | End: 2021-06-15 | Stop reason: HOSPADM

## 2021-06-08 RX ORDER — ASPIRIN 81 MG/1
81 TABLET ORAL DAILY
Status: DISCONTINUED | OUTPATIENT
Start: 2021-06-08 | End: 2021-06-15 | Stop reason: HOSPADM

## 2021-06-08 RX ORDER — FUROSEMIDE 10 MG/ML
40 INJECTION INTRAMUSCULAR; INTRAVENOUS
Status: DISCONTINUED | OUTPATIENT
Start: 2021-06-08 | End: 2021-06-09

## 2021-06-08 RX ORDER — CALCIUM CARBONATE 200(500)MG
1000 TABLET,CHEWABLE ORAL DAILY PRN
Status: DISCONTINUED | OUTPATIENT
Start: 2021-06-08 | End: 2021-06-15 | Stop reason: HOSPADM

## 2021-06-08 RX ORDER — LISINOPRIL 10 MG/1
10 TABLET ORAL DAILY
Status: DISCONTINUED | OUTPATIENT
Start: 2021-06-08 | End: 2021-06-11

## 2021-06-08 RX ORDER — ATORVASTATIN CALCIUM 20 MG/1
20 TABLET, FILM COATED ORAL
Status: DISCONTINUED | OUTPATIENT
Start: 2021-06-08 | End: 2021-06-15 | Stop reason: HOSPADM

## 2021-06-08 RX ORDER — ACETAMINOPHEN 325 MG/1
650 TABLET ORAL EVERY 6 HOURS PRN
Status: DISCONTINUED | OUTPATIENT
Start: 2021-06-08 | End: 2021-06-15 | Stop reason: HOSPADM

## 2021-06-08 RX ADMIN — ENOXAPARIN SODIUM 40 MG: 40 INJECTION SUBCUTANEOUS at 08:20

## 2021-06-08 RX ADMIN — ASPIRIN 81 MG: 81 TABLET, COATED ORAL at 08:20

## 2021-06-08 RX ADMIN — PERFLUTREN 1 ML/MIN: 6.52 INJECTION, SUSPENSION INTRAVENOUS at 15:24

## 2021-06-08 RX ADMIN — FUROSEMIDE 40 MG: 10 INJECTION, SOLUTION INTRAMUSCULAR; INTRAVENOUS at 15:31

## 2021-06-08 RX ADMIN — ATORVASTATIN CALCIUM 20 MG: 20 TABLET, FILM COATED ORAL at 20:17

## 2021-06-08 RX ADMIN — POTASSIUM CHLORIDE 20 MEQ: 1500 TABLET, EXTENDED RELEASE ORAL at 11:17

## 2021-06-08 RX ADMIN — ATORVASTATIN CALCIUM 20 MG: 20 TABLET, FILM COATED ORAL at 01:45

## 2021-06-08 RX ADMIN — FUROSEMIDE 40 MG: 10 INJECTION, SOLUTION INTRAMUSCULAR; INTRAVENOUS at 08:20

## 2021-06-08 RX ADMIN — SENNOSIDES 8.6 MG: 8.6 TABLET ORAL at 08:20

## 2021-06-08 RX ADMIN — CARVEDILOL 3.12 MG: 3.12 TABLET, FILM COATED ORAL at 15:31

## 2021-06-08 NOTE — ASSESSMENT & PLAN NOTE
Patient had EKG that showed slow atrial fibrillation however per Cardiology appears to be sinus rhythm  History of atrial flutter with prior flutter ablation  Monitor on telemetry

## 2021-06-08 NOTE — PLAN OF CARE
Problem: PAIN - ADULT  Goal: Verbalizes/displays adequate comfort level or baseline comfort level  Description: Interventions:  - Encourage patient to monitor pain and request assistance  - Assess pain using appropriate pain scale  - Administer analgesics based on type and severity of pain and evaluate response  - Implement non-pharmacological measures as appropriate and evaluate response  - Consider cultural and social influences on pain and pain management  - Notify physician/advanced practitioner if interventions unsuccessful or patient reports new pain  Outcome: Progressing     Problem: INFECTION - ADULT  Goal: Absence or prevention of progression during hospitalization  Description: INTERVENTIONS:  - Assess and monitor for signs and symptoms of infection  - Monitor lab/diagnostic results  - Monitor all insertion sites, i e  indwelling lines, tubes, and drains  - Monitor endotracheal if appropriate and nasal secretions for changes in amount and color  - [place] appropriate cooling/warming therapies per order  - Administer medications as ordered  - Instruct and encourage patient and family to use good hand hygiene technique  - Identify and instruct in appropriate isolation precautions for identified infection/condition  Outcome: Progressing  Goal: Absence of fever/infection during neutropenic period  Description: INTERVENTIONS:  - Monitor WBC    Outcome: Progressing     Problem: SAFETY ADULT  Goal: Patient will remain free of falls  Description: INTERVENTIONS:  - Assess patient frequently for physical needs  -  Identify cognitive and physical deficits and behaviors that affect risk of falls    -  [place] fall precautions as indicated by assessment   - Educate patient/family on patient safety including physical limitations  - Instruct patient to call for assistance with activity based on assessment  - Modify environment to reduce risk of injury  - Consider OT/PT consult to assist with strengthening/mobility  Outcome: Progressing  Goal: Maintain or return to baseline ADL function  Description: INTERVENTIONS:  -  Assess patient's ability to carry out ADLs; assess patient's baseline for ADL function and identify physical deficits which impact ability to perform ADLs (bathing, care of mouth/teeth, toileting, grooming, dressing, etc )  - Assess/evaluate cause of self-care deficits   - Assess range of motion  - Assess patient's mobility; develop plan if impaired  - Assess patient's need for assistive devices and provide as appropriate  - Encourage maximum independence but intervene and supervise when necessary  - Involve family in performance of ADLs  - Assess for home care needs following discharge   - Consider OT consult to assist with ADL evaluation and planning for discharge  - Provide patient education as appropriate  Outcome: Progressing  Goal: Maintain or return mobility status to optimal level  Description: INTERVENTIONS:  - Assess patient's baseline mobility status (ambulation, transfers, stairs, etc )    - Identify cognitive and physical deficits and behaviors that affect mobility  - Identify mobility aids required to assist with transfers and/or ambulation (gait belt, sit-to-stand, lift, walker, cane, etc )  - [place] fall precautions as indicated by assessment  - Record patient progress and toleration of activity level on Mobility SBAR; progress patient to next Phase/Stage  - Instruct patient to call for assistance with activity based on assessment  - Consider rehabilitation consult to assist with strengthening/weightbearing, etc   Outcome: Progressing

## 2021-06-08 NOTE — H&P
[place]  H&P- Marline Bounds 1960, 61 y o  male MRN: [id number]  Unit/Bed#: ED 05 Encounter: [id number]  Primary Care Provider: Katerine Benedict MD   Date and time admitted to hospital: 6/7/2021  7:27 PM    * Other heart failure [facility])  Assessment & Plan  Patient presented to outpatient cardiologist today for routine follow-up with evidence volume overload and suspected heart failure  Referred to the ER by cardiologist   No echocardiogram on file to determine type of heart failure    Symptomatically patient reports worsening lower extremity edema and dyspnea exertion x1 month  Minimal improvement with Lasix 20 mg daily  Start on IV Lasix 40 twice daily  Check echocardiogram  Consult cardiology  2 g sodium diet, fluid restriction, daily weights, I/O    Paroxysmal atrial fibrillation ([address] )  Assessment & Plan  Patient had EKG that showed slow atrial fibrillation however per Cardiology appears to be sinus rhythm  History of atrial flutter with prior flutter ablation  Monitor on telemetry    CASSIE (obstructive sleep apnea)  Assessment & Plan  Noncompliant with PAP therapy, states he was unable to tolerate it    Morbid obesity (HCC)  Assessment & Plan  Recommend diet, weight loss, lifestyle modifications  BMI greater than 70  follows outpatient bariatric    Mixed hyperlipidemia  Assessment & Plan  Continue Lipitor  Check lipid panel    Essential hypertension  Assessment & Plan  Continue atenolol and lisinopril  Monitor with diuresis    VTE Prophylaxis: Enoxaparin (Lovenox)  / sequential compression device and foot pump applied   Code Status: full code   POLST: POLST form is not discussed and not completed at this time  Discussion with family: patient declined call to wife     Anticipated Length of Stay:  Patient will be admitted on an Inpatient basis with an anticipated length of stay of  > 2 midnights     Justification for Hospital Stay: CHF management     Total Time for Visit, including Counseling / Coordination of Care: 45 minutes  Greater than 50% of this total time spent on direct patient counseling and coordination of care  Chief Complaint:   SOB     History of Present Illness:    Ralf Garza is a 61 y o  male with past medical history significant for possible a AFib/a flutter, hypertension, hyperlipidemia, lower extremity edema and venous stasis ulcers, obesity, arthritis, sleep apnea, who presents with volume overload  Patient was seen in the ER week ago with complaints of shortness of breath and was recommended for admission however declined and was discharged home  He was seen by outpatient cardiologist today who recommended inpatient evaluation for heart failure  Patient takes Lasix 20 mg daily and states this was recently increased from every other day with minimal improvement in diuresis  States his legs are chronically swollen but notes worsening shortness of breath with exertion over the last 1 month  He does not weigh himself daily  He states he only drinks 1 L water a day  Does not add salt to his foods  Able to sleep flat at night  Review of Systems:    Review of Systems   Constitutional: Negative for chills and fever  HENT: Negative for congestion  Respiratory: Positive for shortness of breath  Cardiovascular: Positive for leg swelling  Negative for chest pain  Gastrointestinal: Negative for abdominal pain  Genitourinary: Negative for dysuria  Musculoskeletal: Negative for back pain  Skin: Negative for wound  Neurological: Negative for dizziness and light-headedness  Psychiatric/Behavioral: Negative for confusion         Past Medical and Surgical History:     Past Medical History:   Diagnosis Date    Arthritis     Atrial fibrillation ([address] )     Essential hypertension     Hyperlipidemia     Hypertension     Lower extremity edema     Obesity     CASSIE (obstructive sleep apnea)     Osteoarthritis of both knees     PSVT ([address]oxysmal supraventricular tachy[address]) [address] [address] [address]     Vitamin D deficiency        Past Surgical History:   Procedure Laterality Date    ATRIAL ABLATION SURGERY      KNEE SURGERY         Meds/Allergies:    Prior to Admission medications    Medication Sig Start Date End Date Taking? Authorizing Provider   aspirin (ECOTRIN LOW STRENGTH) 81 mg EC tablet Take 81 mg by mouth daily   Yes Historical Provider, MD   atenolol (TENORMIN) 50 mg tablet TAKE 1 TABLET BY MOUTH EVERY DAY 5/2/21  Yes Avis Buchanan MD   atorvastatin (LIPITOR) 20 mg tablet TAKE 1 TABLET BY MOUTH EVERYDAY AT BEDTIME 5/2/21  Yes Avis Buchanan MD   cholecalciferol (VITAMIN D3) 25 mcg (1,000 units) tablet Take by mouth daily   Yes Historical Provider, MD   Cyanocobalamin (VITAMIN B-12 PO) Take by mouth   Yes Historical Provider, MD   folic acid (FOLVITE) 1 mg tablet    Yes Historical Provider, MD   furosemide (LASIX) 20 mg tablet Take 1 tablet (20 mg total) by mouth daily 5/30/21 6/29/21 Yes Vickie Schreiber DO   lisinopril (ZESTRIL) 10 mg tablet TAKE 1 TABLET BY MOUTH EVERY DAY 5/2/21  Yes Avis Buchanan MD   meloxicam (MOBIC) 15 mg tablet TAKE 1 TABLET BY MOUTH EVERY DAY  Patient not taking: Reported on 6/7/2021 5/28/21 6/7/21  Miriam Garcia MD     I have reviewed home medications with patient personally      Allergies: No Known Allergies    Social History:     Marital Status: /Civil Union   Occupation:   Patient Pre-hospital Living Situation: with wife   Patient Pre-hospital Level of Mobility: limited   Patient Pre-hospital Diet Restrictions: none   Substance Use History:   Social History     Substance and Sexual Activity   Alcohol Use Not Currently    Alcohol/week: 0 0 standard drinks    Frequency: Monthly or less    Drinks per session: 1 or 2    Binge frequency: Never    Comment: occ per jacklyn      Social History     Tobacco Use   Smoking Status Former Smoker    Packs/day: 1 00    Years: 15 00    Pack years: 15 00    Types: Cigarettes    Start date: 2006   Jorge Reagan Quit date: 2021    Years since quittin 3   Smokeless Tobacco Never Used     Social History     Substance and Sexual Activity   Drug Use Never     Family History:    Family History   Problem Relation Age of Onset    Diabetes Mother     Alzheimer's disease Mother     Heart disease Father     Arthritis Father     Asthma Sister     No Known Problems Brother     No Known Problems Sister     No Known Problems Sister        Physical Exam:     Vitals:   Blood Pressure: 149/67 (21)  Pulse: 85 (21)  Temperature: 98 °F (36 7 °C) (21)  Temp Source: Oral (21)  Respirations: 20 (21)  SpO2: 94 % (21)    Physical Exam  Constitutional:       Appearance: Normal appearance  He is obese  HENT:      Head: Normocephalic and atraumatic  Mouth/Throat:      Mouth: Mucous membranes are moist    Eyes:      Extraocular Movements: Extraocular movements intact  Neck:      Musculoskeletal: Normal range of motion and neck supple  Cardiovascular:      Rate and Rhythm: Normal rate and regular rhythm  Pulmonary:      Effort: Pulmonary effort is normal       Breath sounds: Normal breath sounds  Comments: satting 95% on RA - breath sounds decreased at bases   Abdominal:      General: Bowel sounds are normal       Palpations: Abdomen is soft  Musculoskeletal: Normal range of motion  General: No swelling  Skin:     General: Skin is warm and dry  Neurological:      General: No focal deficit present  Mental Status: He is alert and oriented to person, place, and time  Psychiatric:         Mood and Affect: Mood normal          Behavior: Behavior normal        Additional Data:     Lab Results: I have personally reviewed pertinent reports        Results from last 7 days   Lab Units 21   WBC Thousand/uL 10 83*   HEMOGLOBIN g/dL 12 8   HEMATOCRIT % 40 2   PLATELETS Thousands/uL 211   NEUTROS PCT % 67   LYMPHS PCT % 20   MONOS PCT % 6   EOS PCT % 5     Results from last 7 days   Lab Units 06/07/21 2004   SODIUM mmol/L 137   POTASSIUM mmol/L 4 1   CHLORIDE mmol/L 101   CO2 mmol/L 32   BUN mg/dL 20   CREATININE mg/dL 0 97   ANION GAP mmol/L 4   CALCIUM mg/dL 9 0   ALBUMIN g/dL 3 2*   TOTAL BILIRUBIN mg/dL 0 57   ALK PHOS U/L 88   ALT U/L 17   AST U/L 13   GLUCOSE RANDOM mg/dL 101                       Imaging: I have personally reviewed pertinent reports  XR chest 1 view portable    (Results Pending)       EKG, Pathology, and Other Studies Reviewed on Admission:   · EKG: sinus andi     Allscripts / Epic Records Reviewed: Yes     ** Please Note: This note has been constructed using a voice recognition system   **

## 2021-06-08 NOTE — CONSULTS
Consultation - Cardiology   Checo Mijares 61 y o  male MRN: [id number]  Unit/Bed#: S -01 Encounter: [id number]    Assessment/Plan     Principal Problem:    Other heart failure ([address] )  Active Problems:    Essential hypertension    Mixed hyperlipidemia    Morbid obesity (HCC)    CASSIE (obstructive sleep apnea)    Venous stasis ulcers of both lower extremities (HCC)    Paroxysmal atrial fibrillation (HCC)    Leukocytosis        Assessment/Plan    1  Acute heart failure- type undetermined at this time  ProBNP 425 ( patient is morbidly obese , may be falsely low)  Troponin negative x3  TTE pending  IV diuretic- 40mg IV BID- diuresing well  PTA Lasix 20 mg every day  Unknown baseline weight  CHF teaching  2 g sodium diet, fluid restriction  Weight daily  Strict I&O     2  History of atrial flutter ablation  Not on [facility]  Telemetry- NSR  Currently on aspirin 81, atenolol 50 daily    3  HTN- -150's  Lisinopril 10 mg, atenolol 50 mg- will likely change to toprol/coreg    4  Morbid obesity/untreated CASSIE  BMI 70  Recommend weight loss, dietary changes and lifestyle modifications    5  HLD-atorvastatin 20 mg daily  Check am FLP    History of Present Illness   Physician Requesting Consult: Tc Mathew MD  Reason for Consult / Principal Problem: heart failure    HPI: Checo Mijares is a 61y o  year old male with HTN, morbid obesity, CASSIE,  atrial flutter ablation 2012 , chronic lower extremity edema, venous stasis ulcers who presents with progressive dyspnea , LE edema   He was seen in the office yesterday by Dr Angy Tanner to establish care  He had previously been seen by Dr Foster Shock     He was in the ED at [facility] 5/30 with shortness of breath, sleeping in a recliner  He is on Lasix 20 mg every other day which was increased to 20 a day  Patient he has been having progressive dyspnea and edema since January  He was placed on Lasix 20 mg he has not had too much improvement per his recollection    He has recently been in the [address] [place] [address] Road for venous stasis ulcers  He is sedentary  He does know his baseline weight  He said he actually with a couple lb more back January that he does now  He has been sleeping in a recliner because he struggles to get up stairs to the bedroom because of shortness of breath  He has chest pressure when he short of breath  He lives home with his wife who does  the cooking  No added salt but unclear of the sodium he is eating in foods  Smoker but quit January 2021  Father with ICD  Patient is sedentary  He is disabled from bilateral knee arthritis  Activity declined from last year he was able to grandchildren's ball games  Inpatient consult to Cardiology  Consult performed by: ARYAN Bond  Consult ordered by: Mark Tatum PA-C          Review of Systems   Constitutional: Positive for activity change and unexpected weight change  HENT: Negative  Eyes: Negative  Respiratory: Positive for shortness of breath  Cardiovascular: Positive for chest pain and leg swelling  Negative for palpitations  Gastrointestinal: Negative  Endocrine: Negative  Genitourinary: Negative  Musculoskeletal: Positive for arthralgias  Skin: Negative  Allergic/Immunologic: Negative  Neurological: Negative  Hematological: Negative  Psychiatric/Behavioral: Negative  All other systems reviewed and are negative        Historical Information   Past Medical History:   Diagnosis Date    Arthritis     Atrial fibrillation ([address] )     Essential hypertension     Hyperlipidemia     Hypertension     Lower extremity edema     Obesity     CASSIE (obstructive sleep apnea)     Osteoarthritis of both knees     [facility]     Vitamin D deficiency      Past Surgical History:   Procedure Laterality Date    ATRIAL ABLATION SURGERY      KNEE SURGERY       Social History     Substance and Sexual Activity   Alcohol Use Not Currently    Alcohol/week: 0 0 standard drinks    Frequency: Monthly or less    Drinks per session: 1 or 2    Binge frequency: Never    Comment: occ per jacklyn      Social History     Substance and Sexual Activity   Drug Use Never     Social History     Tobacco Use   Smoking Status Former Smoker    Packs/day: 1 00    Years: 15 00    Pack years: 15 00    Types: Cigarettes    Start date: 2006   Harjeet Monroy Quit date: 2021    Years since quittin 4   Smokeless Tobacco Never Used     Family History:   Family History   Problem Relation Age of Onset    Diabetes Mother     Alzheimer's disease Mother     Heart disease Father     Arthritis Father     Asthma Sister     No Known Problems Brother     No Known Problems Sister     No Known Problems Sister        Meds/Allergies   current meds:   Current Facility-Administered Medications   Medication Dose Route Frequency    acetaminophen (TYLENOL) tablet 650 mg  650 mg Oral Q6H PRN    aspirin (ECOTRIN LOW STRENGTH) EC tablet 81 mg  81 mg Oral Daily    atenolol (TENORMIN) tablet 50 mg  50 mg Oral Daily    atorvastatin (LIPITOR) tablet 20 mg  20 mg Oral HS    calcium carbonate (TUMS) chewable tablet 1,000 mg  1,000 mg Oral Daily PRN    enoxaparin (LOVENOX) subcutaneous injection 40 mg  40 mg Subcutaneous Daily    furosemide (LASIX) injection 40 mg  40 mg Intravenous BID (diuretic)    lisinopril (ZESTRIL) tablet 10 mg  10 mg Oral Daily    ondansetron (ZOFRAN) injection 4 mg  4 mg Intravenous Q6H PRN    senna (SENOKOT) tablet 8 6 mg  1 tablet Oral Daily    and PTA meds:    Medications Prior to Admission   Medication    aspirin (ECOTRIN LOW STRENGTH) 81 mg EC tablet    atenolol (TENORMIN) 50 mg tablet    atorvastatin (LIPITOR) 20 mg tablet    cholecalciferol (VITAMIN D3) 25 mcg (1,000 units) tablet    Cyanocobalamin (VITAMIN H-08 PO)    folic acid (FOLVITE) 1 mg tablet    furosemide (LASIX) 20 mg tablet    lisinopril (ZESTRIL) 10 mg tablet     No Known Allergies    Objective   Vitals: Blood pressure 106/58, pulse 71, temperature 97 8 °F (36 6 °C), temperature source Oral, resp  rate 18, height 5' 6 5" (1 689 m), weight (!) 201 kg (443 lb 12 8 oz), SpO2 91 %  Orthostatic Blood Pressures      Most Recent Value   Blood Pressure  106/58 filed at 06/08/2021 0820   Patient Position - Orthostatic VS  Sitting filed at 06/08/2021 6535            Intake/Output Summary (Last 24 hours) at 6/8/2021 0943  Last data filed at 6/8/2021 0535  Gross per 24 hour   Intake --   Output 1120 ml   Net -1120 ml       Invasive Devices     Peripheral Intravenous Line            Peripheral IV 06/07/21 Right Antecubital less than 1 day                Physical Exam: /58   Pulse 71   Temp 97 8 °F (36 6 °C) (Oral)   Resp 18   Ht 5' 6 5" (1 689 m)   Wt (!) 201 kg (443 lb 12 8 oz)   SpO2 91%   BMI 70 56 kg/m²      General Appearance:    Alert, cooperative, no distress, morbidly obese    Head:    Normocephalic, no scleral icterus   Eyes:    PERRL   Nose:   Nares normal, septum midline, mucosa normal, no drainage    Throat:   Lips, mucosa, and tongue normal   Neck:   Supple, symmetrical, trachea midline            Lungs:     Clear to auscultation bilaterally, respirations unlabored        Heart:    Regular rate and rhythm, S1 and S2 normal, no murmur, rub   or gallop   Abdomen:     Large semi firm, non tender   Extremities:   Extremities normal, atraumatic, no cyanosis , grossly edematous       Skin:   Skin warm   Neurologic:   Alert and oriented to person place and time   No focal deficits       Lab Results:   Recent Results (from the past 72 hour(s))   CBC and differential    Collection Time: 06/07/21  8:04 PM   Result Value Ref Range    WBC 10 83 (H) 4 31 - 10 16 Thousand/uL    RBC 3 99 3 88 - 5 62 Million/uL    Hemoglobin 12 8 12 0 - 17 0 g/dL    Hematocrit 40 2 36 5 - 49 3 %     (H) 82 - 98 fL    MCH 32 1 26 8 - 34 3 pg    MCHC 31 8 31 4 - 37 4 g/dL    RDW 13 0 11 6 - 15 1 %    MPV 10 0 8 9 - 12 7 fL    Platelets 453 227 - 057 Thousands/uL    nRBC 0 /100 WBCs    Neutrophils Relative 67 43 - 75 %    Immat GRANS % 1 0 - 2 %    Lymphocytes Relative 20 14 - 44 %    Monocytes Relative 6 4 - 12 %    Eosinophils Relative 5 0 - 6 %    Basophils Relative 1 0 - 1 %    Neutrophils Absolute 7 32 1 85 - 7 62 Thousands/µL    Immature Grans Absolute 0 06 0 00 - 0 20 Thousand/uL    Lymphocytes Absolute 2 18 0 60 - 4 47 Thousands/µL    Monocytes Absolute 0 67 0 17 - 1 22 Thousand/µL    Eosinophils Absolute 0 54 0 00 - 0 61 Thousand/µL    Basophils Absolute 0 06 0 00 - 0 10 Thousands/µL   Comprehensive metabolic panel    Collection Time: 06/07/21  8:04 PM   Result Value Ref Range    Sodium 137 136 - 145 mmol/L    Potassium 4 1 3 5 - 5 3 mmol/L    Chloride 101 100 - 108 mmol/L    CO2 32 21 - 32 mmol/L    ANION GAP 4 4 - 13 mmol/L    BUN 20 5 - 25 mg/dL    Creatinine 0 97 0 60 - 1 30 mg/dL    Glucose 101 65 - 140 mg/dL    Calcium 9 0 8 3 - 10 1 mg/dL    Corrected Calcium 9 6 8 3 - 10 1 mg/dL    AST 13 5 - 45 U/L    ALT 17 12 - 78 U/L    Alkaline Phosphatase 88 46 - 116 U/L    Total Protein 7 5 6 4 - 8 2 g/dL    Albumin 3 2 (L) 3 5 - 5 0 g/dL    Total Bilirubin 0 57 0 20 - 1 00 mg/dL    eGFR 84 ml/min/1 73sq m   NT-BNP PRO    Collection Time: 06/07/21  8:04 PM   Result Value Ref Range    NT-proBNP 425 (H) <125 pg/mL   Troponin I    Collection Time: 06/07/21  8:04 PM   Result Value Ref Range    Troponin I <0 02 <=0 04 ng/mL   Troponin I    Collection Time: 06/08/21  1:34 AM   Result Value Ref Range    Troponin I <0 02 <=0 04 ng/mL   Troponin I    Collection Time: 06/08/21  5:36 AM   Result Value Ref Range    Troponin I <0 02 <=0 04 ng/mL   Basic metabolic panel    Collection Time: 06/08/21  5:36 AM   Result Value Ref Range    Sodium 137 136 - 145 mmol/L    Potassium 4 0 3 5 - 5 3 mmol/L    Chloride 102 100 - 108 mmol/L    CO2 29 21 - 32 mmol/L    ANION GAP 6 4 - 13 mmol/L    BUN 19 5 - 25 mg/dL Creatinine 0 94 0 60 - 1 30 mg/dL    Glucose 113 65 - 140 mg/dL    Calcium 8 9 8 3 - 10 1 mg/dL    eGFR 88 ml/min/1 73sq m   CBC (With Platelets)    Collection Time: 06/08/21  5:36 AM   Result Value Ref Range    WBC 11 78 (H) 4 31 - 10 16 Thousand/uL    RBC 4 05 3 88 - 5 62 Million/uL    Hemoglobin 12 5 12 0 - 17 0 g/dL    Hematocrit 40 5 36 5 - 49 3 %     (H) 82 - 98 fL    MCH 30 9 26 8 - 34 3 pg    MCHC 30 9 (L) 31 4 - 37 4 g/dL    RDW 13 1 11 6 - 15 1 %    Platelets 984 661 - 612 Thousands/uL    MPV 10 4 8 9 - 12 7 fL   Magnesium    Collection Time: 06/08/21  5:36 AM   Result Value Ref Range    Magnesium 2 1 1 6 - 2 6 mg/dL     Imaging: I have personally reviewed pertinent reports  EKG: NSR  VTE Prophylaxis: Enoxaparin (Lovenox)    Code Status: Level 1 - Full Code  Advance Directive and Living Will:      Power of :    POLST:      Counseling / Coordination of Care  Total floor / unit time spent today  45 minutes  Greater than 50% of total time was spent with the patient and / or family counseling and / or coordination of care

## 2021-06-08 NOTE — PLAN OF CARE
Problem: PHYSICAL THERAPY ADULT  Goal: Performs mobility at highest level of function for planned discharge setting  See evaluation for individualized goals  Description: Treatment/Interventions: ADL retraining, Functional transfer training, LE strengthening/ROM, Elevations, Therapeutic exercise, Endurance training, Gait training          See flowsheet documentation for full assessment, interventions and recommendations  Note: Prognosis: Good  Problem List: Decreased endurance, Decreased mobility, Obesity  Assessment: Rukhsana Werner is a 61 y o  Male who [facility] on 06/07/2021 from the direction of his outpatient cardiologist w/ c/o SOB and swelling in bilateral lower extremities and a diagnosis of CHF  Orders for PT eval and treat received  Pt presents w/ comorbidities of HTN, LE edema and venous stasis ulcers, obesity, bilateral knee arthritis, sleep apnea, and CHF  At baseline, pt mobilizes independently short distances w/ a bariatric RW in the community and in his home he either ambulates without an AD or he utilizes a rolling office chair as the bariatric RW isn't compatible to his home set up  Upon evaluation, pt presents w/ the following deficits: weakness, edema of extremities, impaired balance, decreased endurance and pain limiting functional mobility  Pt requires modified independence for transfers, supervision for gait, and Enedina x1 for elevations  Pt's clinical presentation is evolving due to tolerance to only 30 feet of ambulation and bilateral knee pain impacting overall mobility status  Given the above findings, discharge recommendation is for Home w/ OPPT in order to optimize functional mobility, however, pt will likely decline  During this admission, pt would benefit from skilled acute inpatient PT in order to address the abovementioned deficits to maximize function and mobility before DC from acute care              PT Discharge Recommendation: Home with outpatient rehabilitation See flowsheet documentation for full assessment

## 2021-06-08 NOTE — PROGRESS NOTES
[facility]  Progress Note - Ilona Day 1960, 61 y o  male MRN: [id number]  Unit/Bed#: S -01 Encounter: [id number]  Primary Care Provider: Robert Yung MD   Date and time admitted to hospital: 6/7/2021  7:27 PM    * Acute heart failure [facility])  Assessment & Plan  Patient presented to outpatient cardiologist today for routine follow-up with evidence volume overload and suspected heart failure  Referred to the ER by cardiologist   No echocardiogram on file to determine type of heart failure    Symptomatically patient reports worsening lower extremity edema and dyspnea exertion x1 month  Minimal improvement with Lasix 20 mg daily  Continue on IV Lasix 40 twice daily  Check echocardiogram  Cardiology on board  2 g sodium diet, fluid restriction, daily weights, I/O    Leukocytosis  Assessment & Plan  · Likely reactive  · No other signs and symptoms of an active infection  · Monitor  · For possible workup and management if this worsens significantly  Paroxysmal atrial fibrillation (HCC)  Assessment & Plan  Patient had EKG that showed slow atrial fibrillation however per Cardiology appears to be sinus rhythm  History of atrial flutter with prior flutter ablation  Monitor on telemetry    CASSIE (obstructive sleep apnea)  Assessment & Plan  Noncompliant with PAP therapy, states he was unable to tolerate it    Morbid obesity (HCC)  Assessment & Plan  Recommend diet, weight loss, lifestyle modifications  BMI greater than 70  follows outpatient bariatric    Mixed hyperlipidemia  Assessment & Plan  Continue Lipitor  Check lipid panel    Essential hypertension  Assessment & Plan  Continue atenolol and lisinopril  Monitor with diuresis      VTE Pharmacologic Prophylaxis:   Pharmacologic: Enoxaparin (Lovenox)  Mechanical VTE Prophylaxis in Place: Yes    Patient Centered Rounds: I have performed bedside rounds with nursing staff today      Discussions with Specialists or Other Care Team Provider:  Case management  Education and Discussions with Family / Patient:  I discussed our findings, management and plans  Answered all questions and concerns  He is okay with the management and plans  I offered to call patient's family, but patient declined  Time Spent for Care: 30 minutes  More than 50% of total time spent on counseling and coordination of care as described above  Current Length of Stay: 1 day(s)    Current Patient Status: Inpatient   Certification Statement: The patient will continue to require additional inpatient hospital stay due to Above findings and plans  Discharge Plan:  None yet  Code Status: Level 1 - Full Code      Subjective:   Patient is doing fine and feels better today  However, patient still has occasional shortness of breath and still has significant lower extremity swelling  Otherwise no other complaints  No pains  Objective:     Vitals:   Temp (24hrs), Av 3 °F (36 8 °C), Min:97 8 °F (36 6 °C), Max:99 1 °F (37 3 °C)    Temp:  [97 8 °F (36 6 °C)-99 1 °F (37 3 °C)] 98 1 °F (36 7 °C)  HR:  [64-85] 71  Resp:  [18-20] 18  BP: (106-156)/(58-78) 136/66  SpO2:  [90 %-98 %] 95 %  Body mass index is 70 56 kg/m²  Input and Output Summary (last 24 hours): Intake/Output Summary (Last 24 hours) at 2021 1529  Last data filed at 2021 1501  Gross per 24 hour   Intake 360 ml   Output 1620 ml   Net -1260 ml       Physical Exam:     Physical Exam  Vitals signs and nursing note reviewed  Constitutional:       General: He is not in acute distress  Appearance: He is obese  He is not ill-appearing, toxic-appearing or diaphoretic  HENT:      Head: Normocephalic and atraumatic  Cardiovascular:      Rate and Rhythm: Normal rate and regular rhythm  Heart sounds: Normal heart sounds  No murmur  No friction rub  No gallop  Pulmonary:      Effort: Pulmonary effort is normal  No respiratory distress  Breath sounds: Normal breath sounds   No stridor  No wheezing, rhonchi or rales  Abdominal:      General: Bowel sounds are normal  There is no distension  Palpations: Abdomen is soft  Tenderness: There is no abdominal tenderness  Musculoskeletal:      Right lower leg: Edema present  Left lower leg: Edema present  Comments: Plus four bilateral lower extremity edema  Skin:     General: Skin is warm  Coloration: Skin is not pale  Findings: No erythema or rash  Neurological:      General: No focal deficit present  Psychiatric:         Mood and Affect: Mood normal          Behavior: Behavior normal          Thought Content: Thought content normal              Additional Data:     Labs:    Results from last 7 days   Lab Units 06/08/21  0536 06/07/21 2004   WBC Thousand/uL 11 78* 10 83*   HEMOGLOBIN g/dL 12 5 12 8   HEMATOCRIT % 40 5 40 2   PLATELETS Thousands/uL 229 211   NEUTROS PCT %  --  67   LYMPHS PCT %  --  20   MONOS PCT %  --  6   EOS PCT %  --  5     Results from last 7 days   Lab Units 06/08/21 0536 06/07/21 2004   POTASSIUM mmol/L 4 0 4 1   CHLORIDE mmol/L 102 101   CO2 mmol/L 29 32   BUN mg/dL 19 20   CREATININE mg/dL 0 94 0 97   CALCIUM mg/dL 8 9 9 0   ALK PHOS U/L  --  88   ALT U/L  --  17   AST U/L  --  13           * I Have Reviewed All Lab Data Listed Above  * Additional Pertinent Lab Tests Reviewed: Penny 66 Admission Reviewed    Imaging:    Imaging Reports Reviewed Today Include:  Diagnostic imaging studies that were done on this admission  Imaging Personally Reviewed by Myself Includes:  None      Recent Cultures (last 7 days):           Last 24 Hours Medication List:   Current Facility-Administered Medications   Medication Dose Route Frequency Provider Last Rate    acetaminophen  650 mg Oral Q6H PRN Zeeshan Pederson PA-C      aspirin  81 mg Oral Daily Zeeshan Pederson PA-C      atorvastatin  20 mg Oral HS Zeeshan Pederson PA-C      calcium carbonate  1,000 mg Oral Daily PRN Torafal Pederson PA-C      carvedilol  3 125 mg Oral BID With Meals ARYAN Quezada      enoxaparin  40 mg Subcutaneous Daily Zeeshan Pederson PA-C      furosemide  40 mg Intravenous BID (diuretic) Zeeshan Pederson PA-C      lisinopril  10 mg Oral Daily Zeeshan Pederson PA-C      ondansetron  4 mg Intravenous Q6H PRN Zeeshan Pederson PA-C      potassium chloride  20 mEq Oral Daily ARYAN Quezada      senna  1 tablet Oral Daily Zeeshan Pederson PA-C          Today, Patient Was Seen By: Anthony Yarbrough MD    ** Please Note: Dragon 360 Dictation voice to text software may have been used in the creation of this document   **

## 2021-06-08 NOTE — ASSESSMENT & PLAN NOTE
Patient presented to outpatient cardiologist today for routine follow-up with evidence volume overload and suspected heart failure  Referred to the ER by cardiologist   No echocardiogram on file to determine type of heart failure    Symptomatically patient reports worsening lower extremity edema and dyspnea exertion x1 month  Minimal improvement with Lasix 20 mg daily    Continue on IV Lasix 40 twice daily  Check echocardiogram  Cardiology on board  2 g sodium diet, fluid restriction, daily weights, I/O

## 2021-06-08 NOTE — ASSESSMENT & PLAN NOTE
· Likely reactive  · No other signs and symptoms of an active infection  · Monitor  · For possible workup and management if this worsens significantly

## 2021-06-08 NOTE — ASSESSMENT & PLAN NOTE
Patient presented to outpatient cardiologist today for routine follow-up with evidence volume overload and suspected heart failure  Referred to the ER by cardiologist   No echocardiogram on file to determine type of heart failure    Symptomatically patient reports worsening lower extremity edema and dyspnea exertion x1 month  Minimal improvement with Lasix 20 mg daily    Start on IV Lasix 40 twice daily  Check echocardiogram  Consult cardiology  2 g sodium diet, fluid restriction, daily weights, I/O

## 2021-06-08 NOTE — PHYSICAL THERAPY NOTE
PHYSICAL THERAPY NOTE          Patient Name: Rupali Stoner  VGDXG'A Date: 2021   AGE:   61 y o  Mrn:   [id number]  ADMIT DX:  Shortness of breath [R06 02]  [facility] [I50 9]    Past Medical History:   Diagnosis Date    Arthritis     Atrial fibrillation (HCC)     Essential hypertension     Hyperlipidemia     Hypertension     Lower extremity edema     Obesity     CASSIE (obstructive sleep apnea)     Osteoarthritis of both knees     PSVT (paroxysmal supraventricular tachycardia) ([facility])     Vitamin D deficiency      Length Of Stay: 1  PHYSICAL THERAPY EVALUATION :   Patient's identity confirmed via 2 patient identifiers (full name and ) at start of session       21 0913   PT Last Visit   PT Visit Date 21   Note Type   Note type Evaluation   Pain Assessment   Pain Assessment Tool 0-10   Pain Score No Pain   Home Living   Type of Home House   Home Layout Two level  (6 LUIS w/ HR, full flight of stairs in home w/o HR)   Bathroom Shower/Tub Tub/shower unit   Bathroom Toilet Standard   Bathroom Equipment Grab bars in shower; Shower chair   Home Equipment Walker   Additional Comments Pt states prior to hospital admission he was able to access his home independently  He states he crawls up the stairs to access the second level of his home as there are no HRs and he descends the stairs holding onto the wall  Pt reports he keeps a bariatric RW in his car and uses a rolling office chair to get around his home as the RW is too wide to negotate his home  Pt states he walks 20ft in his home without an AD and is able to walk from his house to his car without a RW, which is about 35 ft      Prior Function   Level of [place] Independent with ADLs and functional mobility   Lives With Spouse   ADL Assistance Independent   IADLs Independent   Falls in the last 6 months 0   Vocational On disability General   Additional Pertinent History Pt declined trial with quad cane due to reports of unsafe techniques utilized in his home envrionment, however, pt declined due to c/o bilateral knee arthritis  Cognition   Overall Cognitive Status WFL   Arousal/Participation Alert   Orientation Level Oriented X4   Following Commands Follows all commands and directions without difficulty   RLE Assessment   RLE Assessment WFL   LLE Assessment   LLE Assessment WFL   Bed Mobility   Additional Comments Unable to assess - pt received in bedside recliner chair  Per pt's report, he is able to perform bed mobility independently   Transfers   Sit to Stand 6  Modified independent   Stand to Sit 6  Modified independent   Ambulation/Elevation   Gait pattern Wide KAMREN; Forward Flexion; Excessively slow   Gait Assistance 5  Supervision   Assistive Device Bariatric Rolling walker   Distance 30 ft   Stair Management Assistance 4  Minimal assist   Stair Management Technique One rail R  (utilized bedrail as HR)   Number of Stairs 1  (bariatric curb step; pt denied further trials)   Balance   Static Sitting Fair +   Static Standing Fair   Ambulatory Fair   Activity Tolerance   Activity Tolerance Patient limited by fatigue   Assessment   Prognosis Good   Problem List Decreased endurance;Decreased mobility;Obesity   Assessment Meghan Bennett is a 61 y o  Male who [facility] on [facility]07[facility]2021 from the direction of his outpatient cardiologist w/ c/o SOB and swelling in bilateral lower extremities and a diagnosis of CHF  Orders for PT eval and treat received  Pt presents w/ comorbidities of HTN, LE edema and venous stasis ulcers, obesity, bilateral knee arthritis, sleep apnea, and CHF  At baseline, pt mobilizes independently short distances w/ a bariatric RW in the community and in his home he either ambulates without an AD or he utilizes a rolling office chair as the bariatric RW isn't compatible to his home set up   Upon evaluation, pt presents w/ the following deficits: weakness, edema of extremities, impaired balance, decreased endurance and pain limiting functional mobility  Pt requires modified independence for transfers, supervision for gait, and Enedina x1 for elevations  Pt's clinical presentation is evolving due to tolerance to only 30 feet of ambulation and bilateral knee pain impacting overall mobility status  Given the above findings, discharge recommendation is for Home w/ OPPT in order to optimize functional mobility, however, pt will likely decline  During this admission, pt would benefit from skilled acute inpatient PT in order to address the abovementioned deficits to maximize function and mobility before DC from acute care  Goals   Patient Goals "I want to go home"   [facility] Expiration Date 06/18/21   Short Term Goal #1 Perform all transfers independently to restore PLOF and reduce caregiver burden, Ambulate at least 50 ft w/ bariatric RW w/o SpO2 decreasing below 90%, Navigate 6 stairs independently with unilateral handrail to facilitate return to previous living environment, Tolerate standing 5 minutes independently to facilitate functional task performance, Improve gait speed to 0 7 m/s to reduce fall risk and increase independence, and Perform TUG in less than 30 seconds with least restrictive AD to demonstrate improvements in mobility and reduce risk of falls  Plan   Treatment/Interventions ADL retraining;Functional transfer training;LE strengthening/ROM; Elevations; Therapeutic exercise; Endurance training;Gait training   PT Frequency Other (Comment)  (3-5x/week)   Recommendation   PT Discharge Recommendation Home with outpatient rehabilitation   Additional Comments Pt would benefit from OPPT to optimize functional mobility, however, pt will likely decline     AM-PAC Basic Mobility Inpatient   Turning in Bed Without Bedrails 4   Lying on Back to Sitting on Edge of Flat Bed 4   Moving Bed to Chair 4   Standing Up From Chair 4   Walk in Room 3 Climb 3-5 Stairs 3   Basic Mobility Inpatient Raw Score 22   Basic Mobility Standardized Score 47 4         The patient's AM-PAC Basic Mobility Inpatient Short Form Raw Score is 22, Standardized Score is 47 4  A standardized score greater than 42 9 suggests the patient may benefit from discharge to home  Please also refer to the recommendation of the Physical Therapist for safe discharge planning      Pt would benefit from skilled inpatient PT during this admission in order to facilitate progress towards goals to maximize functional independence    Ayaz Record, PT

## 2021-06-08 NOTE — ASSESSMENT & PLAN NOTE
Recommend diet, weight loss, lifestyle modifications  BMI greater than 70  follows outpatient bariatric

## 2021-06-08 NOTE — UTILIZATION REVIEW
Initial Clinical Review    Admission: Date/Time/Statement:   Admission Orders (From admission, onward)     Ordered        06/07/21 2048  Inpatient Admission  Once                   Orders Placed This Encounter   Procedures    Inpatient Admission     Standing Status:   Standing     Number of Occurrences:   1     Order Specific Question:   Level of Care     Answer:   Med Surg [16]     Order Specific Question:   Estimated length of stay     Answer:   More than 2 Midnights     Order Specific Question:   Certification     Answer:   I certify that inpatient services are medically necessary for this patient for a duration of greater than two midnights  See H&P and MD Progress Notes for additional information about the patient's course of treatment  ED Arrival Information     Expected Arrival Acuity Means of Arrival Escorted By Service Admission Type    6/7/2021 6/7/2021 16:53 Urgent Wheelchair Spouse Hospitalist Urgent    Arrival Complaint    Other heart failure [facility])        Chief Complaint   Patient presents with    Shortness of Breath     shortness of breath and chest tightness for a few weeks  sent here from cardiology office       Initial Presentation:  61 y o  male presents to the ED from cardiology office for evaluation of worsening SOB with exertion over the past month  PMH of atrial flutter with prior ablation, HTN, HL, LE edema, venous stasis ulcers, obesity, arthritis and sleep apnea  Patient was seen in the ER a week ago with complaints of shortness of breath and was recommended for admission, but he declined  He was seen by outpatient cardiologist today who recommended evaluation for heart failure  Patient takes Lasix 20 mg daily and states this was recently increased from every other day with minimal improvement in diuresis  Physical exam:  Conversationally dyspneic  Crackles present in all lung fields  B/L LE +2 pitting edema       Plan: Inpatient Med Surg admission for evaluation and treatment of heart failure:  IV Lasix 40 mg BID, telemetry, ECHO, 2 gm sodium diet, fluid restriction, I/O, daily weight, consult Cardiology  6/8 Cardiology consult: Acute heart failure, type undetermined at this time  Volume overload on exam  Patient does not weigh himself on a regular basis, dry weight is unknown  Continue IV diuretic  for a net negative fluid balance  Obtain ECHO  He has a history of atrial flutter, currently normal sinus rhythm  He also has obstructive sleep apnea which is untreated  Will transition the patient's atenolol to carvedilol for blood pressure control  Continue statin, obtain fasting lipid panel  Physical exam: lungs clear, abdomen large, semi-firm, grossly edematous extremities           ED Triage Vitals   Temperature Pulse Respirations Blood Pressure SpO2   06/07/21 2205 06/07/21 1700 06/07/21 1700 06/07/21 1700 06/07/21 1700   98 °F (36 7 °C) 64 20 148/78 94 %      Temp Source Heart Rate Source Patient Position - Orthostatic VS BP Location FiO2 (%)   06/07/21 2205 06/07/21 2015 06/07/21 2015 06/07/21 2015 --   Oral Monitor Lying Left arm       Pain Score       06/08/21 0105       No Pain          Wt Readings from Last 1 Encounters:   06/08/21 (!) 201 kg (443 lb 12 8 oz)     Additional Vital Signs:     Date/Time  Temp  Pulse  Resp  BP  MAP (mmHg)  SpO2  O2 Device   06/08/21 15:13:12  98 1 °F (36 7 °C)  71  18  136/66  89  95 %  None (Room air)   06/08/21 0820  --  71  --  106/58  --  --  --   06/08/21 0800  --  --  --  --  --  --  None (Room air)   06/08/21 0712  97 8 °F (36 6 °C)  72  --  116/59  78  91 %  --   06/08/21 0138  --  74  18  --  --  92 %  --   06/08/21 01:10:16  99 1 °F (37 3 °C)  73  18  133/63  86  90 %  None (Room air)   06/07/21 2205  98 °F (36 7 °C)  85  20  149/67  --  94 %  None (Room air)   06/07/21 2030  --  68  20  156/71  102  94 %  None (Room air)   06/07/21 2015  --  64  20  144/69  99  98 %  None (Room air)             Pertinent Labs/Diagnostic Test Results:       6/8 ECHO:      LEFT VENTRICLE: Size was normal  Systolic function was normal  Ejection fraction was estimated to be 60 %  Although no diagnostic regional wall motion abnormality was identified, this possibility cannot be completely excluded on the basis  of this study  Wall thickness was mildly increased  DOPPLER: The study was not technically sufficient to allow evaluation of LV diastolic function      RIGHT VENTRICLE: Not well visualized      LEFT ATRIUM: The atrium was dilated  Not well visualized      RIGHT ATRIUM: Not well visualized      MITRAL VALVE: Valve structure was normal  There was normal leaflet separation  DOPPLER: The transmitral velocity was within the normal range  There was no evidence for stenosis  There was no significant regurgitation      AORTIC VALVE: The valve was trileaflet  Leaflets exhibited normal thickness and normal cuspal separation  DOPPLER: Transaortic velocity was within the normal range  There was no evidence for stenosis  There was no regurgitation      TRICUSPID VALVE: The valve structure was normal  There was normal leaflet separation  DOPPLER: The transtricuspid velocity was within the normal range  There was no evidence for stenosis  There was trace regurgitation  The tricuspid jet  envelope definition was inadequate for estimation of RV systolic pressure      PULMONIC VALVE: Leaflets exhibited normal thickness, no calcification, and normal cuspal separation  DOPPLER: The transpulmonic velocity was within the normal range  There was trace regurgitation      PERICARDIUM: There was no pericardial effusion  The pericardium was normal in appearance      AORTA: The root exhibited normal size        6/7 CXR: No acute cardiopulmonary disease  6/7 ED EKG:  Normal sinus rhythm at 61 beats per minute with occasional PACs  Normal axis, normal intervals, no ST T wave abnormalities suggestive of ischemia    QTC is normal   Sinus rhythm is new when compared to prior from 05/30/2021 when he was in atrial fibrillation  Results from last 7 days   Lab Units 06/08/21  0536 06/07/21 2004   WBC Thousand/uL 11 78* 10 83*   HEMOGLOBIN g/dL 12 5 12 8   HEMATOCRIT % 40 5 40 2   PLATELETS Thousands/uL 229 211   NEUTROS ABS Thousands/µL  --  7 32         Results from last 7 days   Lab Units 06/08/21  0536 06/07/21 2004   SODIUM mmol/L 137 137   POTASSIUM mmol/L 4 0 4 1   CHLORIDE mmol/L 102 101   CO2 mmol/L 29 32   ANION GAP mmol/L 6 4   BUN mg/dL 19 20   CREATININE mg/dL 0 94 0 97   EGFR ml/min/1 73sq m 88 84   CALCIUM mg/dL 8 9 9 0   MAGNESIUM mg/dL 2 1  --      Results from last 7 days   Lab Units 06/07/21 2004   AST U/L 13   ALT U/L 17   ALK PHOS U/L 88   TOTAL PROTEIN g/dL 7 5   ALBUMIN g/dL 3 2*   TOTAL BILIRUBIN mg/dL 0 57        Ref   Range 6/8/2021 05:36   Cholesterol Latest Ref Range: 50 - 200 mg/dL 139   Triglycerides Latest Ref Range: <=150 mg/dL 210 (H)   HDL Latest Ref Range: >=40 mg/dL 37 (L)   Non-HDL Cholesterol Latest Units: mg/dl 102   LDL Calculated Latest Ref Range: 0 - 100 mg/dL 60               Results from last 7 days   Lab Units 06/08/21  0536 06/07/21 2004   GLUCOSE RANDOM mg/dL 113 101         Results from last 7 days   Lab Units 06/08/21  0536 06/08/21  0134 06/07/21 2004   TROPONIN I ng/mL <0 02 <0 02 <0 02             Results from last 7 days   Lab Units 06/07/21 2004   NT-PRO BNP pg/mL 425*         Results from last 7 days   Lab Units 06/08/21  0536   HEP C AB  Non-reactive       ED Treatment:   Medication Administration from 06/07/2021 1641 to 06/08/2021 0049       Date/Time Order Dose Route Action     06/07/2021 2032 furosemide (LASIX) injection 40 mg 40 mg Intravenous Given        Past Medical History:   Diagnosis Date    Arthritis     Atrial fibrillation ([address] )     Essential hypertension     Hyperlipidemia     Hypertension     Lower extremity edema     Obesity     CASSIE (obstructive sleep apnea)     Osteoarthritis of both knees     PSVT (paroxysmal supraventricular tachycardia) ([facility] )     Vitamin D deficiency      Present on Admission:   Essential hypertension   Mixed hyperlipidemia   Morbid obesity (HCC)   CASSIE (obstructive sleep apnea)   Venous stasis ulcers of both lower extremities (HCC)   Paroxysmal atrial fibrillation (HCC)   Acute heart failure (HCC)      Admitting Diagnosis: Shortness of breath [R06 02]  CHF (congestive heart failure) ([facility] ) [I50 9]  Age/Sex: 61 y o  male       Admission Orders:    Telemetry, SCD, PT/OT, daily weight, I/O  Scheduled Medications:      aspirin, 81 mg, Oral, Daily  atorvastatin, 20 mg, Oral, HS  carvedilol, 3 125 mg, Oral, BID With Meals  enoxaparin, 40 mg, Subcutaneous, Daily  furosemide, 40 mg, Intravenous, BID (diuretic)  lisinopril, 10 mg, Oral, Daily  potassium chloride, 20 mEq, Oral, Daily  senna, 1 tablet, Oral, Daily      Continuous IV Infusions:     PRN Meds:      acetaminophen, 650 mg, Oral, Q6H PRN  calcium carbonate, 1,000 mg, Oral, Daily PRN  ondansetron, 4 mg, Intravenous, Q6H PRN        IP CONSULT TO NUTRITION SERVICES  IP CONSULT TO CARDIOLOGY  IP CONSULT TO NUTRITION SERVICES    Network Utilization Review Department  ATTENTION: Please call with any questions or concerns to [phone] and carefully listen to the prompts so that you are directed to the right person  All voicemails are confidential   Miladys Grand all requests for admission clinical reviews, approved or denied determinations and any other requests to dedicated fax number below belonging to the campus where the patient is receiving treatment   List of dedicated fax numbers for the Facilities:  [address] DENIALS (Administrative/Medical Necessity) [phone]   [address] (Maternity/NICU/Pediatrics) [phone]   [address] [place] [address] - [address] [address] [place] [address] [address] [phone] [address] [facility] [phone]

## 2021-06-09 PROBLEM — I50.31 ACUTE DIASTOLIC HEART FAILURE (HCC): Status: ACTIVE | Noted: 2021-06-07

## 2021-06-09 LAB
ANION GAP SERPL CALCULATED.3IONS-SCNC: 6 MMOL/L (ref 4–13)
BUN SERPL-MCNC: 19 MG/DL (ref 5–25)
CALCIUM SERPL-MCNC: 9 MG/DL (ref 8.3–10.1)
CHLORIDE SERPL-SCNC: 101 MMOL/L (ref 100–108)
CO2 SERPL-SCNC: 32 MMOL/L (ref 21–32)
CREAT SERPL-MCNC: 0.94 MG/DL (ref 0.6–1.3)
ERYTHROCYTE [DISTWIDTH] IN BLOOD BY AUTOMATED COUNT: 13 % (ref 11.6–15.1)
GFR SERPL CREATININE-BSD FRML MDRD: 88 ML/MIN/1.73SQ M
GLUCOSE SERPL-MCNC: 116 MG/DL (ref 65–140)
HCT VFR BLD AUTO: 40.5 % (ref 36.5–49.3)
HGB BLD-MCNC: 12.6 G/DL (ref 12–17)
MCH RBC QN AUTO: 31 PG (ref 26.8–34.3)
MCHC RBC AUTO-ENTMCNC: 31.1 G/DL (ref 31.4–37.4)
MCV RBC AUTO: 100 FL (ref 82–98)
PLATELET # BLD AUTO: 211 THOUSANDS/UL (ref 149–390)
PMV BLD AUTO: 10.4 FL (ref 8.9–12.7)
POTASSIUM SERPL-SCNC: 3.8 MMOL/L (ref 3.5–5.3)
RBC # BLD AUTO: 4.06 MILLION/UL (ref 3.88–5.62)
SODIUM SERPL-SCNC: 139 MMOL/L (ref 136–145)
WBC # BLD AUTO: 9.93 THOUSAND/UL (ref 4.31–10.16)

## 2021-06-09 PROCEDURE — 97110 THERAPEUTIC EXERCISES: CPT

## 2021-06-09 PROCEDURE — 80048 BASIC METABOLIC PNL TOTAL CA: CPT | Performed by: INTERNAL MEDICINE

## 2021-06-09 PROCEDURE — 99232 SBSQ HOSP IP/OBS MODERATE 35: CPT | Performed by: INTERNAL MEDICINE

## 2021-06-09 PROCEDURE — 85027 COMPLETE CBC AUTOMATED: CPT | Performed by: INTERNAL MEDICINE

## 2021-06-09 PROCEDURE — 99232 SBSQ HOSP IP/OBS MODERATE 35: CPT | Performed by: PHYSICIAN ASSISTANT

## 2021-06-09 PROCEDURE — 97116 GAIT TRAINING THERAPY: CPT

## 2021-06-09 RX ORDER — CARVEDILOL 6.25 MG/1
6.25 TABLET ORAL 2 TIMES DAILY WITH MEALS
Status: DISCONTINUED | OUTPATIENT
Start: 2021-06-09 | End: 2021-06-14

## 2021-06-09 RX ORDER — FUROSEMIDE 10 MG/ML
80 INJECTION INTRAMUSCULAR; INTRAVENOUS
Status: DISCONTINUED | OUTPATIENT
Start: 2021-06-09 | End: 2021-06-10

## 2021-06-09 RX ORDER — POTASSIUM CHLORIDE 20 MEQ/1
20 TABLET, EXTENDED RELEASE ORAL
Status: DISCONTINUED | OUTPATIENT
Start: 2021-06-09 | End: 2021-06-14

## 2021-06-09 RX ORDER — FUROSEMIDE 10 MG/ML
40 INJECTION INTRAMUSCULAR; INTRAVENOUS ONCE
Status: COMPLETED | OUTPATIENT
Start: 2021-06-09 | End: 2021-06-09

## 2021-06-09 RX ADMIN — ATORVASTATIN CALCIUM 20 MG: 20 TABLET, FILM COATED ORAL at 21:28

## 2021-06-09 RX ADMIN — POTASSIUM CHLORIDE 20 MEQ: 1500 TABLET, EXTENDED RELEASE ORAL at 12:20

## 2021-06-09 RX ADMIN — FUROSEMIDE 80 MG: 10 INJECTION, SOLUTION INTRAMUSCULAR; INTRAVENOUS at 17:16

## 2021-06-09 RX ADMIN — SENNOSIDES 8.6 MG: 8.6 TABLET ORAL at 08:05

## 2021-06-09 RX ADMIN — POTASSIUM CHLORIDE 20 MEQ: 1500 TABLET, EXTENDED RELEASE ORAL at 17:15

## 2021-06-09 RX ADMIN — FUROSEMIDE 40 MG: 10 INJECTION, SOLUTION INTRAMUSCULAR; INTRAVENOUS at 12:07

## 2021-06-09 RX ADMIN — CARVEDILOL 3.12 MG: 3.12 TABLET, FILM COATED ORAL at 08:06

## 2021-06-09 RX ADMIN — CARVEDILOL 6.25 MG: 6.25 TABLET, FILM COATED ORAL at 17:15

## 2021-06-09 RX ADMIN — LISINOPRIL 10 MG: 10 TABLET ORAL at 08:05

## 2021-06-09 RX ADMIN — ENOXAPARIN SODIUM 40 MG: 40 INJECTION SUBCUTANEOUS at 08:05

## 2021-06-09 RX ADMIN — ASPIRIN 81 MG: 81 TABLET, COATED ORAL at 08:06

## 2021-06-09 RX ADMIN — POTASSIUM CHLORIDE 20 MEQ: 1500 TABLET, EXTENDED RELEASE ORAL at 08:05

## 2021-06-09 RX ADMIN — FUROSEMIDE 40 MG: 10 INJECTION, SOLUTION INTRAMUSCULAR; INTRAVENOUS at 08:04

## 2021-06-09 NOTE — ASSESSMENT & PLAN NOTE
Patient had EKG that showed slow atrial fibrillation however per Cardiology appears to be sinus rhythm  History of atrial flutter with prior flutter ablation  On telemetry, appears normal sinus  Valve anticoagulation, continue aspirin  Atenolol changed to Coreg

## 2021-06-09 NOTE — PROGRESS NOTES
Progress Note - Cardiology   Meghan Bennett 61 y o  male MRN: [id number]  Unit/Bed#: S -01 Encounter: [id number]        Principal Problem:    Acute heart failure ([address] )  Active Problems:    Essential hypertension    Mixed hyperlipidemia    Morbid obesity (HCC)    CASSIE (obstructive sleep apnea)    Venous stasis ulcers of both lower extremities (HCC)    Paroxysmal atrial fibrillation (HCC)    Leukocytosis         Assessment/Plan     1  Acute diastolic  heart failure  ProBNP 425 ( patient is morbidly obese , may be falsely low)  Troponin negative x3  IV diuretic- 40mg IV BID- will increase to 80 b i d  For improved diuresis  Weight down lb and  I&O -830  PTA Lasix 20 mg every day  Unknown baseline weight  CHF teaching  2 g sodium diet, fluid restriction  Weight daily  Strict I&O  Dietary consulted     2  History of atrial flutter ablation  Not on [facility]  Telemetry- NSR  Currently on aspirin 81, atenolol 50 daily     3  HTN- -140's  Lisinopril 10 mg, coreg 3 125mg BID     4  Morbid obesity/untreated CASSIE  BMI 70  Recommend weight loss, dietary changes and lifestyle modifications, CASSIE treatment      5  HLD-atorvastatin 20 mg daily  LDL- 60      6  NSVT- 12 beat NSVT  Keep potassium Greater than 4 and magnesium greater than 2  Additional K today,  increase carvedilol to 6 25 b i d  Normal LVEF    Subjective/Objective   Chief Complaint   patient without complaints of shortness of breath  Has not been doing much activity  Legs feel less swollen today    Reports good diuresis but I&O does not reflect some much          Vitals: /69 (BP Location: Left arm)   Pulse 71   Temp 97 5 °F (36 4 °C) (Oral)   Resp 18   Ht 5' 6 5" (1 689 m)   Wt (!) 201 kg (442 lb 12 8 oz)   SpO2 91%   BMI 70 40 kg/m²     Vitals:    06/08/21 0535 06/09/21 0600   Weight: (!) 201 kg (443 lb 12 8 oz) (!) 201 kg (442 lb 12 8 oz)     Orthostatic Blood Pressures      Most Recent Value   Blood Pressure  149/69 filed at 06/09/2021 0700 Patient Position - Orthostatic VS  Sitting filed at 06/09/2021 0700            Intake/Output Summary (Last 24 hours) at 6/9/2021 1007  Last data filed at 6/8/2021 2001  Gross per 24 hour   Intake 840 ml   Output 1675 ml   Net -835 ml       Invasive Devices     Peripheral Intravenous Line            Peripheral IV 06/07/21 Right Antecubital 1 day                Current Facility-Administered Medications   Medication Dose Route Frequency    acetaminophen (TYLENOL) tablet 650 mg  650 mg Oral Q6H PRN    aspirin (ECOTRIN LOW STRENGTH) EC tablet 81 mg  81 mg Oral Daily    atorvastatin (LIPITOR) tablet 20 mg  20 mg Oral HS    calcium carbonate (TUMS) chewable tablet 1,000 mg  1,000 mg Oral Daily PRN    carvedilol (COREG) tablet 3 125 mg  3 125 mg Oral BID With Meals    enoxaparin (LOVENOX) subcutaneous injection 40 mg  40 mg Subcutaneous Daily    furosemide (LASIX) injection 40 mg  40 mg Intravenous BID (diuretic)    lisinopril (ZESTRIL) tablet 10 mg  10 mg Oral Daily    ondansetron (ZOFRAN) injection 4 mg  4 mg Intravenous Q6H PRN    potassium chloride (K-DUR,KLOR-CON) CR tablet 20 mEq  20 mEq Oral Daily    senna (SENOKOT) tablet 8 6 mg  1 tablet Oral Daily         Physical Exam: /69 (BP Location: Left arm)   Pulse 71   Temp 97 5 °F (36 4 °C) (Oral)   Resp 18   Ht 5' 6 5" (1 689 m)   Wt (!) 201 kg (442 lb 12 8 oz)   SpO2 91%   BMI 70 40 kg/m²     General Appearance:    Alert, cooperative, no distress, appears stated age   Head:    Normocephalic, no scleral icterus   Eyes:    PERRL   Nose:   Nares normal, septum midline, no drainage    Throat:   Lips, mucosa, and tongue normal   Neck:   Supple, symmetrical, trachea midline,              Lungs:     Clear to auscultation bilaterally, respirations unlabored        Heart:    Regular rate and rhythm, S1 and S2 normal, no murmur, rub   or gallop   Abdomen:     Soft, semi firm   Extremities:   Extremities normal, atraumatic, no cyanosis , grossly edema Skin:   Skin warm   Neurologic:   Alert and oriented to person place and time                 Lab Results:   Recent Results (from the past 72 hour(s))   ECG 12 lead    Collection Time: 06/07/21  5:11 PM   Result Value Ref Range    Ventricular Rate 96 BPM    Atrial Rate 87 BPM    ID Interval  ms    QRSD Interval 114 ms    QT Interval 344 ms    QTC Interval 435 ms    P Axis  degrees    QRS Axis 23 degrees    T Wave Axis -89 degrees   ECG 12 lead    Collection Time: 06/07/21  5:12 PM   Result Value Ref Range    Ventricular Rate 79 BPM    Atrial Rate 76 BPM    ID Interval  ms    QRSD Interval 74 ms    QT Interval 418 ms    QTC Interval 480 ms    P Axis  degrees    QRS Axis 1 degrees    T Wave Axis -39 degrees   ECG 12 lead    Collection Time: 06/07/21  5:14 PM   Result Value Ref Range    Ventricular Rate 94 BPM    Atrial Rate 95 BPM    ID Interval  ms    QRSD Interval 84 ms    QT Interval 408 ms    QTC Interval 511 ms    P Axis  degrees    QRS Axis 6 degrees    T Wave Axis -2 degrees   ECG 12 lead    Collection Time: 06/07/21  5:14 PM   Result Value Ref Range    Ventricular Rate 83 BPM    Atrial Rate 80 BPM    ID Interval  ms    QRSD Interval 86 ms    QT Interval 416 ms    QTC Interval 489 ms    P Axis  degrees    QRS Axis 6 degrees    T Wave Axis -9 degrees   ECG 12 lead    Collection Time: 06/07/21  5:15 PM   Result Value Ref Range    Ventricular Rate 125 BPM    Atrial Rate 124 BPM    ID Interval 256 ms    QRSD Interval 76 ms    QT Interval 414 ms    QTC Interval 598 ms    P Axis -30 degrees    QRS Axis 24 degrees    T Wave Axis 235 degrees   ECG 12 lead    Collection Time: 06/07/21  5:17 PM   Result Value Ref Range    Ventricular Rate 61 BPM    Atrial Rate 64 BPM    ID Interval  ms    QRSD Interval 84 ms    QT Interval 418 ms    QTC Interval 421 ms    P Axis  degrees    QRS Axis 7 degrees    T Wave Axis 7 degrees   CBC and differential    Collection Time: 06/07/21  8:04 PM   Result Value Ref Range    WBC 10 83 (H) 4 31 - 10 16 Thousand/uL    RBC 3 99 3 88 - 5 62 Million/uL    Hemoglobin 12 8 12 0 - 17 0 g/dL    Hematocrit 40 2 36 5 - 49 3 %     (H) 82 - 98 fL    MCH 32 1 26 8 - 34 3 pg    MCHC 31 8 31 4 - 37 4 g/dL    RDW 13 0 11 6 - 15 1 %    MPV 10 0 8 9 - 12 7 fL    Platelets 054 930 - 394 Thousands/uL    nRBC 0 /100 WBCs    Neutrophils Relative 67 43 - 75 %    Immat GRANS % 1 0 - 2 %    Lymphocytes Relative 20 14 - 44 %    Monocytes Relative 6 4 - 12 %    Eosinophils Relative 5 0 - 6 %    Basophils Relative 1 0 - 1 %    Neutrophils Absolute 7 32 1 85 - 7 62 Thousands/µL    Immature Grans Absolute 0 06 0 00 - 0 20 Thousand/uL    Lymphocytes Absolute 2 18 0 60 - 4 47 Thousands/µL    Monocytes Absolute 0 67 0 17 - 1 22 Thousand/µL    Eosinophils Absolute 0 54 0 00 - 0 61 Thousand/µL    Basophils Absolute 0 06 0 00 - 0 10 Thousands/µL   Comprehensive metabolic panel    Collection Time: 06/07/21  8:04 PM   Result Value Ref Range    Sodium 137 136 - 145 mmol/L    Potassium 4 1 3 5 - 5 3 mmol/L    Chloride 101 100 - 108 mmol/L    CO2 32 21 - 32 mmol/L    ANION GAP 4 4 - 13 mmol/L    BUN 20 5 - 25 mg/dL    Creatinine 0 97 0 60 - 1 30 mg/dL    Glucose 101 65 - 140 mg/dL    Calcium 9 0 8 3 - 10 1 mg/dL    Corrected Calcium 9 6 8 3 - 10 1 mg/dL    AST 13 5 - 45 U/L    ALT 17 12 - 78 U/L    Alkaline Phosphatase 88 46 - 116 U/L    Total Protein 7 5 6 4 - 8 2 g/dL    Albumin 3 2 (L) 3 5 - 5 0 g/dL    Total Bilirubin 0 57 0 20 - 1 00 mg/dL    eGFR 84 ml/min/1 73sq m   NT-BNP PRO    Collection Time: 06/07/21  8:04 PM   Result Value Ref Range    NT-proBNP 425 (H) <125 pg/mL   Troponin I    Collection Time: 06/07/21  8:04 PM   Result Value Ref Range    Troponin I <0 02 <=0 04 ng/mL   Troponin I    Collection Time: 06/08/21  1:34 AM   Result Value Ref Range    Troponin I <0 02 <=0 04 ng/mL   Troponin I    Collection Time: 06/08/21  5:36 AM   Result Value Ref Range    Troponin I <0 02 <=0 04 ng/mL   Basic metabolic panel    Collection Time: 06/08/21  5:36 AM   Result Value Ref Range    Sodium 137 136 - 145 mmol/L    Potassium 4 0 3 5 - 5 3 mmol/L    Chloride 102 100 - 108 mmol/L    CO2 29 21 - 32 mmol/L    ANION GAP 6 4 - 13 mmol/L    BUN 19 5 - 25 mg/dL    Creatinine 0 94 0 60 - 1 30 mg/dL    Glucose 113 65 - 140 mg/dL    Calcium 8 9 8 3 - 10 1 mg/dL    eGFR 88 ml/min/1 73sq m   CBC (With Platelets)    Collection Time: 06/08/21  5:36 AM   Result Value Ref Range    WBC 11 78 (H) 4 31 - 10 16 Thousand/uL    RBC 4 05 3 88 - 5 62 Million/uL    Hemoglobin 12 5 12 0 - 17 0 g/dL    Hematocrit 40 5 36 5 - 49 3 %     (H) 82 - 98 fL    MCH 30 9 26 8 - 34 3 pg    MCHC 30 9 (L) 31 4 - 37 4 g/dL    RDW 13 1 11 6 - 15 1 %    Platelets 256 738 - 961 Thousands/uL    MPV 10 4 8 9 - 12 7 fL   Magnesium    Collection Time: 06/08/21  5:36 AM   Result Value Ref Range    Magnesium 2 1 1 6 - 2 6 mg/dL   Hepatitis C antibody    Collection Time: 06/08/21  5:36 AM   Result Value Ref Range    Hepatitis C Ab Non-reactive Non-reactive   Lipid panel    Collection Time: 06/08/21  5:36 AM   Result Value Ref Range    Cholesterol 139 50 - 200 mg/dL    Triglycerides 210 (H) <=150 mg/dL    HDL, Direct 37 (L) >=40 mg/dL    LDL Calculated 60 0 - 100 mg/dL    Non-HDL-Chol (CHOL-HDL) 102 mg/dl   CBC    Collection Time: 06/09/21  4:47 AM   Result Value Ref Range    WBC 9 93 4 31 - 10 16 Thousand/uL    RBC 4 06 3 88 - 5 62 Million/uL    Hemoglobin 12 6 12 0 - 17 0 g/dL    Hematocrit 40 5 36 5 - 49 3 %     (H) 82 - 98 fL    MCH 31 0 26 8 - 34 3 pg    MCHC 31 1 (L) 31 4 - 37 4 g/dL    RDW 13 0 11 6 - 15 1 %    Platelets 993 121 - 610 Thousands/uL    MPV 10 4 8 9 - 12 7 fL   Basic metabolic panel    Collection Time: 06/09/21  4:47 AM   Result Value Ref Range    Sodium 139 136 - 145 mmol/L    Potassium 3 8 3 5 - 5 3 mmol/L    Chloride 101 100 - 108 mmol/L    CO2 32 21 - 32 mmol/L    ANION GAP 6 4 - 13 mmol/L    BUN 19 5 - 25 mg/dL    Creatinine 0 94 0 60 - 1 30 mg/dL Glucose 116 65 - 140 mg/dL    Calcium 9 0 8 3 - 10 1 mg/dL    eGFR 88 ml/min/1 73sq m     Imaging: I have personally reviewed pertinent reports  Tele- nsr NSVT  [facility], [address] [facility]  [phone]     Transthoracic Echocardiogram  2D, M-mode, Doppler, and Color Doppler     Study date:  2021     Patient: Jason Rivera  MR number: [id number]  Account number: [de-identified]  : 1960  Age: 61 years  Gender: Male  Status: Inpatient  Location: Bedside  Height: 66 in  Weight: 442 lb  BP: 106/ 58 mmHg     Indications: Heart failure      Diagnoses: I50 9 - Heart failure, unspecified     Sonographer:  DONELL Ji  Primary Physician:  Sushil Montanez MD  Referring Physician:  Daisha Martínez MD  Group:  [id number]  Interpreting Physician:  Carla Cortez DO     SUMMARY     PROCEDURE INFORMATION:  This was a technically difficult study despite administration of echo contrast      LEFT VENTRICLE:  Systolic function was normal  Ejection fraction was estimated to be 60 %  Although no diagnostic regional wall motion abnormality was identified, this possibility cannot be completely excluded on the basis of this study  Wall thickness was mildly increased      LEFT ATRIUM:  The atrium was dilated      HISTORY: PRIOR HISTORY: HTN, HLD, morbid obesity, CASSIE, PAF, former smoker      PROCEDURE: The procedure was performed at the bedside  This was a routine study  The transthoracic approach was used  The study included complete 2D imaging, M-mode, complete spectral Doppler, and color Doppler  The heart rate was 74 bpm,  at the start of the study  Images were obtained from the parasternal, apical, subcostal, and suprasternal notch acoustic windows  Intravenous contrast ( 1 0ml Definity in NSS) was administered to opacify the left ventricle  Echocardiographic views were limited due to poor acoustic window availability and decreased penetration   This was a technically difficult study despite administration of echo contrast      LEFT VENTRICLE: Size was normal  Systolic function was normal  Ejection fraction was estimated to be 60 %  Although no diagnostic regional wall motion abnormality was identified, this possibility cannot be completely excluded on the basis  of this study  Wall thickness was mildly increased  DOPPLER: The study was not technically sufficient to allow evaluation of LV diastolic function      RIGHT VENTRICLE: Not well visualized      LEFT ATRIUM: The atrium was dilated  Not well visualized      RIGHT ATRIUM: Not well visualized      MITRAL VALVE: Valve structure was normal  There was normal leaflet separation  DOPPLER: The transmitral velocity was within the normal range  There was no evidence for stenosis  There was no significant regurgitation      AORTIC VALVE: The valve was trileaflet  Leaflets exhibited normal thickness and normal cuspal separation  DOPPLER: Transaortic velocity was within the normal range  There was no evidence for stenosis  There was no regurgitation      TRICUSPID VALVE: The valve structure was normal  There was normal leaflet separation  DOPPLER: The transtricuspid velocity was within the normal range  There was no evidence for stenosis  There was trace regurgitation  The tricuspid jet  envelope definition was inadequate for estimation of RV systolic pressure      PULMONIC VALVE: Leaflets exhibited normal thickness, no calcification, and normal cuspal separation  DOPPLER: The transpulmonic velocity was within the normal range  There was trace regurgitation      PERICARDIUM: There was no pericardial effusion  The pericardium was normal in appearance      AORTA: The root exhibited normal size      SYSTEMIC VEINS: IVC: Not assessed      SYSTEM MEASUREMENT TABLES       Counseling / Coordination of Care  Total time spent today20 minutes   Greater than 50% of total time was spent with the patient and / or family counseling and / or coordination of care

## 2021-06-09 NOTE — ASSESSMENT & PLAN NOTE
Patient presented to outpatient cardiologist 6/7 for routine follow-up with evidence volume overload and suspected heart failure  Referred to the ER by cardiologist   Symptomatically patient reports worsening lower extremity edema and dyspnea exertion x1 month  Minimal improvement with Lasix 20 mg daily    Appreciate input from Cardiology  Increase IV Lasix 40 twice daily to 80 mg twice daily  LVEF on echocardiogram 60%  Cardiology on board  2 g sodium diet, fluid restriction, daily weights, I/O

## 2021-06-09 NOTE — PHYSICAL THERAPY NOTE
PHYSICAL THERAPY TREATMENT     Patient Name: Karime Adhikari  WJDMS'S Date: 21 0849   PT Last Visit   PT Visit Date 21   Note Type   Note Type Treatment   Pain Assessment   Pain Assessment Tool Pain Assessment not indicated - pt denies pain   Pain Score No Pain   Restrictions/Precautions   Weight Bearing Precautions Per Order No   Other Precautions Fall Risk   General   Chart Reviewed Yes   Response to Previous Treatment Patient with no complaints from previous session  Family/Caregiver Present No   Cognition   Overall Cognitive Status WFL   Arousal/Participation Cooperative   Attention Within functional limits   Comments Pt identified by name and   Subjective   Subjective Agrees to PT treatment and is pleasant and cooperative throughout session  Bed Mobility   Supine to Sit Unable to assess  (OOB in chair pre/post session with all needs in reach)   Transfers   Sit to Stand 6  Modified independent   Additional items Armrests   Stand to Sit 6  Modified independent   Additional items Armrests   Ambulation/Elevation   Gait pattern Wide KARMEN; Forward Flexion;Decreased foot clearance; Short stride; Excessively slow  (increased UE support)   Gait Assistance 5  Supervision   Additional items Verbal cues   Assistive Device Bariatric Rolling walker   Distance 30` x2   Balance   Static Sitting Good   Static Standing Fair +   Dynamic Standing Fair   Ambulatory Fair   Endurance Deficit   Endurance Deficit Yes   Endurance Deficit Description limited ambulation distance, fatigue, mild SOB  (requires seated rest breaks)   Activity Tolerance   Activity Tolerance Patient limited by fatigue;Patient tolerated treatment well   Exercises   Knee AROM Long Arc Quad Sitting;10 reps;AROM; Bilateral  (x2)   Ankle Pumps Sitting;10 reps;AROM; Bilateral  (x2)   Heel Cord Stretch Sitting;10 reps;AROM; Bilateral  (x2)   Marching Sitting;10 reps;AROM; Bilateral  (x2)   Assessment   Prognosis Good Problem List Decreased endurance;Decreased mobility;Obesity   Assessment Pt tolerated treatment well and is progressing with overall mobility tolerance  Able to ambulate overall increased ambulation distance, however requires seated rest breaks of 2-3` due to SOB  Pt reports willingness to use RW at home and reports he thinks he could make room for the RW by moving the [employer]  Reports increased overall ambulation stability with use of RW  Education provided once again on the safety concerns about using his office chair as an AD  Pt reports good understanding  Tolerates seated LE exercises well  Will continue to benefit from ongoing skilled PT to maximize his functional mobility and decrease his risk of falls  Recommending OPPT and family support at time of discharge when medically appropriate  Goals   Patient Goals to go home, to not have a fall   STG Expiration Date 06/18/21   PT Treatment Day 1   Plan   Treatment/Interventions Functional transfer training;LE strengthening/ROM; Elevations; Therapeutic exercise; Endurance training;Patient/family training;Equipment eval/education; Bed mobility;Gait training   Progress Slow progress, decreased activity tolerance   PT Frequency Other (Comment)  (3-5x/week)   Recommendation   PT Discharge Recommendation Home with outpatient rehabilitation   Equipment Recommended [facility] Recommended HD Bariatric wheeled walker   Holly 8 in Bed Without Bedrails 4   Lying on Back to Sitting on Edge of Flat Bed 4   Moving Bed to Chair 4   Standing Up From Chair 4   Walk in Room 3   Climb 3-5 Stairs 3   Basic Mobility Inpatient Raw Score 22   Basic Mobility Standardized Score 47 4   The patient's AM-PAC Basic Mobility Inpatient Short Form Raw Score is 22, Standardized Score is 47 4  A standardized score greater than 42 9 suggests the patient may benefit from discharge to home   Please also refer to the recommendation of the Physical Therapist for safe discharge planning      Haydee Grant, PT,DPT

## 2021-06-09 NOTE — PROGRESS NOTES
[facility]  Progress Note - Vannessa Garciar 1960, 61 y o  male MRN: [id number]  Unit/Bed#: S -01 Encounter: [id number]  Primary Care Provider: Randy Early MD   Date and time admitted to hospital: 6/7/2021  7:27 PM    * Acute diastolic heart failure [facility])  Assessment & Plan  Patient presented to outpatient cardiologist 6/7 for routine follow-up with evidence volume overload and suspected heart failure  Referred to the ER by cardiologist   Symptomatically patient reports worsening lower extremity edema and dyspnea exertion x1 month  Minimal improvement with Lasix 20 mg daily  Appreciate input from Cardiology  Increase IV Lasix 40 twice daily to 80 mg twice daily  LVEF on echocardiogram 60%  Cardiology on board  2 g sodium diet, fluid restriction, daily weights, I/O    Paroxysmal atrial fibrillation ([address] )  Assessment & Plan  Patient had EKG that showed slow atrial fibrillation however per Cardiology appears to be sinus rhythm  History of atrial flutter with prior flutter ablation  On telemetry, appears normal sinus  Valve anticoagulation, continue aspirin  Atenolol changed to Coreg    CASSIE (obstructive sleep apnea)  Assessment & Plan  Noncompliant with PAP therapy, states he was unable to tolerate it    Morbid obesity (HCC)  Assessment & Plan  Recommend diet, weight loss, lifestyle modifications  BMI greater than 70  follows outpatient bariatric    Mixed hyperlipidemia  Assessment & Plan  Continue Lipitor  LDL 60    Essential hypertension  Assessment & Plan  Continue lisinopril, atenolol changed to Coreg  Monitor with diuresis    VTE Pharmacologic Prophylaxis:   Pharmacologic: Enoxaparin (Lovenox)  Mechanical VTE Prophylaxis in Place: Yes    Patient Centered Rounds: I have performed bedside rounds with nursing staff today      Discussions with Specialists or Other Care Team Provider: marilyn     Education and Discussions with Family / Patient: patient     Time Spent for Care: 30 minutes  More than 50% of total time spent on counseling and coordination of care as described above  Current Length of Stay: 2 day(s)    Current Patient Status: Inpatient   Certification Statement: The patient will continue to require additional inpatient hospital stay due to pending improvement in diuresis     Discharge Plan: pending  Clinical course     Code Status: Level 1 - Full Code      Subjective:   Pt seen and examined at bedside  He reports normal urination, not much increased from baseline  No chest pain  Does endorse improvement in shortness of breath  States his lower extremity edema seems improved  Objective:     Vitals:   Temp (24hrs), Av 1 °F (36 7 °C), Min:97 5 °F (36 4 °C), Max:98 6 °F (37 °C)    Temp:  [97 5 °F (36 4 °C)-98 6 °F (37 °C)] 97 5 °F (36 4 °C)  HR:  [70-71] 71  Resp:  [16-18] 18  BP: (122-149)/(66-69) 149/69  SpO2:  [90 %-95 %] 91 %  Body mass index is 70 4 kg/m²  Input and Output Summary (last 24 hours): Intake/Output Summary (Last 24 hours) at 2021 1100  Last data filed at 2021 1001  Gross per 24 hour   Intake 1080 ml   Output 2425 ml   Net -1345 ml       Physical Exam:     Physical Exam  Constitutional:       Appearance: Normal appearance  He is obese  HENT:      Head: Normocephalic and atraumatic  Mouth/Throat:      Mouth: Mucous membranes are moist    Eyes:      Extraocular Movements: Extraocular movements intact  Neck:      Musculoskeletal: Normal range of motion and neck supple  Cardiovascular:      Rate and Rhythm: Normal rate and regular rhythm  Pulmonary:      Comments: Overall decreased at bases likely due to body habitus  Abdominal:      General: Bowel sounds are normal    Musculoskeletal: Normal range of motion  General: Swelling present  Right lower leg: Edema present  Left lower leg: Edema present  Skin:     General: Skin is warm and dry  Neurological:      General: No focal deficit present        Mental Status: He is alert  Psychiatric:         Mood and Affect: Mood normal          Behavior: Behavior normal          Additional Data:     Labs:    Results from last 7 days   Lab Units 06/09/21 0447 06/07/21 2004   WBC Thousand/uL 9 93   < > 10 83*   HEMOGLOBIN g/dL 12 6   < > 12 8   HEMATOCRIT % 40 5   < > 40 2   PLATELETS Thousands/uL 211   < > 211   NEUTROS PCT %  --   --  67   LYMPHS PCT %  --   --  20   MONOS PCT %  --   --  6   EOS PCT %  --   --  5    < > = values in this interval not displayed  Results from last 7 days   Lab Units 06/09/21  0447 06/07/21 2004   SODIUM mmol/L 139   < > 137   POTASSIUM mmol/L 3 8   < > 4 1   CHLORIDE mmol/L 101   < > 101   CO2 mmol/L 32   < > 32   BUN mg/dL 19   < > 20   CREATININE mg/dL 0 94   < > 0 97   ANION GAP mmol/L 6   < > 4   CALCIUM mg/dL 9 0   < > 9 0   ALBUMIN g/dL  --   --  3 2*   TOTAL BILIRUBIN mg/dL  --   --  0 57   ALK PHOS U/L  --   --  88   ALT U/L  --   --  17   AST U/L  --   --  13   GLUCOSE RANDOM mg/dL 116   < > 101    < > = values in this interval not displayed  * I Have Reviewed All Lab Data Listed Above  * Additional Pertinent Lab Tests Reviewed:  [address] Admission Reviewed    Imaging:    Imaging Reports Reviewed Today Include: all  Imaging Personally Reviewed by Myself Includes:  none    Recent Cultures (last 7 days):           Last 24 Hours Medication List:   Current Facility-Administered Medications   Medication Dose Route Frequency Provider Last Rate    acetaminophen  650 mg Oral Q6H PRN Zeeshan Pederson PA-C      aspirin  81 mg Oral Daily Zeeshan Pederson PA-C      atorvastatin  20 mg Oral HS Zeeshan Pederson PA-C      calcium carbonate  1,000 mg Oral Daily PRN Zeeshan Pederson PA-C      carvedilol  6 25 mg Oral BID With Meals ARYAN Swain      enoxaparin  40 mg Subcutaneous Daily Zeeshan Pederson PA-C      furosemide  40 mg Intravenous Once Mindy ARYAN Meredith      furosemide  80 mg Intravenous BID (diuretic) ARYAN Cleveland      lisinopril  10 mg Oral Daily Zeeshan Pederson PA-C      ondansetron  4 mg Intravenous Q6H PRN Zeeshan Pederson PA-C      potassium chloride  20 mEq Oral TID With Meals ARYAN Cleveland      senna  1 tablet Oral Daily Zeeshan Pederson PA-C          Today, Patient Was Seen By: Mandi Abarca PA-C    ** Please Note: Dictation voice to text software may have been used in the creation of this document   **

## 2021-06-09 NOTE — PLAN OF CARE
Problem: PHYSICAL THERAPY ADULT  Goal: Performs mobility at highest level of function for planned discharge setting  See evaluation for individualized goals  Description: Treatment/Interventions: ADL retraining, Functional transfer training, LE strengthening/ROM, Elevations, Therapeutic exercise, Endurance training, Gait training          See flowsheet documentation for full assessment, interventions and recommendations  Outcome: Progressing  Note: Prognosis: Good  Problem List: Decreased endurance, Decreased mobility, Obesity  Assessment: Pt tolerated treatment well and is progressing with overall mobility tolerance  Able to ambulate overall increased ambulation distance, however requires seated rest breaks of 2-3` due to SOB  Pt reports willingness to use RW at home and reports he thinks he could make room for the RW by moving the [employer]  Reports increased overall ambulation stability with use of RW  Education provided once again on the safety concerns about using his office chair as an AD  Pt reports good understanding  Tolerates seated LE exercises well  Will continue to benefit from ongoing skilled PT to maximize his functional mobility and decrease his risk of falls  Recommending OPPT and family support at time of discharge when medically appropriate  PT Discharge Recommendation: Home with outpatient rehabilitation          See flowsheet documentation for full assessment

## 2021-06-10 LAB
ANION GAP SERPL CALCULATED.3IONS-SCNC: 6 MMOL/L (ref 4–13)
BUN SERPL-MCNC: 24 MG/DL (ref 5–25)
CALCIUM SERPL-MCNC: 8.8 MG/DL (ref 8.3–10.1)
CHLORIDE SERPL-SCNC: 100 MMOL/L (ref 100–108)
CO2 SERPL-SCNC: 32 MMOL/L (ref 21–32)
CREAT SERPL-MCNC: 1.08 MG/DL (ref 0.6–1.3)
GFR SERPL CREATININE-BSD FRML MDRD: 74 ML/MIN/1.73SQ M
GLUCOSE SERPL-MCNC: 124 MG/DL (ref 65–140)
POTASSIUM SERPL-SCNC: 4.2 MMOL/L (ref 3.5–5.3)
SODIUM SERPL-SCNC: 138 MMOL/L (ref 136–145)

## 2021-06-10 PROCEDURE — 99232 SBSQ HOSP IP/OBS MODERATE 35: CPT | Performed by: PHYSICIAN ASSISTANT

## 2021-06-10 PROCEDURE — 99232 SBSQ HOSP IP/OBS MODERATE 35: CPT | Performed by: INTERNAL MEDICINE

## 2021-06-10 PROCEDURE — 80048 BASIC METABOLIC PNL TOTAL CA: CPT | Performed by: PHYSICIAN ASSISTANT

## 2021-06-10 RX ORDER — FUROSEMIDE 10 MG/ML
80 INJECTION INTRAMUSCULAR; INTRAVENOUS
Status: DISCONTINUED | OUTPATIENT
Start: 2021-06-10 | End: 2021-06-11

## 2021-06-10 RX ADMIN — ATORVASTATIN CALCIUM 20 MG: 20 TABLET, FILM COATED ORAL at 21:27

## 2021-06-10 RX ADMIN — FUROSEMIDE 80 MG: 10 INJECTION, SOLUTION INTRAMUSCULAR; INTRAVENOUS at 17:52

## 2021-06-10 RX ADMIN — POTASSIUM CHLORIDE 20 MEQ: 1500 TABLET, EXTENDED RELEASE ORAL at 17:52

## 2021-06-10 RX ADMIN — LISINOPRIL 10 MG: 10 TABLET ORAL at 09:42

## 2021-06-10 RX ADMIN — ASPIRIN 81 MG: 81 TABLET, COATED ORAL at 09:41

## 2021-06-10 RX ADMIN — FUROSEMIDE 80 MG: 10 INJECTION, SOLUTION INTRAMUSCULAR; INTRAVENOUS at 12:48

## 2021-06-10 RX ADMIN — FUROSEMIDE 80 MG: 10 INJECTION, SOLUTION INTRAMUSCULAR; INTRAVENOUS at 09:46

## 2021-06-10 RX ADMIN — POTASSIUM CHLORIDE 20 MEQ: 1500 TABLET, EXTENDED RELEASE ORAL at 12:48

## 2021-06-10 RX ADMIN — ENOXAPARIN SODIUM 40 MG: 40 INJECTION SUBCUTANEOUS at 09:42

## 2021-06-10 RX ADMIN — SENNOSIDES 8.6 MG: 8.6 TABLET ORAL at 09:41

## 2021-06-10 RX ADMIN — CARVEDILOL 6.25 MG: 6.25 TABLET, FILM COATED ORAL at 09:46

## 2021-06-10 RX ADMIN — CARVEDILOL 6.25 MG: 6.25 TABLET, FILM COATED ORAL at 17:52

## 2021-06-10 RX ADMIN — POTASSIUM CHLORIDE 20 MEQ: 1500 TABLET, EXTENDED RELEASE ORAL at 09:48

## 2021-06-10 NOTE — PROGRESS NOTES
General Cardiology   Progress Note -  [facility]   Jennifer Callahan 61 y o  male MRN: [id number]    Unit/Bed#: S -01 Encounter: [id number]    Assessment/ Plan    1  Acute diastolic heart failure  On furosemide 80 mg IV BID  Patient remains significantly volume overload  Will increase to furosemide to TID  Monitor I/Os -1 7 L in 24 hours and -4 2 L since admission   Daily weights 436 lbs today  Unclear dry weight   On 2 gram Na diet and 1500 ml fluid restriction   Reviewed HF education with patient in length     2  History of atrial flutter ablation   On coreg 6 25 mg PO BID   Not on [address]     3  Hypertension 121/60 average BP   Continue lisinopril 10 mg PO daily and coreg  BP improving with diuresis     4  Morbid obesity counseled on lifestyle modifications     5  Hyperlipidemia LDL 60  Continue statin     6  NSVT   On BB  Not on telemetry     7  Untreated CASSIE   Follow up as outpatient     Subjective  Patient resting comfortable OOB  He reports he is feeling better each day  Continues with BARROSO  No chest pain or palpitations  No fever or chills  Review of Systems   Constitution: Positive for weight gain  Negative for chills, fever and malaise/fatigue  HENT: Negative for congestion  Cardiovascular: Positive for dyspnea on exertion and leg swelling  Negative for chest pain  Respiratory: Negative for cough and shortness of breath  Musculoskeletal: Negative for falls  Gastrointestinal: Positive for bloating  Negative for nausea and vomiting  Neurological: Negative for dizziness and light-headedness  Psychiatric/Behavioral: Negative for altered mental status  Objective:   Vitals: Blood pressure 118/60, pulse 80, temperature 97 9 °F (36 6 °C), temperature source Oral, resp  rate 16, height 5' 6 5" (1 689 m), weight (!) 198 kg (436 lb 2 9 oz), SpO2 90 %  ,       Body mass index is 69 35 kg/m²  ,     Systolic (11JNC), HNA:271 , Min:102 , LQI:130     Diastolic (11UXT), BYP:01, Min:49, Max:69          Intake/Output Summary (Last 24 hours) at 6/10/2021 0918  Last data filed at 6/10/2021 0020  Gross per 24 hour   Intake 240 ml   Output 1990 ml   Net -1750 ml     Weight (last 2 days)     Date/Time   Weight    06/10/21 0548   (!) 198 (436 18)    06/09/21 0600   (!) 201 (442 8)    06/08/21 0535   (!) 201 (443 8)    06/08/21 01:10:16   (!) 201 (443 8)    06/08/21 0100   (!) 201 (443 8)            Telemetry Review: No telemetry     Physical Exam  Constitutional:       General: He is not in acute distress  Appearance: He is obese  HENT:      Head: Normocephalic  Mouth/Throat:      Mouth: Mucous membranes are moist    Neck:      Musculoskeletal: Neck supple  Cardiovascular:      Rate and Rhythm: Normal rate and regular rhythm  Pulses: Normal pulses  Heart sounds: No murmur  Pulmonary:      Effort: Pulmonary effort is normal  No respiratory distress  Comments: Decreased  RA   Abdominal:      General: Bowel sounds are normal       Palpations: Abdomen is soft  Musculoskeletal: Normal range of motion  General: Swelling present  Comments: + 3 edema bilateral LE    Skin:     General: Skin is warm and dry  Neurological:      General: No focal deficit present  Mental Status: He is alert and oriented to person, place, and time     Psychiatric:         Mood and Affect: Mood normal          LABORATORY RESULTS  Results from last 7 days   Lab Units 06/08/21  0536 06/08/21  0134 06/07/21 2004   TROPONIN I ng/mL <0 02 <0 02 <0 02     CBC with diff:   Results from last 7 days   Lab Units 06/09/21  0447 06/08/21  0536 06/07/21 2004   WBC Thousand/uL 9 93 11 78* 10 83*   HEMOGLOBIN g/dL 12 6 12 5 12 8   HEMATOCRIT % 40 5 40 5 40 2   MCV fL 100* 100* 101*   PLATELETS Thousands/uL 211 229 211   MCH pg 31 0 30 9 32 1   MCHC g/dL 31 1* 30 9* 31 8   RDW % 13 0 13 1 13 0   MPV fL 10 4 10 4 10 0   NRBC AUTO /100 WBCs  --   --  0       CMP:  Results from last 7 days   Lab Units 06/10/21  0547 217 2136 21   POTASSIUM mmol/L 4 2 3 8 4 0 4 1   CHLORIDE mmol/L 100 101 102 101   CO2 mmol/L 32 32 29 32   BUN mg/dL 24 19 19 20   CREATININE mg/dL 1 08 0 94 0 94 0 97   CALCIUM mg/dL 8 8 9 0 8 9 9 0   AST U/L  --   --   --  13   ALT U/L  --   --   --  17   ALK PHOS U/L  --   --   --  88   EGFR ml/min/1 73sq m 74 88 88 84       BMP:  Results from last 7 days   Lab Units 06/10/21  0547 21   POTASSIUM mmol/L 4 2 3 8 4 0 4 1   CHLORIDE mmol/L 100 101 102 101   CO2 mmol/L 32 32 29 32   BUN mg/dL 24 19 19 20   CREATININE mg/dL 1 08 0 94 0 94 0 97   CALCIUM mg/dL 8 8 9 0 8 9 9 0       Lab Results   Component Value Date    NTBNP 425 (H) 2021    NTBNP 975 (H) 2021             Results from last 7 days   Lab Units 21  0536   MAGNESIUM mg/dL 2 1                           Lipid Profile:   No results found for: CHOL  Lab Results   Component Value Date    HDL 37 (L) 2021     Lab Results   Component Value Date    LDLCALC 60 2021     Lab Results   Component Value Date    TRIG 210 (H) 2021       Cardiac testing:   Results for orders placed during the hospital encounter of 21   Echo complete with contrast if indicated    Narrative [facility], [address] [facility]  [phone]    Transthoracic Echocardiogram  2D, M-mode, Doppler, and Color Doppler    Study date:  2021    Patient: Lilia Lerma  MR number: [id number]  Account number: [de-identified]  : 1960  Age: 61 years  Gender: Male  Status: Inpatient  Location: Bedside  Height: 66 in  Weight: 442 lb  BP: 106/ 58 mmHg    Indications: Heart failure      Diagnoses: I50 9 - Heart failure, unspecified    Sonographer:  DONELL Armstrong  Primary Physician:  Mitchel Narayan MD  Referring Physician:  Marvin Sykes MD  Group:  [id number]  Interpreting Physician:  Tong Ch, DO    SUMMARY    PROCEDURE INFORMATION:  This was a technically difficult study despite administration of echo contrast     LEFT VENTRICLE:  Systolic function was normal  Ejection fraction was estimated to be 60 %  Although no diagnostic regional wall motion abnormality was identified, this possibility cannot be completely excluded on the basis of this study  Wall thickness was mildly increased  LEFT ATRIUM:  The atrium was dilated  HISTORY: PRIOR HISTORY: HTN, HLD, morbid obesity, CASSIE, PAF, former smoker  PROCEDURE: The procedure was performed at the bedside  This was a routine study  The transthoracic approach was used  The study included complete 2D imaging, M-mode, complete spectral Doppler, and color Doppler  The heart rate was 74 bpm,  at the start of the study  Images were obtained from the parasternal, apical, subcostal, and suprasternal notch acoustic windows  Intravenous contrast ( 1 0ml Definity in NSS) was administered to opacify the left ventricle  Echocardiographic views were limited due to poor acoustic window availability and decreased penetration  This was a technically difficult study despite administration of echo contrast     LEFT VENTRICLE: Size was normal  Systolic function was normal  Ejection fraction was estimated to be 60 %  Although no diagnostic regional wall motion abnormality was identified, this possibility cannot be completely excluded on the basis  of this study  Wall thickness was mildly increased  DOPPLER: The study was not technically sufficient to allow evaluation of LV diastolic function  RIGHT VENTRICLE: Not well visualized  LEFT ATRIUM: The atrium was dilated  Not well visualized  RIGHT ATRIUM: Not well visualized  MITRAL VALVE: Valve structure was normal  There was normal leaflet separation  DOPPLER: The transmitral velocity was within the normal range  There was no evidence for stenosis  There was no significant regurgitation      AORTIC VALVE: The valve was trileaflet  Leaflets exhibited normal thickness and normal cuspal separation  DOPPLER: Transaortic velocity was within the normal range  There was no evidence for stenosis  There was no regurgitation  TRICUSPID VALVE: The valve structure was normal  There was normal leaflet separation  DOPPLER: The transtricuspid velocity was within the normal range  There was no evidence for stenosis  There was trace regurgitation  The tricuspid jet  envelope definition was inadequate for estimation of RV systolic pressure  PULMONIC VALVE: Leaflets exhibited normal thickness, no calcification, and normal cuspal separation  DOPPLER: The transpulmonic velocity was within the normal range  There was trace regurgitation  PERICARDIUM: There was no pericardial effusion  The pericardium was normal in appearance  AORTA: The root exhibited normal size  SYSTEMIC VEINS: IVC: Not assessed  SYSTEM MEASUREMENT TABLES    2D  %FS: 27 26 %  Ao Diam: 3 82 cm  EDV(Teich): 205 8 ml  EF(Teich): 52 02 %  ESV(Teich): 98 74 ml  IVSd: 1 1 cm  LA Diam: 6 04 cm  LVIDd: 6 36 cm  LVIDs: 4 63 cm  LVPWd: 1 1 cm  SV(Teich): 107 06 ml    CW  AV Env  Ti: 262 61 ms  AV MaxP 73 mmHg  AV VTI: 30 33 cm  AV Vmax: 1 85 m/s  AV Vmean: 1 16 m/s  AV meanP 25 mmHg    PW  E' Sept: 0 08 m/s  E/E' Sept: 11 31  LVOT Env  Ti: 293 05 ms  LVOT VTI: 20 29 cm  LVOT Vmax: 0 99 m/s  LVOT Vmean: 0 69 m/s  LVOT maxPG: 3 93 mmHg  LVOT meanP 18 mmHg  MV A Heber: 0 81 m/s  MV Dec Vermilion: 4 84 m/s2  MV DecT: 180 45 ms  MV E Hebre: 0 87 m/s  MV E/A Ratio: 1 08  MV PHT: 52 33 ms  MVA By PHT: 4 2 cm2  PV Acc Vermilion: 8 68 m/s2  PV AccT: 95 15 ms    [employer] Accredited Echocardiography Laboratory    Prepared and electronically signed by    Óscar Pickard DO  Signed 2021 17:34:37       Meds/Allergies   all current active meds have been reviewed and current meds:   Current Facility-Administered Medications   Medication Dose Route Frequency    acetaminophen (TYLENOL) tablet 650 mg  650 mg Oral Q6H PRN    aspirin (ECOTRIN LOW STRENGTH) EC tablet 81 mg  81 mg Oral Daily    atorvastatin (LIPITOR) tablet 20 mg  20 mg Oral HS    calcium carbonate (TUMS) chewable tablet 1,000 mg  1,000 mg Oral Daily PRN    carvedilol (COREG) tablet 6 25 mg  6 25 mg Oral BID With Meals    enoxaparin (LOVENOX) subcutaneous injection 40 mg  40 mg Subcutaneous Daily    furosemide (LASIX) injection 80 mg  80 mg Intravenous BID (diuretic)    lisinopril (ZESTRIL) tablet 10 mg  10 mg Oral Daily    ondansetron (ZOFRAN) injection 4 mg  4 mg Intravenous Q6H PRN    potassium chloride (K-DUR,KLOR-CON) CR tablet 20 mEq  20 mEq Oral TID With Meals    senna (SENOKOT) tablet 8 6 mg  1 tablet Oral Daily     Medications Prior to Admission   Medication    aspirin (ECOTRIN LOW STRENGTH) 81 mg EC tablet    atenolol (TENORMIN) 50 mg tablet    atorvastatin (LIPITOR) 20 mg tablet    cholecalciferol (VITAMIN D3) 25 mcg (1,000 units) tablet    Cyanocobalamin (VITAMIN E-26 PO)    folic acid (FOLVITE) 1 mg tablet    furosemide (LASIX) 20 mg tablet    lisinopril (ZESTRIL) 10 mg tablet     Counseling / Coordination of Care  Total floor / unit time spent today 20 minutes  Greater than 50% of total time was spent with the patient and / or family counseling and / or coordination of care  ** Please Note: Dragon 360 Dictation voice to text software may have been used in the creation of this document   **

## 2021-06-10 NOTE — ASSESSMENT & PLAN NOTE
Patient presented to outpatient cardiologist 6/7 for routine follow-up with evidence volume overload and suspected heart failure  Referred to the ER by cardiologist   Symptomatically patient reports worsening lower extremity edema and dyspnea exertion x1 month  Minimal improvement with Lasix 20 mg daily    Appreciate input from Cardiology  Increase IV Lasix again today to TID   LVEF on echocardiogram 60%  Cardiology on board  2 g sodium diet, fluid restriction, daily weights, I/O

## 2021-06-10 NOTE — OCCUPATIONAL THERAPY NOTE
Occupational Therapy Screen     Patient Name: Jurgen Miranda  SZVIW'E Date: 6/10/2021  Problem List  Principal Problem:    Acute diastolic heart failure ([facility] [address] )  Active Problems:    Essential hypertension    Mixed hyperlipidemia    Morbid obesity (HCC)    CASSIE (obstructive sleep apnea)    Venous stasis ulcers of both lower extremities (HCC)    Paroxysmal atrial fibrillation (HCC)    Leukocytosis    Past Medical History  Past Medical History:   Diagnosis Date    Arthritis     Atrial fibrillation ([facility] [address] )     Essential hypertension     Hyperlipidemia     Hypertension     Lower extremity edema     Obesity     CASSIE (obstructive sleep apnea)     Osteoarthritis of both knees     [facility]     Vitamin D deficiency      Past Surgical History  Past Surgical History:   Procedure Laterality Date    ATRIAL ABLATION SURGERY      KNEE SURGERY             06/10/21 0857   OT Last Visit   OT Visit Date 06/10/21  (Thursday)   Note Type   Note type Screen   Activity Tolerance   Nurse Made Aware per Sweetie MCDONALD appropriate to see pt  Spoke to PCA   Assessment   Assessment OT orders received and chart review completed  Contact made w/ pt from [phone]  Pt reports using bariatric RW to ambulate to / from bathroom w/ out assistance  Pt reports completing ADL at baseline level of I and no acute OT needs at this time  Recommend active particiaption in ADL tasks in acute care  Please re -consult if acute needs arise   DC OT     [facility], OTR/L

## 2021-06-10 NOTE — PHYSICAL THERAPY NOTE
PHYSICAL THERAPY NOTE    This is a cancel note no treatment was rendered       Patient Name: Lanny Cosby  QMYLF'D Date: 6/10/2021        06/10/21 0942   PT Last Visit   PT Visit Date 06/10/21   Note Type   Cancel Reasons Other   Assessment   Assessment pt is refusing PT intervention today  originally pt agreed to participate nursing staff came in to distribute medications and then pt stated he is just not in the mood now and changed his mind he does not want to participate in PT intervention  pt educated on the importance of daily participation and pt continues to refuse    Goals   Short Term Goal #1 Perform all transfers independently to restore PLOF and reduce caregiver burden, Ambulate at least 50 ft w/ bariatric RW w/o SpO2 decreasing below 90%, Navigate 6 stairs independently with unilateral handrail to facilitate return to previous living environment, Tolerate standing 5 minutes independently to facilitate functional task performance, Improve gait speed to 0 7 m/s to reduce fall risk and increase independence, and Perform TUG in less than 30 seconds with least restrictive AD to demonstrate improvements in mobility and reduce risk of falls       Eunice Husbands, PTA

## 2021-06-10 NOTE — ASSESSMENT & PLAN NOTE
Patient had EKG that showed slow atrial fibrillation however per Cardiology appears to be sinus rhythm  History of atrial flutter with prior flutter ablation  On telemetry, appears normal sinus - this was d/c   Not on anticoagulation, continue aspirin  Atenolol changed to Coreg

## 2021-06-10 NOTE — CASE MANAGEMENT
LOS: 3 DAYS  PATIENT IS A BUNDLE  PATIENT IS NOT A READMISSION  UNPLANNED RISK OF READMISSION: 9      CM met with the Patient at the bedside; Patient was alert and oriented, sitting up in bed  CM introduced self and role  Patient resides in a 2-story home with his spouse, Kusum Watst  There are 7 LUIS and there is a railing  Prior to admission, Patient was independent with all ADLs and utilized a RW to ambulate  Patient also has a shower chair at home  Patient denies any VNA/STR history  Patient utilizes [facility] in [place] and is able to afford all of her medications  Patient denies any MH/substance use history or concerns  Patient identifies his spouse, Kusum Watts, as his health care representative  Patient states that he does not currently have a POA, or AD, but that he recently received paperwork from his PCP and declined CM offer for additional resources at this time  Patient is currently on disability and receives SSD as his main source of income  Patient sees his PCP, Dr Kiki Bolton, every 3 months  Patient reports that he has not driven in 2 months and his spouse provides all transportation needs  Patient states that his spouse will provide transportation upon dc  CM reviewed the recommendation for OPPT with the Patient  Patient verbalized his understanding of the recommendation  CM provided the Patient with a resource list of [facility] offices and explained that he will receive a Rx upon dc  Patient denies any additional CM needs at this time  CM dept will continue to follow the Patient through dc and monitor for any additional needs  CM reviewed discharge planning process including the following: identifying caregivers at home, preference for d/c planning needs, availability of treatment team to discuss questions or concerns patient and/or family may have regarding diagnosis, plan of care, old or new medications and discharge planning   CM will continue to follow for care coordination and update assessment as appropriate

## 2021-06-10 NOTE — PROGRESS NOTES
[facility]  Progress Note - Mariia Arzola 1960, 61 y o  male MRN: [id number]  Unit/Bed#: S -01 Encounter: [id number]  Primary Care Provider: Sushil Montanez MD   Date and time admitted to hospital: 6/7/2021  7:27 PM    * Acute diastolic heart failure [facility])  Assessment & Plan  Patient presented to outpatient cardiologist 6/7 for routine follow-up with evidence volume overload and suspected heart failure  Referred to the ER by cardiologist   Symptomatically patient reports worsening lower extremity edema and dyspnea exertion x1 month  Minimal improvement with Lasix 20 mg daily  Appreciate input from Cardiology  Increase IV Lasix again today to TID   LVEF on echocardiogram 60%  Cardiology on board  2 g sodium diet, fluid restriction, daily weights, I/O    Paroxysmal atrial fibrillation ([address] )  Assessment & Plan  Patient had EKG that showed slow atrial fibrillation however per Cardiology appears to be sinus rhythm  History of atrial flutter with prior flutter ablation  On telemetry, appears normal sinus - this was d/c   Not on anticoagulation, continue aspirin  Atenolol changed to Coreg    CASSIE (obstructive sleep apnea)  Assessment & Plan  Noncompliant with PAP therapy, states he was unable to tolerate it    Morbid obesity (HCC)  Assessment & Plan  Recommend diet, weight loss, lifestyle modifications  BMI greater than 70  follows outpatient bariatric    Mixed hyperlipidemia  Assessment & Plan  Continue Lipitor  LDL 60    Essential hypertension  Assessment & Plan  Continue lisinopril, atenolol changed to Coreg  Monitor with diuresis    VTE Pharmacologic Prophylaxis:   Pharmacologic: Enoxaparin (Lovenox)  Mechanical VTE Prophylaxis in Place: Yes    Patient Centered Rounds: I have performed bedside rounds with nursing staff today      Discussions with Specialists or Other Care Team Provider: nursing     Education and Discussions with Family / Patient: patient, called wife but non working number     Time Spent for Care: 30 minutes  More than 50% of total time spent on counseling and coordination of care as described above  Current Length of Stay: 3 day(s)    Current Patient Status: Inpatient   Certification Statement: The patient will continue to require additional inpatient hospital stay due to pending improvement in volume overload on IV diuresis     Discharge Plan: not medically stable     Code Status: Level 1 - Full Code    Subjective:   Pt seen and examined at bedside  Patient reports mild improvement each day but does continue to feel dyspneic with exertion  Ambulating with walker  Notes feet are much improved but leg still swollen  Objective:     Vitals:   Temp (24hrs), Av 8 °F (36 6 °C), Min:97 7 °F (36 5 °C), Max:97 9 °F (36 6 °C)    Temp:  [97 7 °F (36 5 °C)-97 9 °F (36 6 °C)] 97 9 °F (36 6 °C)  HR:  [80-84] 80  Resp:  [16-19] 16  BP: (102-132)/(49-72) 130/72  SpO2:  [90 %-99 %] 90 %  Body mass index is 69 35 kg/m²  Input and Output Summary (last 24 hours): Intake/Output Summary (Last 24 hours) at 6/10/2021 1007  Last data filed at 6/10/2021 0020  Gross per 24 hour   Intake --   Output 1240 ml   Net -1240 ml       Physical Exam:     Physical Exam  Constitutional:       Appearance: Normal appearance  He is obese  HENT:      Head: Normocephalic and atraumatic  Mouth/Throat:      Mouth: Mucous membranes are moist    Eyes:      Extraocular Movements: Extraocular movements intact  Neck:      Musculoskeletal: Normal range of motion and neck supple  Cardiovascular:      Rate and Rhythm: Normal rate and regular rhythm  Pulmonary:      Effort: Pulmonary effort is normal       Breath sounds: Normal breath sounds  Comments: Decreased at bases  Abdominal:      General: Bowel sounds are normal       Palpations: Abdomen is soft  Musculoskeletal: Normal range of motion  General: Swelling present  Right lower leg: Edema present        Left lower leg: Edema present  Skin:     General: Skin is warm and dry  Neurological:      General: No focal deficit present  Mental Status: He is alert and oriented to person, place, and time  Psychiatric:         Mood and Affect: Mood normal        Additional Data:     Labs:    Results from last 7 days   Lab Units 06/09/21  0447  06/07/21 2004   WBC Thousand/uL 9 93   < > 10 83*   HEMOGLOBIN g/dL 12 6   < > 12 8   HEMATOCRIT % 40 5   < > 40 2   PLATELETS Thousands/uL 211   < > 211   NEUTROS PCT %  --   --  67   LYMPHS PCT %  --   --  20   MONOS PCT %  --   --  6   EOS PCT %  --   --  5    < > = values in this interval not displayed  Results from last 7 days   Lab Units 06/10/21  0547  06/07/21 2004   SODIUM mmol/L 138   < > 137   POTASSIUM mmol/L 4 2   < > 4 1   CHLORIDE mmol/L 100   < > 101   CO2 mmol/L 32   < > 32   BUN mg/dL 24   < > 20   CREATININE mg/dL 1 08   < > 0 97   ANION GAP mmol/L 6   < > 4   CALCIUM mg/dL 8 8   < > 9 0   ALBUMIN g/dL  --   --  3 2*   TOTAL BILIRUBIN mg/dL  --   --  0 57   ALK PHOS U/L  --   --  88   ALT U/L  --   --  17   AST U/L  --   --  13   GLUCOSE RANDOM mg/dL 124   < > 101    < > = values in this interval not displayed  * I Have Reviewed All Lab Data Listed Above  * Additional Pertinent Lab Tests Reviewed:  [facility]    Imaging:    Imaging Reports Reviewed Today Include: all  Imaging Personally Reviewed by Myself Includes:  none    Recent Cultures (last 7 days):           Last 24 Hours Medication List:   Current Facility-Administered Medications   Medication Dose Route Frequency Provider Last Rate    acetaminophen  650 mg Oral Q6H PRN Zeeshan Pederson PA-C      aspirin  81 mg Oral Daily Zeeshan Pederson PA-C      atorvastatin  20 mg Oral HS Zeeshan Pederson PA-C      calcium carbonate  1,000 mg Oral Daily PRN Zeeshan Pederson PA-C      carvedilol  6 25 mg Oral BID With Meals Deloris Banerjee ARYAN      enoxaparin  40 mg Subcutaneous Daily Zeeshan Pederson PA-C      furosemide  80 mg Intravenous TID (diuretic) ARYAN Mckeon      lisinopril  10 mg Oral Daily Zeeshan Pederson PA-C      ondansetron  4 mg Intravenous Q6H PRN Zeeshan Pederson PA-C      potassium chloride  20 mEq Oral TID With Meals ARYAN Peralta      senna  1 tablet Oral Daily Zeeshan Pederson PA-C          Today, Patient Was Seen By: Nilsa Figueroa PA-C    ** Please Note: Dictation voice to text software may have been used in the creation of this document   **

## 2021-06-11 PROBLEM — D72.829 LEUKOCYTOSIS: Status: RESOLVED | Noted: 2021-06-08 | Resolved: 2021-06-11

## 2021-06-11 LAB
ANION GAP SERPL CALCULATED.3IONS-SCNC: 8 MMOL/L (ref 4–13)
BUN SERPL-MCNC: 27 MG/DL (ref 5–25)
CALCIUM SERPL-MCNC: 8.9 MG/DL (ref 8.3–10.1)
CHLORIDE SERPL-SCNC: 99 MMOL/L (ref 100–108)
CO2 SERPL-SCNC: 30 MMOL/L (ref 21–32)
CREAT SERPL-MCNC: 1.22 MG/DL (ref 0.6–1.3)
GFR SERPL CREATININE-BSD FRML MDRD: 64 ML/MIN/1.73SQ M
GLUCOSE SERPL-MCNC: 128 MG/DL (ref 65–140)
POTASSIUM SERPL-SCNC: 4 MMOL/L (ref 3.5–5.3)
SODIUM SERPL-SCNC: 137 MMOL/L (ref 136–145)

## 2021-06-11 PROCEDURE — 97110 THERAPEUTIC EXERCISES: CPT

## 2021-06-11 PROCEDURE — 99232 SBSQ HOSP IP/OBS MODERATE 35: CPT | Performed by: INTERNAL MEDICINE

## 2021-06-11 PROCEDURE — 97116 GAIT TRAINING THERAPY: CPT

## 2021-06-11 PROCEDURE — 80048 BASIC METABOLIC PNL TOTAL CA: CPT | Performed by: NURSE PRACTITIONER

## 2021-06-11 RX ORDER — BUMETANIDE 0.25 MG/ML
2 INJECTION, SOLUTION INTRAMUSCULAR; INTRAVENOUS 3 TIMES DAILY
Status: DISCONTINUED | OUTPATIENT
Start: 2021-06-11 | End: 2021-06-14

## 2021-06-11 RX ORDER — LISINOPRIL 5 MG/1
5 TABLET ORAL DAILY
Status: DISCONTINUED | OUTPATIENT
Start: 2021-06-12 | End: 2021-06-15 | Stop reason: HOSPADM

## 2021-06-11 RX ADMIN — CARVEDILOL 6.25 MG: 6.25 TABLET, FILM COATED ORAL at 17:30

## 2021-06-11 RX ADMIN — POTASSIUM CHLORIDE 20 MEQ: 1500 TABLET, EXTENDED RELEASE ORAL at 17:30

## 2021-06-11 RX ADMIN — BUMETANIDE 2 MG: 0.25 INJECTION INTRAMUSCULAR; INTRAVENOUS at 19:04

## 2021-06-11 RX ADMIN — ENOXAPARIN SODIUM 40 MG: 40 INJECTION SUBCUTANEOUS at 09:37

## 2021-06-11 RX ADMIN — POTASSIUM CHLORIDE 20 MEQ: 1500 TABLET, EXTENDED RELEASE ORAL at 12:56

## 2021-06-11 RX ADMIN — SENNOSIDES 8.6 MG: 8.6 TABLET ORAL at 09:37

## 2021-06-11 RX ADMIN — CALCIUM CARBONATE (ANTACID) CHEW TAB 500 MG 1000 MG: 500 CHEW TAB at 12:59

## 2021-06-11 RX ADMIN — FUROSEMIDE 80 MG: 10 INJECTION, SOLUTION INTRAMUSCULAR; INTRAVENOUS at 06:07

## 2021-06-11 RX ADMIN — ASPIRIN 81 MG: 81 TABLET, COATED ORAL at 09:37

## 2021-06-11 RX ADMIN — ATORVASTATIN CALCIUM 20 MG: 20 TABLET, FILM COATED ORAL at 23:59

## 2021-06-11 RX ADMIN — POTASSIUM CHLORIDE 20 MEQ: 1500 TABLET, EXTENDED RELEASE ORAL at 09:39

## 2021-06-11 RX ADMIN — BUMETANIDE 2 MG: 0.25 INJECTION INTRAMUSCULAR; INTRAVENOUS at 12:56

## 2021-06-11 NOTE — ASSESSMENT & PLAN NOTE
· Resolved  · Likely reactive  · No other signs and symptoms of an active infection  · Monitor  · For possible workup and management if this worsens significantly

## 2021-06-11 NOTE — ASSESSMENT & PLAN NOTE
Patient presented to outpatient cardiologist 6/7 for routine follow-up with evidence volume overload and suspected heart failure  Referred to the ER by cardiologist   Symptomatically patient reports worsening lower extremity edema and dyspnea exertion x1 month  Minimal improvement with Lasix 20 mg daily  Appreciate input from Cardiology  On IV Lasix as; according to cardiologist, with not much urine output yesterday, this will be switched to IV Bumex  With low blood pressures, cardiologist held off morning dose of lisinopril and decreased to 5 mg starting tomorrow    LVEF on echocardiogram 60%  Cardiology on board  2 g sodium diet, fluid restriction, daily weights, I/O

## 2021-06-11 NOTE — PROGRESS NOTES
[facility]  Progress Note - Gingerjohnny Rai 1960, 61 y o  male MRN: [id number]  Unit/Bed#: S -01 Encounter: [id number]  Primary Care Provider: Andree Myles MD   Date and time admitted to hospital: 6/7/2021  7:27 PM    * Acute diastolic heart failure [facility])  Assessment & Plan  Patient presented to outpatient cardiologist 6/7 for routine follow-up with evidence volume overload and suspected heart failure  Referred to the ER by cardiologist   Symptomatically patient reports worsening lower extremity edema and dyspnea exertion x1 month  Minimal improvement with Lasix 20 mg daily  Appreciate input from Cardiology  On IV Lasix as; according to cardiologist, with not much urine output yesterday, this will be switched to IV Bumex  With low blood pressures, cardiologist held off morning dose of lisinopril and decreased to 5 mg starting tomorrow  LVEF on echocardiogram 60%  Cardiology on board  2 g sodium diet, fluid restriction, daily weights, I/O    Paroxysmal atrial fibrillation ([address] )  Assessment & Plan  Patient had EKG that showed slow atrial fibrillation however per Cardiology appears to be sinus rhythm  History of atrial flutter with prior flutter ablation  On telemetry, appears normal sinus - this was d/c   Not on anticoagulation, continue aspirin  Atenolol changed to Coreg    CASSIE (obstructive sleep apnea)  Assessment & Plan  Noncompliant with PAP therapy, states he was unable to tolerate it    Morbid obesity (HCC)  Assessment & Plan  Recommend diet, weight loss, lifestyle modifications  BMI greater than 70  follows outpatient bariatric    Mixed hyperlipidemia  Assessment & Plan  Continue Lipitor  LDL 60    Essential hypertension  Assessment & Plan  Continue lisinopril, atenolol changed to Coreg  Monitor with diuresis  Lisinopril dose was held off today due to low blood pressures and will be started again tomorrow at 5 mg once a day      Leukocytosis-resolved as of 2021  Assessment & Plan  · Resolved  · Likely reactive  · No other signs and symptoms of an active infection  · Monitor  · For possible workup and management if this worsens significantly  VTE Pharmacologic Prophylaxis:   Pharmacologic: Enoxaparin (Lovenox)  Mechanical VTE Prophylaxis in Place: Yes    Patient Centered Rounds: I have performed bedside rounds with nursing staff today  Discussions with Specialists or Other Care Team Provider:  Case management  Cardiologist service  Education and Discussions with Family / Patient:  I discussed with the patient our findings, management and plans  Answered questions and concerns  He is okay with the management and plans  I offered to call patient's family, but patient declined  Time Spent for Care: 30 minutes  More than 50% of total time spent on counseling and coordination of care as described above  Current Length of Stay: 4 day(s)    Current Patient Status: Inpatient   Certification Statement: The patient will continue to require additional inpatient hospital stay due to Above findings and plans  Discharge Plan:  None yet  Code Status: Level 1 - Full Code      Subjective:   Patient is doing fine and feels a lot better  Patient denies any more shortness of breath  Patient's lower extremities still are swollen, but not as bad as before  No pains  No other complaints  Objective:     Vitals:   Temp (24hrs), Av 8 °F (36 6 °C), Min:97 6 °F (36 4 °C), Max:98 1 °F (36 7 °C)    Temp:  [97 6 °F (36 4 °C)-98 1 °F (36 7 °C)] 97 6 °F (36 4 °C)  HR:  [73-83] 83  Resp:  [16-18] 18  BP: ()/(50-69) 137/60  SpO2:  [90 %-92 %] 90 %  Body mass index is 69 kg/m²  Input and Output Summary (last 24 hours):        Intake/Output Summary (Last 24 hours) at 2021 1454  Last data filed at 2021 0901  Gross per 24 hour   Intake --   Output 1200 ml   Net -1200 ml       Physical Exam:     Physical Exam  Vitals signs and nursing note reviewed  Constitutional:       General: He is not in acute distress  Appearance: He is obese  He is not ill-appearing, toxic-appearing or diaphoretic  Cardiovascular:      Rate and Rhythm: Normal rate and regular rhythm  Heart sounds: Normal heart sounds  No murmur  No friction rub  No gallop  Pulmonary:      Effort: Pulmonary effort is normal  No respiratory distress  Breath sounds: Normal breath sounds  No stridor  No wheezing, rhonchi or rales  Abdominal:      General: Bowel sounds are normal  There is no distension  Palpations: Abdomen is soft  Tenderness: There is no abdominal tenderness  Musculoskeletal:      Right lower leg: Edema present  Left lower leg: Edema present  Skin:     General: Skin is warm  Coloration: Skin is not pale  Findings: No erythema or rash  Neurological:      General: No focal deficit present  Mental Status: He is alert  Psychiatric:         Mood and Affect: Mood normal          Behavior: Behavior normal          Thought Content: Thought content normal               Additional Data:     Labs:    Results from last 7 days   Lab Units 06/09/21  0447  06/07/21 2004   WBC Thousand/uL 9 93   < > 10 83*   HEMOGLOBIN g/dL 12 6   < > 12 8   HEMATOCRIT % 40 5   < > 40 2   PLATELETS Thousands/uL 211   < > 211   NEUTROS PCT %  --   --  67   LYMPHS PCT %  --   --  20   MONOS PCT %  --   --  6   EOS PCT %  --   --  5    < > = values in this interval not displayed  Results from last 7 days   Lab Units 06/11/21  0506  06/07/21 2004   POTASSIUM mmol/L 4 0   < > 4 1   CHLORIDE mmol/L 99*   < > 101   CO2 mmol/L 30   < > 32   BUN mg/dL 27*   < > 20   CREATININE mg/dL 1 22   < > 0 97   CALCIUM mg/dL 8 9   < > 9 0   ALK PHOS U/L  --   --  88   ALT U/L  --   --  17   AST U/L  --   --  13    < > = values in this interval not displayed  * I Have Reviewed All Lab Data Listed Above  * Additional Pertinent Lab Tests Reviewed:  All Labs For Current Hospital Admission Reviewed    Imaging:    Imaging Reports Reviewed Today Include:  Diagnostic imaging studies that were done on this admission  Imaging Personally Reviewed by Myself Includes:  None  Recent Cultures (last 7 days):           Last 24 Hours Medication List:   Current Facility-Administered Medications   Medication Dose Route Frequency Provider Last Rate    acetaminophen  650 mg Oral Q6H PRN Zeeshan Pederson PA-C      aspirin  81 mg Oral Daily Zeeshan Pederson PA-C      atorvastatin  20 mg Oral HS Zeeshan Pederson PA-C      bumetanide  2 mg Intravenous TID ARYAN Ann      calcium carbonate  1,000 mg Oral Daily PRN Zeeshan Pederson PA-C      carvedilol  6 25 mg Oral BID With Meals Versa ARYAN Lemons      enoxaparin  40 mg Subcutaneous Daily Zeeshan Pederson PA-C      [START ON 6/12/2021] lisinopril  5 mg Oral Daily ARYAN Ann      ondansetron  4 mg Intravenous Q6H PRN Zeeshan Pederson PA-C      potassium chloride  20 mEq Oral TID With Meals Versa ARYAN Lemons      senna  1 tablet Oral Daily Zeeshan Pederson PA-C          Today, Patient Was Seen By: Ana Shaw MD    ** Please Note: Dragon 360 Dictation voice to text software may have been used in the creation of this document   **

## 2021-06-11 NOTE — PLAN OF CARE
Problem: PHYSICAL THERAPY ADULT  Goal: Performs mobility at highest level of function for planned discharge setting  See evaluation for individualized goals  Description: Treatment/Interventions: ADL retraining, Functional transfer training, LE strengthening/ROM, Elevations, Therapeutic exercise, Endurance training, Gait training          See flowsheet documentation for full assessment, interventions and recommendations  Outcome: Progressing  Note: Prognosis: Good  Problem List: Decreased endurance, Decreased mobility, Obesity  Assessment: pt began tx session seated OOB in the recliner and was agreeable to participate in PT intervention  pt provided education on OOB mobility techniques such as gait and tansfers with kendy RW and stair training  pt continues to remain consistant with mod I for all transfers to and from [facility]  pt was able to tolerate 2 min of static standing balance with no LOB before requiring a seated rest break in the recliner  pt continues to reamin /s for ambulation with kendy RW with VC's for kendy RW placement as pt would advance RW to far ahead while ambulating  pt was able to complete 4 steps in todays tx session with R sided hand rail and min Ax1 with VC's for foot placement  pt was able to complete all TE seated in the recliner with no increase in pain but required several rest breaks secondary to becoming fatigued  pt would benefit from continued focus on OOB mobility with progression of ambulation and continuation of stair training as appropriate  post tx pt seated in the recliner with call bell and chair alarm activated  PT Discharge Recommendation: Home with outpatient rehabilitation          See flowsheet documentation for full assessment

## 2021-06-11 NOTE — ASSESSMENT & PLAN NOTE
Continue lisinopril, atenolol changed to Coreg  Monitor with diuresis  Lisinopril dose was held off today due to low blood pressures and will be started again tomorrow at 5 mg once a day

## 2021-06-11 NOTE — DISCHARGE INSTRUCTIONS
Take your medications as directed, and keep your follow up appointments  Adhere to a heart healthy lifestyle, maintaining a low sodium diet  Daily weight and record    If your weight increases 2-3 lbs in one day, or 5 lbs in 2 days, you are short of breath or have lower extremity swelling, please call Nurse Kim Larsen at  [place] [address] office  at [phone]

## 2021-06-11 NOTE — PHYSICAL THERAPY NOTE
PHYSICAL THERAPY NOTE         21 0836   PT Last Visit   PT Visit Date 21   Note Type   Note Type Treatment   Pain Assessment   Pain Assessment Tool 0-10   Pain Score No Pain   Patient's Stated Pain Goal No pain   Hospital Pain Intervention(s) Repositioned; Ambulation/increased activity; Emotional support; Rest   Multiple Pain Sites No   Restrictions/Precautions   Weight Bearing Precautions Per Order No   Other Precautions Fall Risk   General   Chart Reviewed Yes   Response to Previous Treatment Patient with no complaints from previous session  Family/Caregiver Present No   Cognition   Overall Cognitive Status WFL   Arousal/Participation Alert; Responsive; Cooperative   Attention Within functional limits   Orientation Level Oriented X4   Memory Other (Comment)   Following Commands Follows all commands and directions without difficulty   Comments pt identified by name and     Subjective   Subjective pt is agreeable to participate in PT intervention    Bed Mobility   Rolling R Unable to assess   Rolling L Unable to assess   Supine to Sit Unable to assess   Sit to Supine Unable to assess   Additional Comments pt seated OOB in the recliner pre/post tx session    Transfers   Sit to Stand 6  Modified independent   Additional items Armrests; Increased time required   Stand to Sit 6  Modified independent   Additional items Armrests; Increased time required   Additional Comments pt required kendy RW to complete all functional transfers    Ambulation/Elevation   Gait pattern Foward flexed; Wide KARMEN; Decreased foot clearance; Short stride; Excessively slow  (kendy RW for UE supoort while ambulating )   Gait Assistance 5  Supervision   Additional items Verbal cues   Assistive Device Bariatric Rolling walker   Distance 60'x1    Stair Management Assistance 4  Minimal assist   Additional items Assist x 1;Verbal cues; Increased time required   Stair Management Technique One rail R;Step to pattern   Number of Stairs 4   Balance   Static Sitting Good   Dynamic Sitting Fair   Static Standing Fair +   Dynamic Standing Fair   Ambulatory Fair   Endurance Deficit   Endurance Deficit Yes   Endurance Deficit Description pt has limited functional mobility and activity tolerance    Activity Tolerance   Activity Tolerance Patient limited by fatigue;Patient limited by pain   Nurse Made Aware Spoke to RN Cici    Exercises   Hip Adduction Sitting;20 reps;AROM; Bilateral   Knee AROM Long Arc Quad Sitting;20 reps;AROM; Bilateral   Ankle Pumps Sitting;20 reps;AROM; Bilateral   Marching Sitting;20 reps;AROM; Bilateral   Balance training  2 min of static sranding with kendy RW    Assessment   Prognosis Good   Problem List Decreased endurance;Decreased mobility;Obesity   Assessment pt began tx session seated OOB in the recliner and was agreeable to participate in PT intervention  pt provided education on OOB mobility techniques such as gait and tansfers with kendy RW and stair training  pt continues to remain consistant with mod I for all transfers to and from [facility]  pt was able to tolerate 2 min of static standing balance with no LOB before requiring a seated rest break in the recliner  pt continues to reamin /s for ambulation with kendy RW with VC's for kendy RW placement as pt would advance RW to far ahead while ambulating  pt was able to complete 4 steps in todays tx session with R sided hand rail and min Ax1 with VC's for foot placement  pt was able to complete all TE seated in the recliner with no increase in pain but required several rest breaks secondary to becoming fatigued  pt would benefit from continued focus on OOB mobility with progression of ambulation and continuation of stair training as appropriate  post tx pt seated in the recliner with call bell and chair alarm activated     Goals   Patient Goals to go home    STG Expiration Date 06/18/21   PT Treatment Day 2   Plan Treatment/Interventions Functional transfer training;LE strengthening/ROM; Elevations; Therapeutic exercise; Endurance training;Patient/family training;Equipment eval/education;Gait training;Bed mobility   Progress Slow progress, decreased activity tolerance   PT Frequency Other (Comment)  (3-5x week )   Recommendation   PT Discharge Recommendation Home with outpatient rehabilitation   Equipment Recommended [facility] [facility] Recommended HD Bariatric wheeled walker   Skjong 8 in Bed Without Bedrails 4   Lying on Back to Sitting on Edge of Flat Bed 4   Moving Bed to Chair 4   Standing Up From Chair 4   Walk in Room 3   Climb 3-5 Stairs 3   Basic Mobility Inpatient Raw Score 22   Basic Mobility Standardized Score 47 4       Patient Name: Aleja Wayne  LCCZL'B Date: 6/11/2021     Ml Abdalla PTA

## 2021-06-11 NOTE — PROGRESS NOTES
General Cardiology   Progress Note -  [facility]   Danny Cao 61 y o  male MRN: [id number]    Unit/Bed#: S -01 Encounter: [id number]    Assessment/ Plan    1  Acute diastolic heart failure  On furosemide 80 mg IV TID  Monitor I/Os -1 2 L in 24 hours and - 5 4 L since admission   Will change furosemide to Bumex   Creatine 1 2 today  Check BMP in am   Daily weights: 434 lbs today from 436 lbs yesterday   On 2 gram Na diet and 1500 ml fluid restriction   Reviewed HF education with patient     2  History of atrial flutter ablation   On coreg 6 25 mg PO BID   Not on [address]      3  Hypertension 119/61 average BP   On lisinopril 10 mg PO daily and coreg  Bp borderline this mornin/50  Will hold lisinopril today and resume tomorrow at 5 mg PO daily       4  Morbid obesity counseled on lifestyle modifications      5  Hyperlipidemia LDL 60  Continue statin      6  NSVT   On BB  Not on telemetry      7  Untreated CASSIE   Follow up as outpatient     Subjective  Patient resting in chair  He reports some mild lower back pain  No SOB, palpitations or chest pain  He continues with lower extremity edema  Review of Systems   Constitution: Negative for chills, fever and malaise/fatigue  Cardiovascular: Positive for leg swelling  Negative for chest pain, orthopnea and palpitations  Respiratory: Negative for cough and shortness of breath  Musculoskeletal: Positive for back pain  Negative for falls  Gastrointestinal: Positive for bloating  Negative for nausea and vomiting  Neurological: Negative for dizziness and light-headedness  Psychiatric/Behavioral: Negative for altered mental status  All other systems reviewed and are negative  Objective:   Vitals: Blood pressure 98/50, pulse 80, temperature 97 9 °F (36 6 °C), temperature source Oral, resp  rate 18, height 5' 6 5" (1 689 m), weight (!) 198 kg (436 lb 2 9 oz), SpO2 92 %  ,       Body mass index is 69 35 kg/m²  ,     Systolic (32NUG), OBZ:873 , Min:98 , KFT:385     Diastolic (09OSG), LT, Min:50, Max:72          Intake/Output Summary (Last 24 hours) at 2021 09  Last data filed at 6/10/2021 2101  Gross per 24 hour   Intake 260 ml   Output 1500 ml   Net -1240 ml     Weight (last 2 days)     Date/Time   Weight    06/10/21 0548   (!) 198 (436 18)    21 0600   (!) 201 (442 8)            Physical Exam  Constitutional:       Appearance: He is obese  HENT:      Head: Normocephalic  Mouth/Throat:      Mouth: Mucous membranes are moist    Neck:      Musculoskeletal: Neck supple  Cardiovascular:      Rate and Rhythm: Normal rate and regular rhythm  Pulses: Normal pulses  Pulmonary:      Effort: Pulmonary effort is normal  No respiratory distress  Breath sounds: Normal breath sounds  Comments: RA  Abdominal:      General: Bowel sounds are normal  There is distension  Palpations: Abdomen is soft  Musculoskeletal: Normal range of motion  General: Swelling present  Comments: + 3 edema bilateral LE    Skin:     General: Skin is warm and dry  Capillary Refill: Capillary refill takes less than 2 seconds  Neurological:      General: No focal deficit present  Mental Status: He is alert and oriented to person, place, and time     Psychiatric:         Mood and Affect: Mood normal          LABORATORY RESULTS  Results from last 7 days   Lab Units 21  0536 21  0134 21   TROPONIN I ng/mL <0 02 <0 02 <0 02     CBC with diff:   Results from last 7 days   Lab Units 21  0447 21  0536 21   WBC Thousand/uL 9 93 11 78* 10 83*   HEMOGLOBIN g/dL 12 6 12 5 12 8   HEMATOCRIT % 40 5 40 5 40 2   MCV fL 100* 100* 101*   PLATELETS Thousands/uL 211 229 211   MCH pg 31 0 30 9 32 1   MCHC g/dL 31 1* 30 9* 31 8   RDW % 13 0 13 1 13 0   MPV fL 10 4 10 4 10 0   NRBC AUTO /100 WBCs  --   --  0       CMP:  Results from last 7 days   Lab Units 21  0506 06/10/21  0547 21  0447 2136 21   POTASSIUM mmol/L 4 0 4 2 3 8 4 0 4 1   CHLORIDE mmol/L 99* 100 101 102 101   CO2 mmol/L 30 32 32 29 32   BUN mg/dL 27* 24 19 19 20   CREATININE mg/dL 1 22 1 08 0 94 0 94 0 97   CALCIUM mg/dL 8 9 8 8 9 0 8 9 9 0   AST U/L  --   --   --   --  13   ALT U/L  --   --   --   --  17   ALK PHOS U/L  --   --   --   --  88   EGFR ml/min/1 73sq m 64 74 88 88 84       BMP:  Results from last 7 days   Lab Units 21  0506 06/10/21  0547 21  0447 21  0536 21   POTASSIUM mmol/L 4 0 4 2 3 8 4 0 4 1   CHLORIDE mmol/L 99* 100 101 102 101   CO2 mmol/L 30 32 32 29 32   BUN mg/dL 27* 24 19 19 20   CREATININE mg/dL 1 22 1 08 0 94 0 94 0 97   CALCIUM mg/dL 8 9 8 8 9 0 8 9 9 0       Lab Results   Component Value Date    NTBNP 425 (H) 2021    NTBNP 975 (H) 2021             Results from last 7 days   Lab Units 21  0536   MAGNESIUM mg/dL 2 1                           Lipid Profile:   No results found for: CHOL  Lab Results   Component Value Date    HDL 37 (L) 2021     Lab Results   Component Value Date    LDLCALC 60 2021     Lab Results   Component Value Date    TRIG 210 (H) 2021       Cardiac testing:   Results for orders placed during the hospital encounter of 21   Echo complete with contrast if indicated    Narrative [facility] [address] [facility]  [phone]    Transthoracic Echocardiogram  2D, M-mode, Doppler, and Color Doppler    Study date:  2021    Patient: Angeles Gutierrez  MR number: [id number]  Account number: [de-identified]  : 1960  Age: 61 years  Gender: Male  Status: Inpatient  Location: Bedside  Height: 66 in  Weight: 442 lb  BP: 106/ 58 mmHg    Indications: Heart failure      Diagnoses: I50 9 - Heart failure, unspecified    Sonographer:  DONELL Lopez  Primary Physician:  Angelina Liz MD  Referring Physician:  Hussain Cedeño MD  Group:  [facility]  Interpreting Physician:  Jessica Nicole DO    SUMMARY    PROCEDURE INFORMATION:  This was a technically difficult study despite administration of echo contrast     LEFT VENTRICLE:  Systolic function was normal  Ejection fraction was estimated to be 60 %  Although no diagnostic regional wall motion abnormality was identified, this possibility cannot be completely excluded on the basis of this study  Wall thickness was mildly increased  LEFT ATRIUM:  The atrium was dilated  HISTORY: PRIOR HISTORY: HTN, HLD, morbid obesity, CASSIE, PAF, former smoker  PROCEDURE: The procedure was performed at the bedside  This was a routine study  The transthoracic approach was used  The study included complete 2D imaging, M-mode, complete spectral Doppler, and color Doppler  The heart rate was 74 bpm,  at the start of the study  Images were obtained from the parasternal, apical, subcostal, and suprasternal notch acoustic windows  Intravenous contrast ( 1 0ml Definity in NSS) was administered to opacify the left ventricle  Echocardiographic views were limited due to poor acoustic window availability and decreased penetration  This was a technically difficult study despite administration of echo contrast     LEFT VENTRICLE: Size was normal  Systolic function was normal  Ejection fraction was estimated to be 60 %  Although no diagnostic regional wall motion abnormality was identified, this possibility cannot be completely excluded on the basis  of this study  Wall thickness was mildly increased  DOPPLER: The study was not technically sufficient to allow evaluation of LV diastolic function  RIGHT VENTRICLE: Not well visualized  LEFT ATRIUM: The atrium was dilated  Not well visualized  RIGHT ATRIUM: Not well visualized  MITRAL VALVE: Valve structure was normal  There was normal leaflet separation  DOPPLER: The transmitral velocity was within the normal range  There was no evidence for stenosis   There was no significant regurgitation  AORTIC VALVE: The valve was trileaflet  Leaflets exhibited normal thickness and normal cuspal separation  DOPPLER: Transaortic velocity was within the normal range  There was no evidence for stenosis  There was no regurgitation  TRICUSPID VALVE: The valve structure was normal  There was normal leaflet separation  DOPPLER: The transtricuspid velocity was within the normal range  There was no evidence for stenosis  There was trace regurgitation  The tricuspid jet  envelope definition was inadequate for estimation of RV systolic pressure  PULMONIC VALVE: Leaflets exhibited normal thickness, no calcification, and normal cuspal separation  DOPPLER: The transpulmonic velocity was within the normal range  There was trace regurgitation  PERICARDIUM: There was no pericardial effusion  The pericardium was normal in appearance  AORTA: The root exhibited normal size  SYSTEMIC VEINS: IVC: Not assessed  SYSTEM MEASUREMENT TABLES    2D  %FS: 27 26 %  Ao Diam: 3 82 cm  EDV(Teich): 205 8 ml  EF(Teich): 52 02 %  ESV(Teich): 98 74 ml  IVSd: 1 1 cm  LA Diam: 6 04 cm  LVIDd: 6 36 cm  LVIDs: 4 63 cm  LVPWd: 1 1 cm  SV(Teich): 107 06 ml    CW  AV Env  Ti: 262 61 ms  AV MaxP 73 mmHg  AV VTI: 30 33 cm  AV Vmax: 1 85 m/s  AV Vmean: 1 16 m/s  AV meanP 25 mmHg    PW  E' Sept: 0 08 m/s  E/E' Sept: 11 31  LVOT Env  Ti: 293 05 ms  LVOT VTI: 20 29 cm  LVOT Vmax: 0 99 m/s  LVOT Vmean: 0 69 m/s  LVOT maxPG: 3 93 mmHg  LVOT meanP 18 mmHg  MV A Heber: 0 81 m/s  MV Dec Clarion: 4 84 m/s2  MV DecT: 180 45 ms  MV E Heber: 0 87 m/s  MV E/A Ratio: 1 08  MV PHT: 52 33 ms  MVA By PHT: 4 2 cm2  PV Acc Clarion: 8 68 m/s2  PV AccT: 95 15 ms    [employer] Accredited Echocardiography Laboratory    Prepared and electronically signed by    Dinh Sewell DO  Signed 2021 17:34:37       Meds/Allergies   all current active meds have been reviewed and current meds:   Current Facility-Administered Medications   Medication Dose Route Frequency    acetaminophen (TYLENOL) tablet 650 mg  650 mg Oral Q6H PRN    aspirin (ECOTRIN LOW STRENGTH) EC tablet 81 mg  81 mg Oral Daily    atorvastatin (LIPITOR) tablet 20 mg  20 mg Oral HS    calcium carbonate (TUMS) chewable tablet 1,000 mg  1,000 mg Oral Daily PRN    carvedilol (COREG) tablet 6 25 mg  6 25 mg Oral BID With Meals    enoxaparin (LOVENOX) subcutaneous injection 40 mg  40 mg Subcutaneous Daily    furosemide (LASIX) injection 80 mg  80 mg Intravenous TID (diuretic)    lisinopril (ZESTRIL) tablet 10 mg  10 mg Oral Daily    ondansetron (ZOFRAN) injection 4 mg  4 mg Intravenous Q6H PRN    potassium chloride (K-DUR,KLOR-CON) CR tablet 20 mEq  20 mEq Oral TID With Meals    senna (SENOKOT) tablet 8 6 mg  1 tablet Oral Daily     Medications Prior to Admission   Medication    aspirin (ECOTRIN LOW STRENGTH) 81 mg EC tablet    atenolol (TENORMIN) 50 mg tablet    atorvastatin (LIPITOR) 20 mg tablet    cholecalciferol (VITAMIN D3) 25 mcg (1,000 units) tablet    Cyanocobalamin (VITAMIN I-72 PO)    folic acid (FOLVITE) 1 mg tablet    furosemide (LASIX) 20 mg tablet    lisinopril (ZESTRIL) 10 mg tablet     Counseling / Coordination of Care  Total floor / unit time spent today 20 minutes  Greater than 50% of total time was spent with the patient and / or family counseling and / or coordination of care  ** Please Note: Dragon 360 Dictation voice to text software may have been used in the creation of this document   **

## 2021-06-12 LAB
ANION GAP SERPL CALCULATED.3IONS-SCNC: 4 MMOL/L (ref 4–13)
BUN SERPL-MCNC: 27 MG/DL (ref 5–25)
CALCIUM SERPL-MCNC: 8.7 MG/DL (ref 8.3–10.1)
CHLORIDE SERPL-SCNC: 98 MMOL/L (ref 100–108)
CO2 SERPL-SCNC: 32 MMOL/L (ref 21–32)
CREAT SERPL-MCNC: 1.15 MG/DL (ref 0.6–1.3)
GFR SERPL CREATININE-BSD FRML MDRD: 69 ML/MIN/1.73SQ M
GLUCOSE SERPL-MCNC: 121 MG/DL (ref 65–140)
POTASSIUM SERPL-SCNC: 4.2 MMOL/L (ref 3.5–5.3)
SODIUM SERPL-SCNC: 134 MMOL/L (ref 136–145)

## 2021-06-12 PROCEDURE — 99232 SBSQ HOSP IP/OBS MODERATE 35: CPT | Performed by: INTERNAL MEDICINE

## 2021-06-12 PROCEDURE — 80048 BASIC METABOLIC PNL TOTAL CA: CPT | Performed by: INTERNAL MEDICINE

## 2021-06-12 RX ADMIN — POTASSIUM CHLORIDE 20 MEQ: 1500 TABLET, EXTENDED RELEASE ORAL at 16:40

## 2021-06-12 RX ADMIN — ENOXAPARIN SODIUM 40 MG: 40 INJECTION SUBCUTANEOUS at 10:10

## 2021-06-12 RX ADMIN — SENNOSIDES 8.6 MG: 8.6 TABLET ORAL at 10:13

## 2021-06-12 RX ADMIN — CARVEDILOL 6.25 MG: 6.25 TABLET, FILM COATED ORAL at 10:11

## 2021-06-12 RX ADMIN — CARVEDILOL 6.25 MG: 6.25 TABLET, FILM COATED ORAL at 16:40

## 2021-06-12 RX ADMIN — BUMETANIDE 2 MG: 0.25 INJECTION INTRAMUSCULAR; INTRAVENOUS at 11:19

## 2021-06-12 RX ADMIN — CALCIUM CARBONATE (ANTACID) CHEW TAB 500 MG 1000 MG: 500 CHEW TAB at 10:10

## 2021-06-12 RX ADMIN — LISINOPRIL 5 MG: 5 TABLET ORAL at 10:10

## 2021-06-12 RX ADMIN — ASPIRIN 81 MG: 81 TABLET, COATED ORAL at 10:11

## 2021-06-12 RX ADMIN — BUMETANIDE 2 MG: 0.25 INJECTION INTRAMUSCULAR; INTRAVENOUS at 17:31

## 2021-06-12 RX ADMIN — POTASSIUM CHLORIDE 20 MEQ: 1500 TABLET, EXTENDED RELEASE ORAL at 12:46

## 2021-06-12 RX ADMIN — ATORVASTATIN CALCIUM 20 MG: 20 TABLET, FILM COATED ORAL at 21:29

## 2021-06-12 RX ADMIN — POTASSIUM CHLORIDE 20 MEQ: 1500 TABLET, EXTENDED RELEASE ORAL at 10:10

## 2021-06-12 NOTE — ASSESSMENT & PLAN NOTE
Patient presented to outpatient cardiologist 6/7 for routine follow-up with evidence volume overload and suspected heart failure  Referred to the ER by cardiologist   Symptomatically patient reports worsening lower extremity edema and dyspnea exertion x1 month  Minimal improvement with Lasix 20 mg daily  Appreciate input from Cardiology  Status post Lasix  Patient was switched to IV Bumex, 6/11  As per cardiologist, continue with present doses  Continue with lisinopril 5 mg once a day    LVEF on echocardiogram 60%  Cardiology on board  2 g sodium diet, fluid restriction, daily weights, I/O

## 2021-06-12 NOTE — ASSESSMENT & PLAN NOTE
Continue lisinopril, atenolol changed to Coreg  Monitor with diuresis  Lisinopril dose now at 5 mg once a day, due to previous low blood pressures  Brianna David

## 2021-06-12 NOTE — PROGRESS NOTES
Cardiology Progress Note - Mannie Meng 61 y o  male MRN: [id number]    Unit/Bed#: S -01 Encounter: [id number]      Assessment:  1  Acute on chronic diastolic CHF  2  History of atrial flutter s/p ablation   3  Benign essential hypertension   4  Dyslipidemia   5  CASSIE - untreated  6  Morbid obesity - BMI 68     Plan:  1  Remains with adequate diuresis although no intake recorded yesterday, weights down  2  Continue Bumex at current dose as still volume overloaded   3  Blood pressure controlled - continue current regimen  4  Needs treatment of CASSIE   5  Maintaining NSR by exam - off telemetry, on carvedilol     Subjective:   Patient seen and examined  No significant events overnight  Objective:     Vitals: Blood pressure 137/67, pulse 78, temperature 97 5 °F (36 4 °C), temperature source Oral, resp  rate 18, height 5' 6 5" (1 689 m), weight (!) 195 kg (430 lb), SpO2 94 %  , Body mass index is 68 36 kg/m² ,   Orthostatic Blood Pressures      Most Recent Value   Blood Pressure  137/67 filed at 06/12/2021 0700   Patient Position - Orthostatic VS  Sitting filed at 06/12/2021 0700            Intake/Output Summary (Last 24 hours) at 6/12/2021 0848  Last data filed at 6/12/2021 0700  Gross per 24 hour   Intake --   Output 1325 ml   Net -1325 ml           Physical Exam:    GEN: Mannie Meng appears well, alert and oriented x 3, pleasant and cooperative   HEENT: pupils equal, round, and reactive to light; extraocular muscles intact  NECK: supple, no carotid bruits,  Unable to assess JVP secondary to neck size   HEART: regular rhythm, normal S1 and S2, no murmurs, clicks, gallops or rubs   LUNGS: clear to auscultation bilaterally; no wheezes, rales, or rhonchi   ABDOMEN: normal bowel sounds, soft, no tenderness, no distention  EXTREMITIES: + edema, compression stockings in place   NEURO: no focal findings   SKIN: normal without suspicious lesions on exposed skin    Medications:      Current Facility-Administered Medications:     acetaminophen (TYLENOL) tablet 650 mg, 650 mg, Oral, Q6H PRN, Zeeshan Pederson PA-C    aspirin (ECOTRIN LOW STRENGTH) EC tablet 81 mg, 81 mg, Oral, Daily, VINH Tomlinson-PAUL, 81 mg at 06/11/21 0937    atorvastatin (LIPITOR) tablet 20 mg, 20 mg, Oral, HS, Zeeshan Pederson PA-C, 20 mg at 06/11/21 2359    bumetanide (BUMEX) injection 2 mg, 2 mg, Intravenous, TID, Aaron Carter CRNP, 2 mg at 06/11/21 1904    calcium carbonate (TUMS) chewable tablet 1,000 mg, 1,000 mg, Oral, Daily PRN, Zeeshan Pederson PA-C, 1,000 mg at 06/11/21 1259    carvedilol (COREG) tablet 6 25 mg, 6 25 mg, Oral, BID With Meals, ARYAN Sow, 6 25 mg at 06/11/21 1730    enoxaparin (LOVENOX) subcutaneous injection 40 mg, 40 mg, Subcutaneous, Daily, Zeeshan Pederson PA-C, 40 mg at 06/11/21 8610    lisinopril (ZESTRIL) tablet 5 mg, 5 mg, Oral, Daily, Aaron Carter, CRNP    ondansetron (ZOFRAN) injection 4 mg, 4 mg, Intravenous, Q6H PRN, Zeeshan Pederson PA-C    potassium chloride (K-DUR,KLOR-CON) CR tablet 20 mEq, 20 mEq, Oral, TID With Meals, ARYAN Sow, 20 mEq at 06/11/21 1730    senna (SENOKOT) tablet 8 6 mg, 1 tablet, Oral, Daily, Zeeshan Pederson PA-C, 8 6 mg at 06/11/21 0937     Labs & Results:    Results from last 7 days   Lab Units 06/08/21  0536 06/08/21  0134 06/07/21 2004   TROPONIN I ng/mL <0 02 <0 02 <0 02     Results from last 7 days   Lab Units 06/09/21  0447 06/08/21  0536 06/07/21 2004   WBC Thousand/uL 9 93 11 78* 10 83*   HEMOGLOBIN g/dL 12 6 12 5 12 8   HEMATOCRIT % 40 5 40 5 40 2   PLATELETS Thousands/uL 211 229 211     Results from last 7 days   Lab Units 06/08/21  0536   TRIGLYCERIDES mg/dL 210*   HDL mg/dL 37*     Results from last 7 days   Lab Units 06/12/21  0233 06/11/21  0506 06/10/21  0547  06/07/21 2004   POTASSIUM mmol/L 4 2 4 0 4 2   < > 4 1   CHLORIDE mmol/L 98* 99* 100   < > 101   CO2 mmol/L 32 30 32 < > 32   BUN mg/dL 27* 27* 24   < > 20   CREATININE mg/dL 1 15 1 22 1 08   < > 0 97   CALCIUM mg/dL 8 7 8 9 8 8   < > 9 0   ALK PHOS U/L  --   --   --   --  88   ALT U/L  --   --   --   --  17   AST U/L  --   --   --   --  13    < > = values in this interval not displayed  Results from last 7 days   Lab Units 06/08/21  0536   MAGNESIUM mg/dL 2 1         Counseling / Coordination of Care  Total floor / unit time spent today 25  minutes  Greater than 50% of total time was spent with the patient and / or family counseling and / or coordination of care

## 2021-06-13 LAB
ANION GAP SERPL CALCULATED.3IONS-SCNC: 10 MMOL/L (ref 4–13)
BUN SERPL-MCNC: 23 MG/DL (ref 5–25)
CALCIUM SERPL-MCNC: 9.4 MG/DL (ref 8.3–10.1)
CHLORIDE SERPL-SCNC: 96 MMOL/L (ref 100–108)
CO2 SERPL-SCNC: 29 MMOL/L (ref 21–32)
CREAT SERPL-MCNC: 1.09 MG/DL (ref 0.6–1.3)
GFR SERPL CREATININE-BSD FRML MDRD: 73 ML/MIN/1.73SQ M
GLUCOSE SERPL-MCNC: 128 MG/DL (ref 65–140)
POTASSIUM SERPL-SCNC: 4.1 MMOL/L (ref 3.5–5.3)
SODIUM SERPL-SCNC: 135 MMOL/L (ref 136–145)

## 2021-06-13 PROCEDURE — 99232 SBSQ HOSP IP/OBS MODERATE 35: CPT | Performed by: INTERNAL MEDICINE

## 2021-06-13 PROCEDURE — 80048 BASIC METABOLIC PNL TOTAL CA: CPT | Performed by: INTERNAL MEDICINE

## 2021-06-13 RX ORDER — ALBUMIN (HUMAN) 12.5 G/50ML
12.5 SOLUTION INTRAVENOUS ONCE
Status: COMPLETED | OUTPATIENT
Start: 2021-06-13 | End: 2021-06-14

## 2021-06-13 RX ORDER — ALBUMIN (HUMAN) 12.5 G/50ML
25 SOLUTION INTRAVENOUS ONCE
Status: DISCONTINUED | OUTPATIENT
Start: 2021-06-13 | End: 2021-06-13

## 2021-06-13 RX ADMIN — SENNOSIDES 8.6 MG: 8.6 TABLET ORAL at 09:06

## 2021-06-13 RX ADMIN — POTASSIUM CHLORIDE 20 MEQ: 1500 TABLET, EXTENDED RELEASE ORAL at 09:09

## 2021-06-13 RX ADMIN — ENOXAPARIN SODIUM 40 MG: 40 INJECTION SUBCUTANEOUS at 09:05

## 2021-06-13 RX ADMIN — ALBUMIN (HUMAN) 12.5 G: 0.25 INJECTION, SOLUTION INTRAVENOUS at 20:57

## 2021-06-13 RX ADMIN — CARVEDILOL 6.25 MG: 6.25 TABLET, FILM COATED ORAL at 09:08

## 2021-06-13 RX ADMIN — BUMETANIDE 2 MG: 0.25 INJECTION INTRAMUSCULAR; INTRAVENOUS at 13:26

## 2021-06-13 RX ADMIN — BUMETANIDE 2 MG: 0.25 INJECTION INTRAMUSCULAR; INTRAVENOUS at 21:27

## 2021-06-13 RX ADMIN — POTASSIUM CHLORIDE 20 MEQ: 1500 TABLET, EXTENDED RELEASE ORAL at 17:32

## 2021-06-13 RX ADMIN — LISINOPRIL 5 MG: 5 TABLET ORAL at 09:06

## 2021-06-13 RX ADMIN — CARVEDILOL 6.25 MG: 6.25 TABLET, FILM COATED ORAL at 17:32

## 2021-06-13 RX ADMIN — ATORVASTATIN CALCIUM 20 MG: 20 TABLET, FILM COATED ORAL at 20:57

## 2021-06-13 RX ADMIN — ALBUMIN (HUMAN) 12.5 G: 0.25 INJECTION, SOLUTION INTRAVENOUS at 20:13

## 2021-06-13 RX ADMIN — BUMETANIDE 2 MG: 0.25 INJECTION INTRAMUSCULAR; INTRAVENOUS at 09:07

## 2021-06-13 RX ADMIN — ASPIRIN 81 MG: 81 TABLET, COATED ORAL at 09:06

## 2021-06-13 RX ADMIN — POTASSIUM CHLORIDE 20 MEQ: 1500 TABLET, EXTENDED RELEASE ORAL at 13:27

## 2021-06-13 NOTE — PLAN OF CARE
Problem: PAIN - ADULT  Goal: Verbalizes/displays adequate comfort level or baseline comfort level  Description: Interventions:  - Encourage patient to monitor pain and request assistance  - Assess pain using appropriate pain scale  - Administer analgesics based on type and severity of pain and evaluate response  - Implement non-pharmacological measures as appropriate and evaluate response  - Consider cultural and social influences on pain and pain management  - Notify physician/advanced practitioner if interventions unsuccessful or patient reports new pain  Outcome: Progressing     Problem: SAFETY ADULT  Goal: Patient will remain free of falls  Description: INTERVENTIONS:  - Assess patient frequently for physical needs  -  Identify cognitive and physical deficits and behaviors that affect risk of falls    -  [place] fall precautions as indicated by assessment   - Educate patient/family on patient safety including physical limitations  - Instruct patient to call for assistance with activity based on assessment  - Modify environment to reduce risk of injury  - Consider OT/PT consult to assist with strengthening/mobility  Outcome: Progressing  Goal: Maintain or return to baseline ADL function  Description: INTERVENTIONS:  -  Assess patient's ability to carry out ADLs; assess patient's baseline for ADL function and identify physical deficits which impact ability to perform ADLs (bathing, care of mouth/teeth, toileting, grooming, dressing, etc )  - Assess/evaluate cause of self-care deficits   - Assess range of motion  - Assess patient's mobility; develop plan if impaired  - Assess patient's need for assistive devices and provide as appropriate  - Encourage maximum independence but intervene and supervise when necessary  - Involve family in performance of ADLs  - Assess for home care needs following discharge   - Consider OT consult to assist with ADL evaluation and planning for discharge  - Provide patient education as appropriate  Outcome: Progressing  Goal: Maintains/Returns to pre admission functional level  Description: INTERVENTIONS:  - Assess patient's baseline mobility status (ambulation, transfers, stairs, etc )    - Identify cognitive and physical deficits and behaviors that affect mobility  - Identify mobility aids required to assist with transfers and/or ambulation (gait belt, sit-to-stand, lift, walker, cane, etc )  - [place] fall precautions as indicated by assessment  - Record patient progress and toleration of activity level on Mobility SBAR; progress patient to next Phase/Stage  - Instruct patient to call for assistance with activity based on assessment  - Consider rehabilitation consult to assist with strengthening/weightbearing, etc   Outcome: Progressing     Problem: DISCHARGE PLANNING  Goal: Discharge to home or other facility with appropriate resources  Description: INTERVENTIONS:  - Identify barriers to discharge w/patient and caregiver  - Arrange for needed discharge resources and transportation as appropriate  - Identify discharge learning needs (meds, wound care, etc )  - Arrange for interpretive services to assist at discharge as needed  - Refer to Case Management Department for coordinating discharge planning if the patient needs post-hospital services based on physician/advanced practitioner order or complex needs related to functional status, cognitive ability, or social support system  Outcome: Progressing     Problem: Knowledge Deficit  Goal: Patient/family/caregiver demonstrates understanding of disease process, treatment plan, medications, and discharge instructions  Description: Complete learning assessment and assess knowledge base    Interventions:  - Provide teaching at level of understanding  - Provide teaching via preferred learning methods  Outcome: Progressing     Problem: Potential for Falls  Goal: Patient will remain free of falls  Description: INTERVENTIONS:  - Assess patient frequently for physical needs  -  Identify cognitive and physical deficits and behaviors that affect risk of falls    -  [place] fall precautions as indicated by assessment   - Educate patient/family on patient safety including physical limitations  - Instruct patient to call for assistance with activity based on assessment  - Modify environment to reduce risk of injury  - Consider OT/PT consult to assist with strengthening/mobility  Outcome: Progressing     Problem: MOBILITY - ADULT  Goal: Maintain or return to baseline ADL function  Description: INTERVENTIONS:  -  Assess patient's ability to carry out ADLs; assess patient's baseline for ADL function and identify physical deficits which impact ability to perform ADLs (bathing, care of mouth/teeth, toileting, grooming, dressing, etc )  - Assess/evaluate cause of self-care deficits   - Assess range of motion  - Assess patient's mobility; develop plan if impaired  - Assess patient's need for assistive devices and provide as appropriate  - Encourage maximum independence but intervene and supervise when necessary  - Involve family in performance of ADLs  - Assess for home care needs following discharge   - Consider OT consult to assist with ADL evaluation and planning for discharge  - Provide patient education as appropriate  Outcome: Progressing  Goal: Maintains/Returns to pre admission functional level  Description: INTERVENTIONS:  - Assess patient's baseline mobility status (ambulation, transfers, stairs, etc )    - Identify cognitive and physical deficits and behaviors that affect mobility  - Identify mobility aids required to assist with transfers and/or ambulation (gait belt, sit-to-stand, lift, walker, cane, etc )  - [place] fall precautions as indicated by assessment  - Record patient progress and toleration of activity level on Mobility SBAR; progress patient to next Phase/Stage  - Instruct patient to call for assistance with activity based on assessment  - Consider rehabilitation consult to assist with strengthening/weightbearing, etc   Outcome: Progressing     Problem: CARDIOVASCULAR - ADULT  Goal: Maintains optimal cardiac output and hemodynamic stability  Description: INTERVENTIONS:  - Monitor I/O, vital signs and rhythm  - Monitor for S/S and trends of decreased cardiac output  - Administer and titrate ordered vasoactive medications to optimize hemodynamic stability  - Assess quality of pulses, skin color and temperature  - Assess for signs of decreased coronary artery perfusion  - Instruct patient to report change in severity of symptoms  Outcome: Progressing  Goal: Absence of cardiac dysrhythmias or at baseline rhythm  Description: INTERVENTIONS:  - Continuous cardiac monitoring, vital signs, obtain 12 lead EKG if ordered  - Administer antiarrhythmic and heart rate control medications as ordered  - Monitor electrolytes and administer replacement therapy as ordered  Outcome: Progressing     Problem: RESPIRATORY - ADULT  Goal: Achieves optimal ventilation and oxygenation  Description: INTERVENTIONS:  - Assess for changes in respiratory status  - Assess for changes in mentation and behavior  - Position to facilitate oxygenation and minimize respiratory effort  - Oxygen administered by appropriate delivery if ordered  - Initiate smoking cessation education as indicated  - Encourage broncho-pulmonary hygiene including cough, deep breathe, Incentive Spirometry  - Assess the need for suctioning and aspirate as needed  - Assess and instruct to report SOB or any respiratory difficulty  - Respiratory Therapy support as indicated  Outcome: Progressing     Problem: METABOLIC, FLUID AND ELECTROLYTES - ADULT  Goal: Electrolytes maintained within normal limits  Description: INTERVENTIONS:  - Monitor labs and assess patient for signs and symptoms of electrolyte imbalances  - Administer electrolyte replacement as ordered  - Monitor response to electrolyte replacements, including repeat lab results as appropriate  - Instruct patient on fluid and nutrition as appropriate  Outcome: Progressing  Goal: Fluid balance maintained  Description: INTERVENTIONS:  - Monitor labs   - Monitor I/O and WT  - Instruct patient on fluid and nutrition as appropriate  - Assess for signs & symptoms of volume excess or deficit  Outcome: Progressing

## 2021-06-13 NOTE — PROGRESS NOTES
[facility]  Progress Note - Emanuel Lopez 1960, 61 y o  male MRN: [id number]  Unit/Bed#: S -01 Encounter: [id number]  Primary Care Provider: Quirino Nguyen MD   Date and time admitted to hospital: 6/7/2021  7:27 PM    * Acute diastolic heart failure [facility])  Assessment & Plan  Patient presented to outpatient cardiologist 6/7 for routine follow-up with evidence volume overload and suspected heart failure  Referred to the ER by cardiologist   Symptomatically patient reports worsening lower extremity edema and dyspnea exertion x1 month  Minimal improvement with Lasix 20 mg daily  Appreciate input from Cardiology  Status post Lasix  Patient was switched to IV Bumex, 6/11  As per cardiologist, continue with present doses  Continue with lisinopril 5 mg once a day  LVEF on echocardiogram 60%  Cardiology on board  2 g sodium diet, fluid restriction, daily weights, I/O    Paroxysmal atrial fibrillation [facility])  Assessment & Plan  Patient had EKG that showed slow atrial fibrillation however per Cardiology appears to be sinus rhythm  History of atrial flutter with prior flutter ablation  On telemetry, appears normal sinus - this was d/c   Not on anticoagulation, continue aspirin  Atenolol changed to Coreg    Venous stasis ulcers of both lower extremities (HCC)  Assessment & Plan  · On Real stockings  CASSIE (obstructive sleep apnea)  Assessment & Plan  Noncompliant with CPAP therapy, states he was unable to tolerate it    Morbid obesity (HCC)  Assessment & Plan  Recommend diet, weight loss, lifestyle modifications  BMI greater than 70  follows outpatient bariatric    Mixed hyperlipidemia  Assessment & Plan  Continue Lipitor  LDL 60    Essential hypertension  Assessment & Plan  Continue lisinopril, atenolol changed to Coreg  Monitor with diuresis  Lisinopril dose now at 5 mg once a day, due to previous low blood pressures  Dara Soulier       VTE Pharmacologic Prophylaxis:   Pharmacologic: Enoxaparin (Lovenox)  Mechanical VTE Prophylaxis in Place: Yes    Patient Centered Rounds: I have performed bedside rounds with nursing staff today  Discussions with Specialists or Other Care Team Provider:    Cardiologist     Education and Discussions with Family / Patient:  I discussed our findings, management and plans to the patient and patient's wife on the phone  No questions asked  They are okay with the management and plans    Time Spent for Care: 30 minutes  More than 50% of total time spent on counseling and coordination of care as described above  Current Length of Stay: 6 day(s)    Current Patient Status: Inpatient   Certification Statement: The patient will continue to require additional inpatient hospital stay due to Above findings and plans  Discharge Plan:  None yet  Code Status: Level 1 - Full Code      Subjective:   Patient is doing fine and feels a lot better  In fact, patient denies any symptoms and does not have any complaints  Patient denies any shortness of breath  As per patient, his lower extremity swelling has been coming down  Objective:     Vitals:   Temp (24hrs), Av °F (36 7 °C), Min:97 6 °F (36 4 °C), Max:98 7 °F (37 1 °C)    Temp:  [97 6 °F (36 4 °C)-98 7 °F (37 1 °C)] 97 6 °F (36 4 °C)  HR:  [74-83] 74  Resp:  [15-18] 15  BP: ()/(57-75) 99/57  SpO2:  [90 %-93 %] 93 %  Body mass index is 67 98 kg/m²  Input and Output Summary (last 24 hours): Intake/Output Summary (Last 24 hours) at 2021 1441  Last data filed at 2021 1328  Gross per 24 hour   Intake 480 ml   Output 2675 ml   Net -2195 ml       Physical Exam:     Physical Exam  Vitals and nursing note reviewed  Constitutional:       General: He is not in acute distress  Appearance: He is obese  He is not ill-appearing, toxic-appearing or diaphoretic  Cardiovascular:      Rate and Rhythm: Normal rate and regular rhythm  Heart sounds: Normal heart sounds     Pulmonary: Effort: Pulmonary effort is normal  No respiratory distress  Breath sounds: Normal breath sounds  Abdominal:      General: Bowel sounds are normal  There is no distension  Palpations: Abdomen is soft  Tenderness: There is no abdominal tenderness  Musculoskeletal:      Right lower leg: Edema present  Left lower leg: Edema present  Skin:     General: Skin is warm  Coloration: Skin is not pale  Findings: No erythema or rash  Neurological:      General: No focal deficit present  Mental Status: He is alert  Psychiatric:         Mood and Affect: Mood normal          Behavior: Behavior normal          Thought Content: Thought content normal              Additional Data:     Labs:    Results from last 7 days   Lab Units 06/09/21  0447 06/07/21 2004   WBC Thousand/uL 9 93 10 83*   HEMOGLOBIN g/dL 12 6 12 8   HEMATOCRIT % 40 5 40 2   PLATELETS Thousands/uL 211 211   NEUTROS PCT %  --  67   LYMPHS PCT %  --  20   MONOS PCT %  --  6   EOS PCT %  --  5     Results from last 7 days   Lab Units 06/13/21  0608 06/07/21 2004   POTASSIUM mmol/L 4 1 4 1   CHLORIDE mmol/L 96* 101   CO2 mmol/L 29 32   BUN mg/dL 23 20   CREATININE mg/dL 1 09 0 97   CALCIUM mg/dL 9 4 9 0   ALK PHOS U/L  --  88   ALT U/L  --  17   AST U/L  --  13           * I Have Reviewed All Lab Data Listed Above  * Additional Pertinent Lab Tests Reviewed: Penny 66 Admission Reviewed    Imaging:    Imaging Reports Reviewed Today Include:  Diagnostic imaging studies that were done on this admission  Imaging Personally Reviewed by Myself Includes:  None      Recent Cultures (last 7 days):           Last 24 Hours Medication List:   Current Facility-Administered Medications   Medication Dose Route Frequency Provider Last Rate    acetaminophen  650 mg Oral Q6H PRN Zeeshan Pederson PA-C      aspirin  81 mg Oral Daily Zeeshan Pederson PA-C      atorvastatin  20 mg Oral HS Zeeshan Pederson CONNIE      bumetanide  2 mg Intravenous TID ARYAN Miguel      calcium carbonate  1,000 mg Oral Daily PRN Lily Pederson PA-C      carvedilol  6 25 mg Oral BID With Meals ARYAN Braswell      enoxaparin  40 mg Subcutaneous Daily Zeeshan Pederson PA-C      lisinopril  5 mg Oral Daily ARYAN Miguel      ondansetron  4 mg Intravenous Q6H PRN Zeeshan Pederson PA-C      potassium chloride  20 mEq Oral TID With Meals ARYAN Braswell      senna  1 tablet Oral Daily Zeeshan Pederson PA-C          Today, Patient Was Seen By: Buster Smith MD    ** Please Note: Dragon 360 Dictation voice to text software may have been used in the creation of this document   **

## 2021-06-13 NOTE — PROGRESS NOTES
Cardiology Progress Note - Karen Fraser 61 y o  male MRN: [id number]    Unit/Bed#: S -01 Encounter: [id number]      Assessment:  1  Acute on chronic diastolic CHF  2  History of atrial flutter s/p ablation   3  Benign essential hypertension   4  Dyslipidemia   5  CASSIE - untreated  6  Morbid obesity - BMI 68     Plan:  1  Remains with adequate diuresis, weight down - unknown dry weight (425 lb at 10//2020 office visit)  2  Continue Bumex at current dose as still volume overloaded  3  Blood pressure controlled - continue current regimen  4  Needs treatment of CASSIE   5  Maintaining NSR by exam - off telemetry, on carvedilol   6  Attempted to update wife via telephone, no answer and no voicemail     Subjective:   Patient seen and examined  No significant events overnight  Objective:     Vitals: Blood pressure 99/57, pulse 74, temperature 97 6 °F (36 4 °C), temperature source Oral, resp  rate 15, height 5' 6 5" (1 689 m), weight (!) 194 kg (427 lb 9 6 oz), SpO2 93 %  , Body mass index is 67 98 kg/m² ,   Orthostatic Blood Pressures      Most Recent Value   Blood Pressure  99/57 filed at 06/13/2021 5016   Patient Position - Orthostatic VS  Sitting filed at 06/13/2021 2348            Intake/Output Summary (Last 24 hours) at 6/13/2021 0848  Last data filed at 6/13/2021 0636  Gross per 24 hour   Intake 480 ml   Output 2475 ml   Net -1995 ml           Physical Exam:    GEN: Karen Fraser appears well, alert and oriented x 3, pleasant and cooperative   HEENT: pupils equal, round, and reactive to light; extraocular muscles intact  NECK: supple, no carotid bruits,  Unable to assess JVP secondary to neck size   HEART: regular rhythm, normal S1 and S2, no murmur  LUNGS: clear to auscultation bilaterally; no wheezes, rales, or rhonchi   ABDOMEN: normal bowel sounds, soft, no tenderness, no distention  EXTREMITIES: + edema, compression stockings in place   NEURO: no focal findings   SKIN: normal without suspicious lesions on exposed skin    Medications:      Current Facility-Administered Medications:     acetaminophen (TYLENOL) tablet 650 mg, 650 mg, Oral, Q6H PRN, Zeeshan Pederson PA-C    aspirin (ECOTRIN LOW STRENGTH) EC tablet 81 mg, 81 mg, Oral, Daily, Zeeshan Pederson PA-C, 81 mg at 06/12/21 1011    atorvastatin (LIPITOR) tablet 20 mg, 20 mg, Oral, HS, Zeeshan Pederson PA-C, 20 mg at 06/12/21 2129    bumetanide (BUMEX) injection 2 mg, 2 mg, Intravenous, TID, ARYAN Meza, 2 mg at 06/12/21 1731    calcium carbonate (TUMS) chewable tablet 1,000 mg, 1,000 mg, Oral, Daily PRN, Zeeshan Pederson PA-C, 1,000 mg at 06/12/21 1010    carvedilol (COREG) tablet 6 25 mg, 6 25 mg, Oral, BID With Meals, ARYAN Torres, 6 25 mg at 06/12/21 1640    enoxaparin (LOVENOX) subcutaneous injection 40 mg, 40 mg, Subcutaneous, Daily, Zeeshan Pederson PA-C, 40 mg at 06/12/21 1010    lisinopril (ZESTRIL) tablet 5 mg, 5 mg, Oral, Daily, ARYAN Meza, 5 mg at 06/12/21 1010    ondansetron (ZOFRAN) injection 4 mg, 4 mg, Intravenous, Q6H PRN, Zeeshan Pederson PA-C    potassium chloride (K-DUR,KLOR-CON) CR tablet 20 mEq, 20 mEq, Oral, TID With Meals, ARYAN Torres, 20 mEq at 06/12/21 1640    senna (SENOKOT) tablet 8 6 mg, 1 tablet, Oral, Daily, Zeeshan Pederson PA-C, 8 6 mg at 06/12/21 1013     Labs & Results:    Results from last 7 days   Lab Units 06/08/21  0536 06/08/21  0134 06/07/21 2004   TROPONIN I ng/mL <0 02 <0 02 <0 02     Results from last 7 days   Lab Units 06/09/21  0447 06/08/21  0536 06/07/21 2004   WBC Thousand/uL 9 93 11 78* 10 83*   HEMOGLOBIN g/dL 12 6 12 5 12 8   HEMATOCRIT % 40 5 40 5 40 2   PLATELETS Thousands/uL 211 229 211     Results from last 7 days   Lab Units 06/08/21  0536   TRIGLYCERIDES mg/dL 210*   HDL mg/dL 37*     Results from last 7 days   Lab Units 06/13/21  0608 06/12/21  0233 06/11/21  0506 06/07/21 2004   POTASSIUM mmol/L 4 1 4 2 4 0 4 1   CHLORIDE mmol/L 96* 98* 99* 101   CO2 mmol/L 29 32 30 32   BUN mg/dL 23 27* 27* 20   CREATININE mg/dL 1 09 1 15 1 22 0 97   CALCIUM mg/dL 9 4 8 7 8 9 9 0   ALK PHOS U/L  --   --   --  88   ALT U/L  --   --   --  17   AST U/L  --   --   --  13         Results from last 7 days   Lab Units 06/08/21  0536   MAGNESIUM mg/dL 2 1         Counseling / Coordination of Care  Total floor / unit time spent today 25  minutes  Greater than 50% of total time was spent with the patient and / or family counseling and / or coordination of care

## 2021-06-13 NOTE — PLAN OF CARE
Problem: PAIN - ADULT  Goal: Verbalizes/displays adequate comfort level or baseline comfort level  Description: Interventions:  - Encourage patient to monitor pain and request assistance  - Assess pain using appropriate pain scale  - Administer analgesics based on type and severity of pain and evaluate response  - Implement non-pharmacological measures as appropriate and evaluate response  - Consider cultural and social influences on pain and pain management  - Notify physician/advanced practitioner if interventions unsuccessful or patient reports new pain  Outcome: Progressing     Problem: INFECTION - ADULT  Goal: Absence or prevention of progression during hospitalization  Description: INTERVENTIONS:  - Assess and monitor for signs and symptoms of infection  - Monitor lab/diagnostic results  - Monitor all insertion sites, i e  indwelling lines, tubes, and drains  - Monitor endotracheal if appropriate and nasal secretions for changes in amount and color  - [place] appropriate cooling/warming therapies per order  - Administer medications as ordered  - Instruct and encourage patient and family to use good hand hygiene technique  - Identify and instruct in appropriate isolation precautions for identified infection/condition  Outcome: Progressing  Goal: Absence of fever/infection during neutropenic period  Description: INTERVENTIONS:  - Monitor WBC    Outcome: Progressing     Problem: SAFETY ADULT  Goal: Patient will remain free of falls  Description: INTERVENTIONS:  - Assess patient frequently for physical needs  -  Identify cognitive and physical deficits and behaviors that affect risk of falls    -  [place] fall precautions as indicated by assessment   - Educate patient/family on patient safety including physical limitations  - Instruct patient to call for assistance with activity based on assessment  - Modify environment to reduce risk of injury  - Consider OT/PT consult to assist with strengthening/mobility  Outcome: Progressing  Goal: Maintain or return to baseline ADL function  Description: INTERVENTIONS:  -  Assess patient's ability to carry out ADLs; assess patient's baseline for ADL function and identify physical deficits which impact ability to perform ADLs (bathing, care of mouth/teeth, toileting, grooming, dressing, etc )  - Assess/evaluate cause of self-care deficits   - Assess range of motion  - Assess patient's mobility; develop plan if impaired  - Assess patient's need for assistive devices and provide as appropriate  - Encourage maximum independence but intervene and supervise when necessary  - Involve family in performance of ADLs  - Assess for home care needs following discharge   - Consider OT consult to assist with ADL evaluation and planning for discharge  - Provide patient education as appropriate  Outcome: Progressing  Goal: Maintain or return mobility status to optimal level  Description: INTERVENTIONS:  - Assess patient's baseline mobility status (ambulation, transfers, stairs, etc )    - Identify cognitive and physical deficits and behaviors that affect mobility  - Identify mobility aids required to assist with transfers and/or ambulation (gait belt, sit-to-stand, lift, walker, cane, etc )  - [place] fall precautions as indicated by assessment  - Record patient progress and toleration of activity level on Mobility SBAR; progress patient to next Phase/Stage  - Instruct patient to call for assistance with activity based on assessment  - Consider rehabilitation consult to assist with strengthening/weightbearing, etc   Outcome: Progressing     Problem: DISCHARGE PLANNING  Goal: Discharge to home or other facility with appropriate resources  Description: INTERVENTIONS:  - Identify barriers to discharge w/patient and caregiver  - Arrange for needed discharge resources and transportation as appropriate  - Identify discharge learning needs (meds, wound care, etc )  - Arrange for interpretive services to assist at discharge as needed  - Refer to Case Management Department for coordinating discharge planning if the patient needs post-hospital services based on physician/advanced practitioner order or complex needs related to functional status, cognitive ability, or social support system  Outcome: Progressing     Problem: Knowledge Deficit  Goal: Patient/family/caregiver demonstrates understanding of disease process, treatment plan, medications, and discharge instructions  Description: Complete learning assessment and assess knowledge base  Interventions:  - Provide teaching at level of understanding  - Provide teaching via preferred learning methods  Outcome: Progressing     Problem: Potential for Falls  Goal: Patient will remain free of falls  Description: INTERVENTIONS:  - Assess patient frequently for physical needs  -  Identify cognitive and physical deficits and behaviors that affect risk of falls    -  [place] fall precautions as indicated by assessment   - Educate patient/family on patient safety including physical limitations  - Instruct patient to call for assistance with activity based on assessment  - Modify environment to reduce risk of injury  - Consider OT/PT consult to assist with strengthening/mobility  Outcome: Progressing

## 2021-06-13 NOTE — ASSESSMENT & PLAN NOTE
Continue lisinopril, atenolol changed to Coreg  Monitor with diuresis  Lisinopril dose now at 5 mg once a day, due to previous low blood pressures  Tavon Irizarry

## 2021-06-14 PROBLEM — N17.9 AKI (ACUTE KIDNEY INJURY) (HCC): Status: ACTIVE | Noted: 2021-06-14

## 2021-06-14 LAB
ALBUMIN SERPL BCP-MCNC: 3.8 G/DL (ref 3.5–5)
ALP SERPL-CCNC: 92 U/L (ref 46–116)
ALT SERPL W P-5'-P-CCNC: 26 U/L (ref 12–78)
ANION GAP SERPL CALCULATED.3IONS-SCNC: 11 MMOL/L (ref 4–13)
AST SERPL W P-5'-P-CCNC: 14 U/L (ref 5–45)
BILIRUB SERPL-MCNC: 0.99 MG/DL (ref 0.2–1)
BUN SERPL-MCNC: 32 MG/DL (ref 5–25)
CALCIUM SERPL-MCNC: 9.1 MG/DL (ref 8.3–10.1)
CHLORIDE SERPL-SCNC: 96 MMOL/L (ref 100–108)
CO2 SERPL-SCNC: 29 MMOL/L (ref 21–32)
CREAT SERPL-MCNC: 1.7 MG/DL (ref 0.6–1.3)
GFR SERPL CREATININE-BSD FRML MDRD: 43 ML/MIN/1.73SQ M
GLUCOSE SERPL-MCNC: 125 MG/DL (ref 65–140)
POTASSIUM SERPL-SCNC: 4.2 MMOL/L (ref 3.5–5.3)
PROT SERPL-MCNC: 8.3 G/DL (ref 6.4–8.2)
SODIUM SERPL-SCNC: 136 MMOL/L (ref 136–145)

## 2021-06-14 PROCEDURE — 99232 SBSQ HOSP IP/OBS MODERATE 35: CPT | Performed by: INTERNAL MEDICINE

## 2021-06-14 PROCEDURE — 80053 COMPREHEN METABOLIC PANEL: CPT | Performed by: INTERNAL MEDICINE

## 2021-06-14 RX ADMIN — ATORVASTATIN CALCIUM 20 MG: 20 TABLET, FILM COATED ORAL at 21:48

## 2021-06-14 RX ADMIN — POTASSIUM CHLORIDE 20 MEQ: 1500 TABLET, EXTENDED RELEASE ORAL at 12:24

## 2021-06-14 RX ADMIN — ASPIRIN 81 MG: 81 TABLET, COATED ORAL at 08:56

## 2021-06-14 RX ADMIN — METOPROLOL TARTRATE 25 MG: 25 TABLET, FILM COATED ORAL at 21:49

## 2021-06-14 RX ADMIN — ENOXAPARIN SODIUM 40 MG: 40 INJECTION SUBCUTANEOUS at 08:55

## 2021-06-14 RX ADMIN — POTASSIUM CHLORIDE 20 MEQ: 1500 TABLET, EXTENDED RELEASE ORAL at 08:57

## 2021-06-14 NOTE — QUICK NOTE
Was informed by the nurse, the patient having poor urine output and blood pressure was low with a recent systolic blood pressure in the 80s  I went to see the patient again, and is doing fine and completely asymptomatic  I will give a dose of IV albumin and since patient is still volume overloaded, once the blood pressure goes up to normal levels with the IV albumin, I instructed the nurse to still give the IV Bumex scheduled for tonight  This was discussed with the patient and he is okay with this plan  According to the patient's nurse, bladder scan only revealed 50 cc of urine in the bladder  Possibility of placing a [place] catheter was discussed with the patient, however patient refused

## 2021-06-14 NOTE — PROGRESS NOTES
General Cardiology   Progress Note -  [facility]   Abeldickguerline Orozco 61 y o  male MRN: [id number]    Unit/Bed#: S -01 Encounter: [id number]    Assessment/ Plan    1  Acute diastolic heart failure  On bumex 2 mg IV TID but held today due to low BP and elevated creatinine   Patient takes furosemide 20 mg PO daily at home  Monitor I/Os - 515 ml in 24 hours and - 9 1 L since admission   Creatine 1 7 today from 1 0 yesterday  Check BMP in am   Daily weights: 427 lbs today from 443 lbs on admission    On 2 gram Na diet and 1500 ml fluid restriction   Reviewed HF education with patient     2  History of atrial flutter ablation   On coreg 6 25 mg PO BID (held today due borderline BP)   Not on [address]    BB changed to metoprolol 25 mg PO BID due to borderline BP      3  Hypertension 100/56 average BP   Lisinopril and coreg held today due to borderline BP    Will change coreg to metoprolol due to borderline BP   Continue to monitor      4  Morbid obesity counseled on lifestyle modifications      5  Hyperlipidemia LDL 60  Continue statin      6  Untreated CASSIE   Follow up as outpatient     Subjective  Patient resting comfortable with wife at bedside  No complaint of SOB, chest pain or palpitations  No fever or chills  Review of Systems   Constitutional: Negative for chills, fever and malaise/fatigue  HENT: Negative for congestion  Cardiovascular: Positive for leg swelling  Negative for chest pain, dyspnea on exertion, orthopnea and palpitations  Respiratory: Negative for cough and shortness of breath  Musculoskeletal: Negative for falls  Gastrointestinal: Negative for bloating, nausea and vomiting  Neurological: Negative for dizziness and light-headedness  Psychiatric/Behavioral: Negative for altered mental status  All other systems reviewed and are negative  Objective:   Vitals: Blood pressure 112/64, pulse 75, temperature 97 7 °F (36 5 °C), temperature source Oral, resp   rate 18, height 5' 6 5" (2 689 m), weight (!) 194 kg (427 lb), SpO2 96 %  ,       Body mass index is 67 89 kg/m²  ,     Systolic (16RIE), YVA:565 , Min:76 , XQE:147     Diastolic (30GQQ), WYU:68, Min:48, Max:64      Intake/Output Summary (Last 24 hours) at 6/14/2021 1508  Last data filed at 6/14/2021 1226  Gross per 24 hour   Intake 1200 ml   Output 725 ml   Net 475 ml     Weight (last 2 days)     Date/Time   Weight    06/14/21 0545   (!) 194 (427)    06/13/21 0600   (!) 194 (427 6)    06/12/21 0532   (!) 195 (430)              Physical Exam  Constitutional:       General: He is not in acute distress  Appearance: He is obese  HENT:      Head: Normocephalic  Mouth/Throat:      Mouth: Mucous membranes are moist    Cardiovascular:      Rate and Rhythm: Normal rate and regular rhythm  Pulses: Normal pulses  Heart sounds: No murmur heard  Pulmonary:      Effort: Pulmonary effort is normal  No respiratory distress  Breath sounds: Normal breath sounds  Comments: RA  Abdominal:      General: Bowel sounds are normal       Palpations: Abdomen is soft  Musculoskeletal:         General: Swelling present  Normal range of motion  Cervical back: Neck supple  Comments: + 1 edema bilateral LE    Skin:     General: Skin is warm and dry  Capillary Refill: Capillary refill takes less than 2 seconds  Neurological:      General: No focal deficit present  Mental Status: He is alert and oriented to person, place, and time     Psychiatric:         Mood and Affect: Mood normal          LABORATORY RESULTS  Results from last 7 days   Lab Units 06/08/21  0536 06/08/21  0134 06/07/21 2004   TROPONIN I ng/mL <0 02 <0 02 <0 02     CBC with diff:   Results from last 7 days   Lab Units 06/09/21  0447 06/08/21  0536 06/07/21  2004   WBC Thousand/uL 9 93 11 78* 10 83*   HEMOGLOBIN g/dL 12 6 12 5 12 8   HEMATOCRIT % 40 5 40 5 40 2   MCV fL 100* 100* 101*   PLATELETS Thousands/uL 211 229 211   MCH pg 31 0 30 9 32 1   MCHC g/dL 31 1* 30 9* 31 8   RDW % 13 0 13 1 13 0   MPV fL 10 4 10 4 10 0   NRBC AUTO /100 WBCs  --   --  0       CMP:  Results from last 7 days   Lab Units 06/14/21  0548 06/13/21  0608 06/12/21  0233 06/11/21  0506 06/10/21  0547 06/09/21  0447 06/08/21  0536 06/07/21 2004   POTASSIUM mmol/L 4 2 4 1 4 2 4 0 4 2 3 8 4 0 4 1   CHLORIDE mmol/L 96* 96* 98* 99* 100 101 102 101   CO2 mmol/L 29 29 32 30 32 32 29 32   BUN mg/dL 32* 23 27* 27* 24 19 19 20   CREATININE mg/dL 1 70* 1 09 1 15 1 22 1 08 0 94 0 94 0 97   CALCIUM mg/dL 9 1 9 4 8 7 8 9 8 8 9 0 8 9 9 0   AST U/L 14  --   --   --   --   --   --  13   ALT U/L 26  --   --   --   --   --   --  17   ALK PHOS U/L 92  --   --   --   --   --   --  88   EGFR ml/min/1 73sq m 43 73 69 64 74 88 88 84       BMP:  Results from last 7 days   Lab Units 06/14/21  0548 06/13/21  0608 06/12/21  0233 06/11/21  0506 06/10/21  0547 06/09/21  0447 06/08/21  0536   POTASSIUM mmol/L 4 2 4 1 4 2 4 0 4 2 3 8 4 0   CHLORIDE mmol/L 96* 96* 98* 99* 100 101 102   CO2 mmol/L 29 29 32 30 32 32 29   BUN mg/dL 32* 23 27* 27* 24 19 19   CREATININE mg/dL 1 70* 1 09 1 15 1 22 1 08 0 94 0 94   CALCIUM mg/dL 9 1 9 4 8 7 8 9 8 8 9 0 8 9       Lab Results   Component Value Date    NTBNP 425 (H) 06/07/2021    NTBNP 975 (H) 05/30/2021             Results from last 7 days   Lab Units 06/08/21  0536   MAGNESIUM mg/dL 2 1                           Lipid Profile:   No results found for: CHOL  Lab Results   Component Value Date    HDL 37 (L) 06/08/2021     Lab Results   Component Value Date    LDLCALC 60 06/08/2021     Lab Results   Component Value Date    TRIG 210 (H) 06/08/2021       Cardiac testing:   Results for orders placed during the hospital encounter of 06/07/21    Echo complete with contrast if indicated    Narrative  [facility], [address]  [phone]    Transthoracic Echocardiogram  2D, M-mode, Doppler, and Color Doppler    Study date:  08-Jun-2021    Patient: Kristyn Bello TURNER  MR number: [id number]  Account number: [de-identified]  : 1960  Age: 61 years  Gender: Male  Status: Inpatient  Location: Bedside  Height: 66 in  Weight: 442 lb  BP: 106/ 58 mmHg    Indications: Heart failure  Diagnoses: I50 9 - Heart failure, unspecified    Sonographer:  DONELL Parker  Primary Physician:  Alonzo Severs, MD  Referring Physician:  Jere Ca MD  Group:  [facility]  Interpreting Physician:  DO CARIE Vivas    PROCEDURE INFORMATION:  This was a technically difficult study despite administration of echo contrast     LEFT VENTRICLE:  Systolic function was normal  Ejection fraction was estimated to be 60 %  Although no diagnostic regional wall motion abnormality was identified, this possibility cannot be completely excluded on the basis of this study  Wall thickness was mildly increased  LEFT ATRIUM:  The atrium was dilated  HISTORY: PRIOR HISTORY: HTN, HLD, morbid obesity, CASSIE, PAF, former smoker  PROCEDURE: The procedure was performed at the bedside  This was a routine study  The transthoracic approach was used  The study included complete 2D imaging, M-mode, complete spectral Doppler, and color Doppler  The heart rate was 74 bpm,  at the start of the study  Images were obtained from the parasternal, apical, subcostal, and suprasternal notch acoustic windows  Intravenous contrast ( 1 0ml Definity in NSS) was administered to opacify the left ventricle  Echocardiographic views were limited due to poor acoustic window availability and decreased penetration  This was a technically difficult study despite administration of echo contrast     LEFT VENTRICLE: Size was normal  Systolic function was normal  Ejection fraction was estimated to be 60 %  Although no diagnostic regional wall motion abnormality was identified, this possibility cannot be completely excluded on the basis  of this study  Wall thickness was mildly increased   DOPPLER: The study was not technically sufficient to allow evaluation of LV diastolic function  RIGHT VENTRICLE: Not well visualized  LEFT ATRIUM: The atrium was dilated  Not well visualized  RIGHT ATRIUM: Not well visualized  MITRAL VALVE: Valve structure was normal  There was normal leaflet separation  DOPPLER: The transmitral velocity was within the normal range  There was no evidence for stenosis  There was no significant regurgitation  AORTIC VALVE: The valve was trileaflet  Leaflets exhibited normal thickness and normal cuspal separation  DOPPLER: Transaortic velocity was within the normal range  There was no evidence for stenosis  There was no regurgitation  TRICUSPID VALVE: The valve structure was normal  There was normal leaflet separation  DOPPLER: The transtricuspid velocity was within the normal range  There was no evidence for stenosis  There was trace regurgitation  The tricuspid jet  envelope definition was inadequate for estimation of RV systolic pressure  PULMONIC VALVE: Leaflets exhibited normal thickness, no calcification, and normal cuspal separation  DOPPLER: The transpulmonic velocity was within the normal range  There was trace regurgitation  PERICARDIUM: There was no pericardial effusion  The pericardium was normal in appearance  AORTA: The root exhibited normal size  SYSTEMIC VEINS: IVC: Not assessed  SYSTEM MEASUREMENT TABLES    2D  %FS: 27 26 %  Ao Diam: 3 82 cm  EDV(Teich): 205 8 ml  EF(Teich): 52 02 %  ESV(Teich): 98 74 ml  IVSd: 1 1 cm  LA Diam: 6 04 cm  LVIDd: 6 36 cm  LVIDs: 4 63 cm  LVPWd: 1 1 cm  SV(Teich): 107 06 ml    CW  AV Env  Ti: 262 61 ms  AV MaxP 73 mmHg  AV VTI: 30 33 cm  AV Vmax: 1 85 m/s  AV Vmean: 1 16 m/s  AV meanP 25 mmHg    PW  E' Sept: 0 08 m/s  E/E' Sept: 11 31  LVOT Env  Ti: 293 05 ms  LVOT VTI: 20 29 cm  LVOT Vmax: 0 99 m/s  LVOT Vmean: 0 69 m/s  LVOT maxPG: 3 93 mmHg  LVOT meanP 18 mmHg  MV A [place]: 0 81 m/s  MV Dec [place]: 4 84 m/s2  MV DecT: 180 45 ms  MV E [place]: 0 87 m/s  MV E/A Ratio: 1 08  MV PHT: 52 33 ms  MVA By PHT: 4 2 cm2  PV Acc [place]: 8 68 m/s2  PV AccT: 95 15 ms    [employer] Accredited Echocardiography Laboratory    Prepared and electronically signed by    Royce Garzon DO  Signed 08-Jun-2021 17:34:37    No results found for this or any previous visit  No results found for this or any previous visit  No valid procedures specified  No results found for this or any previous visit  Meds/Allergies   all current active meds have been reviewed and current meds:   Current Facility-Administered Medications   Medication Dose Route Frequency    acetaminophen (TYLENOL) tablet 650 mg  650 mg Oral Q6H PRN    aspirin (ECOTRIN LOW STRENGTH) EC tablet 81 mg  81 mg Oral Daily    atorvastatin (LIPITOR) tablet 20 mg  20 mg Oral HS    bumetanide (BUMEX) injection 2 mg  2 mg Intravenous TID    calcium carbonate (TUMS) chewable tablet 1,000 mg  1,000 mg Oral Daily PRN    carvedilol (COREG) tablet 6 25 mg  6 25 mg Oral BID With Meals    enoxaparin (LOVENOX) subcutaneous injection 40 mg  40 mg Subcutaneous Daily    lisinopril (ZESTRIL) tablet 5 mg  5 mg Oral Daily    ondansetron (ZOFRAN) injection 4 mg  4 mg Intravenous Q6H PRN    potassium chloride (K-DUR,KLOR-CON) CR tablet 20 mEq  20 mEq Oral TID With Meals    senna (SENOKOT) tablet 8 6 mg  1 tablet Oral Daily     Medications Prior to Admission   Medication    aspirin (ECOTRIN LOW STRENGTH) 81 mg EC tablet    atenolol (TENORMIN) 50 mg tablet    atorvastatin (LIPITOR) 20 mg tablet    cholecalciferol (VITAMIN D3) 25 mcg (1,000 units) tablet    Cyanocobalamin (VITAMIN Y-43 PO)    folic acid (FOLVITE) 1 mg tablet    furosemide (LASIX) 20 mg tablet    lisinopril (ZESTRIL) 10 mg tablet     Counseling / Coordination of Care  Total floor / unit time spent today 20 minutes    Greater than 50% of total time was spent with the patient and / or family counseling and / or coordination of care  ** Please Note: Dragon [employer] Dictation voice to text software may have been used in the creation of this document   **

## 2021-06-14 NOTE — ASSESSMENT & PLAN NOTE
· Had been having poor urine output yesterday afternoon and overnight  · Patient's blood pressure was also low last night and still low normal today  · Cardiologist held off lisinopril and further diuresis and change Coreg to metoprolol tartrate  · Monitor kidney function  · Avoid nephrotoxins  · Avoid hypotension  · For further workup and management if this worsens significantly

## 2021-06-14 NOTE — PLAN OF CARE
Problem: PAIN - ADULT  Goal: Verbalizes/displays adequate comfort level or baseline comfort level  Description: Interventions:  - Encourage patient to monitor pain and request assistance  - Assess pain using appropriate pain scale  - Administer analgesics based on type and severity of pain and evaluate response  - Implement non-pharmacological measures as appropriate and evaluate response  - Consider cultural and social influences on pain and pain management  - Notify physician/advanced practitioner if interventions unsuccessful or patient reports new pain  Outcome: Progressing     Problem: SAFETY ADULT  Goal: Patient will remain free of falls  Description: INTERVENTIONS:  - Assess patient frequently for physical needs  -  Identify cognitive and physical deficits and behaviors that affect risk of falls    -  [place] fall precautions as indicated by assessment   - Educate patient/family on patient safety including physical limitations  - Instruct patient to call for assistance with activity based on assessment  - Modify environment to reduce risk of injury  - Consider OT/PT consult to assist with strengthening/mobility  Outcome: Progressing  Goal: Maintain or return to baseline ADL function  Description: INTERVENTIONS:  -  Assess patient's ability to carry out ADLs; assess patient's baseline for ADL function and identify physical deficits which impact ability to perform ADLs (bathing, care of mouth/teeth, toileting, grooming, dressing, etc )  - Assess/evaluate cause of self-care deficits   - Assess range of motion  - Assess patient's mobility; develop plan if impaired  - Assess patient's need for assistive devices and provide as appropriate  - Encourage maximum independence but intervene and supervise when necessary  - Involve family in performance of ADLs  - Assess for home care needs following discharge   - Consider OT consult to assist with ADL evaluation and planning for discharge  - Provide patient education as appropriate  Outcome: Progressing  Goal: Maintains/Returns to pre admission functional level  Description: INTERVENTIONS:  - Assess patient's baseline mobility status (ambulation, transfers, stairs, etc )    - Identify cognitive and physical deficits and behaviors that affect mobility  - Identify mobility aids required to assist with transfers and/or ambulation (gait belt, sit-to-stand, lift, walker, cane, etc )  - [place] fall precautions as indicated by assessment  - Record patient progress and toleration of activity level on Mobility SBAR; progress patient to next Phase/Stage  - Instruct patient to call for assistance with activity based on assessment  - Consider rehabilitation consult to assist with strengthening/weightbearing, etc   Outcome: Progressing     Problem: DISCHARGE PLANNING  Goal: Discharge to home or other facility with appropriate resources  Description: INTERVENTIONS:  - Identify barriers to discharge w/patient and caregiver  - Arrange for needed discharge resources and transportation as appropriate  - Identify discharge learning needs (meds, wound care, etc )  - Arrange for interpretive services to assist at discharge as needed  - Refer to Case Management Department for coordinating discharge planning if the patient needs post-hospital services based on physician/advanced practitioner order or complex needs related to functional status, cognitive ability, or social support system  Outcome: Progressing     Problem: Knowledge Deficit  Goal: Patient/family/caregiver demonstrates understanding of disease process, treatment plan, medications, and discharge instructions  Description: Complete learning assessment and assess knowledge base    Interventions:  - Provide teaching at level of understanding  - Provide teaching via preferred learning methods  Outcome: Progressing     Problem: Potential for Falls  Goal: Patient will remain free of falls  Description: INTERVENTIONS:  - Assess patient frequently for physical needs  -  Identify cognitive and physical deficits and behaviors that affect risk of falls    -  [place] fall precautions as indicated by assessment   - Educate patient/family on patient safety including physical limitations  - Instruct patient to call for assistance with activity based on assessment  - Modify environment to reduce risk of injury  - Consider OT/PT consult to assist with strengthening/mobility  Outcome: Progressing     Problem: MOBILITY - ADULT  Goal: Maintain or return to baseline ADL function  Description: INTERVENTIONS:  -  Assess patient's ability to carry out ADLs; assess patient's baseline for ADL function and identify physical deficits which impact ability to perform ADLs (bathing, care of mouth/teeth, toileting, grooming, dressing, etc )  - Assess/evaluate cause of self-care deficits   - Assess range of motion  - Assess patient's mobility; develop plan if impaired  - Assess patient's need for assistive devices and provide as appropriate  - Encourage maximum independence but intervene and supervise when necessary  - Involve family in performance of ADLs  - Assess for home care needs following discharge   - Consider OT consult to assist with ADL evaluation and planning for discharge  - Provide patient education as appropriate  Outcome: Progressing  Goal: Maintains/Returns to pre admission functional level  Description: INTERVENTIONS:  - Assess patient's baseline mobility status (ambulation, transfers, stairs, etc )    - Identify cognitive and physical deficits and behaviors that affect mobility  - Identify mobility aids required to assist with transfers and/or ambulation (gait belt, sit-to-stand, lift, walker, cane, etc )  - [place] fall precautions as indicated by assessment  - Record patient progress and toleration of activity level on Mobility SBAR; progress patient to next Phase/Stage  - Instruct patient to call for assistance with activity based on assessment  - Consider rehabilitation consult to assist with strengthening/weightbearing, etc   Outcome: Progressing     Problem: CARDIOVASCULAR - ADULT  Goal: Maintains optimal cardiac output and hemodynamic stability  Description: INTERVENTIONS:  - Monitor I/O, vital signs and rhythm  - Monitor for S/S and trends of decreased cardiac output  - Administer and titrate ordered vasoactive medications to optimize hemodynamic stability  - Assess quality of pulses, skin color and temperature  - Assess for signs of decreased coronary artery perfusion  - Instruct patient to report change in severity of symptoms  Outcome: Progressing  Goal: Absence of cardiac dysrhythmias or at baseline rhythm  Description: INTERVENTIONS:  - Continuous cardiac monitoring, vital signs, obtain 12 lead EKG if ordered  - Administer antiarrhythmic and heart rate control medications as ordered  - Monitor electrolytes and administer replacement therapy as ordered  Outcome: Progressing     Problem: RESPIRATORY - ADULT  Goal: Achieves optimal ventilation and oxygenation  Description: INTERVENTIONS:  - Assess for changes in respiratory status  - Assess for changes in mentation and behavior  - Position to facilitate oxygenation and minimize respiratory effort  - Oxygen administered by appropriate delivery if ordered  - Initiate smoking cessation education as indicated  - Encourage broncho-pulmonary hygiene including cough, deep breathe, Incentive Spirometry  - Assess the need for suctioning and aspirate as needed  - Assess and instruct to report SOB or any respiratory difficulty  - Respiratory Therapy support as indicated  Outcome: Progressing     Problem: METABOLIC, FLUID AND ELECTROLYTES - ADULT  Goal: Electrolytes maintained within normal limits  Description: INTERVENTIONS:  - Monitor labs and assess patient for signs and symptoms of electrolyte imbalances  - Administer electrolyte replacement as ordered  - Monitor response to electrolyte replacements, including repeat lab results as appropriate  - Instruct patient on fluid and nutrition as appropriate  Outcome: Progressing  Goal: Fluid balance maintained  Description: INTERVENTIONS:  - Monitor labs   - Monitor I/O and WT  - Instruct patient on fluid and nutrition as appropriate  - Assess for signs & symptoms of volume excess or deficit  Outcome: Progressing

## 2021-06-14 NOTE — ASSESSMENT & PLAN NOTE
Continue lisinopril, atenolol initially changed to Coreg  Monitor with diuresis  Today, with low blood pressure last night and still low normal blood pressures, cardiologist discontinued lisinopril and change Coreg to metoprolol tartrate

## 2021-06-14 NOTE — ASSESSMENT & PLAN NOTE
Patient had EKG that showed slow atrial fibrillation however per Cardiology appears to be sinus rhythm  History of atrial flutter with prior flutter ablation  On telemetry, appears normal sinus - this was d/c   Not on anticoagulation, continue aspirin  Atenolol initially changed to Coreg; today, Coreg was changed to metoprolol tartrate due to borderline blood pressures

## 2021-06-14 NOTE — PROGRESS NOTES
[facility]  Progress Note - Gabby Orozco 1960, 61 y o  male MRN: [id number]  Unit/Bed#: S -01 Encounter: [id number]  Primary Care Provider: Glae Carrero MD   Date and time admitted to hospital: 6/7/2021  7:27 PM    * Acute diastolic heart failure [facility])  Assessment & Plan  Patient presented to outpatient cardiologist 6/7 for routine follow-up with evidence volume overload and suspected heart failure  Referred to the ER by cardiologist   Symptomatically patient reports worsening lower extremity edema and dyspnea exertion x1 month  Minimal improvement with Lasix 20 mg daily  Appreciate input from Cardiology  Status post Lasix  Patient was switched to IV Bumex, 6/11  Continue with lisinopril 5 mg once a day  LVEF on echocardiogram 60%  Cardiology on board  2 g sodium diet, fluid restriction, daily weights, I/O  Today, with acute kidney injury and at times low blood pressures, cardiologist held off further diuresis and lisinopril  Patient's Coreg was switched to metoprolol tartrate  GENOVEVA (acute kidney injury) ([facility] [address] )  Assessment & Plan  · Had been having poor urine output yesterday afternoon and overnight  · Patient's blood pressure was also low last night and still low normal today  · Cardiologist held off lisinopril and further diuresis and change Coreg to metoprolol tartrate  · Monitor kidney function  · Avoid nephrotoxins  · Avoid hypotension  · For further workup and management if this worsens significantly  Paroxysmal atrial fibrillation [facility])  Assessment & Plan  Patient had EKG that showed slow atrial fibrillation however per Cardiology appears to be sinus rhythm  History of atrial flutter with prior flutter ablation  On telemetry, appears normal sinus - this was d/c   Not on anticoagulation, continue aspirin  Atenolol initially changed to Coreg; today, Coreg was changed to metoprolol tartrate due to borderline blood pressures      Venous stasis ulcers of both lower extremities ([address] )  Assessment & Plan  · On Real stockings  CASSIE (obstructive sleep apnea)  Assessment & Plan  Noncompliant with CPAP therapy, states he was unable to tolerate it    Morbid obesity (HCC)  Assessment & Plan  Recommend diet, weight loss, lifestyle modifications  BMI greater than 70  follows outpatient bariatric    Mixed hyperlipidemia  Assessment & Plan  Continue Lipitor  LDL 60    Essential hypertension  Assessment & Plan  Continue lisinopril, atenolol initially changed to Coreg  Monitor with diuresis  Today, with low blood pressure last night and still low normal blood pressures, cardiologist discontinued lisinopril and change Coreg to metoprolol tartrate  VTE Pharmacologic Prophylaxis:   Pharmacologic: Enoxaparin (Lovenox)  Mechanical VTE Prophylaxis in Place: Yes    Patient Centered Rounds: I have performed bedside rounds with nursing staff today  Discussions with Specialists or Other Care Team Provider:  Case management  Cardiologist     Education and Discussions with Family / Patient:  I discussed our findings, management and plans to the patient  Answered questions and concerns  He is okay with the management and plans  I called patient's wife, however, phone just keeps on ringing and no answer and no voicemail to leave messages  I spoke to her yesterday on the phone  Time Spent for Care: 30 minutes  More than 50% of total time spent on counseling and coordination of care as described above  Current Length of Stay: 7 day(s)    Current Patient Status: Inpatient   Certification Statement: The patient will continue to require additional inpatient hospital stay due to Above findings and plans  Discharge Plan:  None yet today  Code Status: Level 1 - Full Code      Subjective:   Patient is doing fine  Patient does not have any complaints  Patient denies any shortness of breath or any pains  Poor urine output overnight      Objective:     Vitals:   Temp (24hrs), Av 6 °F (36 4 °C), Min:97 5 °F (36 4 °C), Max:97 7 °F (36 5 °C)    Temp:  [97 5 °F (36 4 °C)-97 7 °F (36 5 °C)] 97 7 °F (36 5 °C)  HR:  [70-75] 75  Resp:  [18] 18  BP: ()/(48-64) 112/64  SpO2:  [92 %-97 %] 96 %  Body mass index is 67 89 kg/m²  Input and Output Summary (last 24 hours): Intake/Output Summary (Last 24 hours) at 2021 1731  Last data filed at 2021 1230  Gross per 24 hour   Intake 1200 ml   Output 725 ml   Net 475 ml       Physical Exam:     Physical Exam  Vitals and nursing note reviewed  Constitutional:       General: He is not in acute distress  Appearance: He is obese  He is not ill-appearing, toxic-appearing or diaphoretic  Cardiovascular:      Rate and Rhythm: Normal rate and regular rhythm  Heart sounds: Normal heart sounds  Pulmonary:      Effort: Pulmonary effort is normal  No respiratory distress  Breath sounds: Normal breath sounds  Abdominal:      General: Bowel sounds are normal  There is no distension  Palpations: Abdomen is soft  Tenderness: There is no abdominal tenderness  Musculoskeletal:      Right lower leg: Edema present  Left lower leg: Edema present  Skin:     General: Skin is warm  Coloration: Skin is not pale  Findings: No erythema or rash  Neurological:      General: No focal deficit present  Mental Status: He is alert  Psychiatric:         Mood and Affect: Mood normal          Behavior: Behavior normal          Thought Content:  Thought content normal           Additional Data:     Labs:    Results from last 7 days   Lab Units 217 21   WBC Thousand/uL 9 93 10 83*   HEMOGLOBIN g/dL 12 6 12 8   HEMATOCRIT % 40 5 40 2   PLATELETS Thousands/uL 211 211   NEUTROS PCT %  --  67   LYMPHS PCT %  --  20   MONOS PCT %  --  6   EOS PCT %  --  5     Results from last 7 days   Lab Units 21  0548   POTASSIUM mmol/L 4 2   CHLORIDE mmol/L 96*   CO2 mmol/L 29   BUN mg/dL 32* CREATININE mg/dL 1 70*   CALCIUM mg/dL 9 1   ALK PHOS U/L 92   ALT U/L 26   AST U/L 14           * I Have Reviewed All Lab Data Listed Above  * Additional Pertinent Lab Tests Reviewed: [address] Admission Reviewed    Imaging:    Imaging Reports Reviewed Today Include:  Diagnostic imaging studies that were done on this admission  Imaging Personally Reviewed by Myself Includes:  None  Recent Cultures (last 7 days):           Last 24 Hours Medication List:   Current Facility-Administered Medications   Medication Dose Route Frequency Provider Last Rate    acetaminophen  650 mg Oral Q6H PRN Zeeshan Pederson PA-C      aspirin  81 mg Oral Daily Zeeshan Pederson PA-C      atorvastatin  20 mg Oral HS Zeeshan Pederson PA-C      calcium carbonate  1,000 mg Oral Daily PRN Zeeshan Pederson PA-C      enoxaparin  40 mg Subcutaneous Daily Zeeshan Pederson PA-C      lisinopril  5 mg Oral Daily Berniece Clonts, CRNP      metoprolol tartrate  25 mg Oral Q12H [address] Berniece Clonts, CRNP      ondansetron  4 mg Intravenous Q6H PRN Zeeshan Pederson PA-C      senna  1 tablet Oral Daily Zeeshan Pederson PA-C          Today, Patient Was Seen By: Ninfa Farrell MD    ** Please Note: Dragon 360 Dictation voice to text software may have been used in the creation of this document   **

## 2021-06-14 NOTE — ASSESSMENT & PLAN NOTE
Patient presented to outpatient cardiologist 6/7 for routine follow-up with evidence volume overload and suspected heart failure  Referred to the ER by cardiologist   Symptomatically patient reports worsening lower extremity edema and dyspnea exertion x1 month  Minimal improvement with Lasix 20 mg daily  Appreciate input from Cardiology  Status post Lasix  Patient was switched to IV Bumex, 6/11  Continue with lisinopril 5 mg once a day  LVEF on echocardiogram 60%  Cardiology on board  2 g sodium diet, fluid restriction, daily weights, I/O  Today, with acute kidney injury and at times low blood pressures, cardiologist held off further diuresis and lisinopril  Patient's Coreg was switched to metoprolol tartrate

## 2021-06-15 VITALS
DIASTOLIC BLOOD PRESSURE: 58 MMHG | RESPIRATION RATE: 18 BRPM | SYSTOLIC BLOOD PRESSURE: 125 MMHG | OXYGEN SATURATION: 91 % | HEART RATE: 74 BPM | HEIGHT: 67 IN | WEIGHT: 315 LBS | BODY MASS INDEX: 49.44 KG/M2 | TEMPERATURE: 97.8 F

## 2021-06-15 DIAGNOSIS — I10 ESSENTIAL (PRIMARY) HYPERTENSION: ICD-10-CM

## 2021-06-15 LAB
ANION GAP SERPL CALCULATED.3IONS-SCNC: 7 MMOL/L (ref 4–13)
BACTERIA UR QL AUTO: ABNORMAL /HPF
BILIRUB UR QL STRIP: NEGATIVE
BUN SERPL-MCNC: 29 MG/DL (ref 5–25)
CALCIUM SERPL-MCNC: 9.3 MG/DL (ref 8.3–10.1)
CHLORIDE SERPL-SCNC: 97 MMOL/L (ref 100–108)
CLARITY UR: CLEAR
CO2 SERPL-SCNC: 31 MMOL/L (ref 21–32)
COLOR UR: YELLOW
CREAT SERPL-MCNC: 1.18 MG/DL (ref 0.6–1.3)
GFR SERPL CREATININE-BSD FRML MDRD: 67 ML/MIN/1.73SQ M
GLUCOSE SERPL-MCNC: 125 MG/DL (ref 65–140)
GLUCOSE UR STRIP-MCNC: NEGATIVE MG/DL
HGB UR QL STRIP.AUTO: NEGATIVE
KETONES UR STRIP-MCNC: NEGATIVE MG/DL
LEUKOCYTE ESTERASE UR QL STRIP: NEGATIVE
NITRITE UR QL STRIP: NEGATIVE
NON-SQ EPI CELLS URNS QL MICRO: ABNORMAL /HPF
PH UR STRIP.AUTO: 6 [PH]
POTASSIUM SERPL-SCNC: 4.6 MMOL/L (ref 3.5–5.3)
PROT UR STRIP-MCNC: NEGATIVE MG/DL
RBC #/AREA URNS AUTO: ABNORMAL /HPF
SODIUM SERPL-SCNC: 135 MMOL/L (ref 136–145)
SP GR UR STRIP.AUTO: 1.01 (ref 1–1.03)
UROBILINOGEN UR QL STRIP.AUTO: 0.2 E.U./DL
WBC #/AREA URNS AUTO: ABNORMAL /HPF

## 2021-06-15 PROCEDURE — 99232 SBSQ HOSP IP/OBS MODERATE 35: CPT | Performed by: INTERNAL MEDICINE

## 2021-06-15 PROCEDURE — 81001 URINALYSIS AUTO W/SCOPE: CPT | Performed by: INTERNAL MEDICINE

## 2021-06-15 PROCEDURE — 80048 BASIC METABOLIC PNL TOTAL CA: CPT | Performed by: NURSE PRACTITIONER

## 2021-06-15 PROCEDURE — 99239 HOSP IP/OBS DSCHRG MGMT >30: CPT | Performed by: INTERNAL MEDICINE

## 2021-06-15 RX ORDER — BUMETANIDE 2 MG/1
2 TABLET ORAL DAILY
Qty: 30 TABLET | Refills: 0 | Status: SHIPPED | OUTPATIENT
Start: 2021-06-16 | End: 2021-06-23 | Stop reason: SDUPTHER

## 2021-06-15 RX ORDER — SENNOSIDES 8.6 MG
8.6 TABLET ORAL DAILY
Qty: 30 TABLET | Refills: 0 | Status: SHIPPED | OUTPATIENT
Start: 2021-06-16

## 2021-06-15 RX ORDER — POTASSIUM CHLORIDE 20 MEQ/1
20 TABLET, EXTENDED RELEASE ORAL DAILY
Qty: 10 TABLET | Refills: 0 | Status: SHIPPED | OUTPATIENT
Start: 2021-06-16 | End: 2021-06-23 | Stop reason: SDUPTHER

## 2021-06-15 RX ORDER — LISINOPRIL 10 MG/1
10 TABLET ORAL DAILY
Qty: 30 TABLET | Refills: 0 | Status: SHIPPED | OUTPATIENT
Start: 2021-06-15 | End: 2021-06-15

## 2021-06-15 RX ORDER — BUMETANIDE 1 MG/1
2 TABLET ORAL DAILY
Status: DISCONTINUED | OUTPATIENT
Start: 2021-06-15 | End: 2021-06-15 | Stop reason: HOSPADM

## 2021-06-15 RX ORDER — CALCIUM CARBONATE 200(500)MG
1000 TABLET,CHEWABLE ORAL DAILY PRN
Qty: 30 TABLET | Refills: 0 | Status: SHIPPED | OUTPATIENT
Start: 2021-06-15

## 2021-06-15 RX ORDER — LISINOPRIL 10 MG/1
TABLET ORAL
Qty: 90 TABLET | Refills: 2 | Status: SHIPPED | OUTPATIENT
Start: 2021-06-15 | End: 2021-06-23 | Stop reason: ALTCHOICE

## 2021-06-15 RX ORDER — POTASSIUM CHLORIDE 20 MEQ/1
20 TABLET, EXTENDED RELEASE ORAL DAILY
Status: DISCONTINUED | OUTPATIENT
Start: 2021-06-16 | End: 2021-06-15 | Stop reason: HOSPADM

## 2021-06-15 RX ADMIN — METOPROLOL TARTRATE 25 MG: 25 TABLET, FILM COATED ORAL at 08:52

## 2021-06-15 RX ADMIN — ENOXAPARIN SODIUM 40 MG: 40 INJECTION SUBCUTANEOUS at 08:52

## 2021-06-15 RX ADMIN — ASPIRIN 81 MG: 81 TABLET, COATED ORAL at 08:52

## 2021-06-15 RX ADMIN — LISINOPRIL 5 MG: 5 TABLET ORAL at 08:52

## 2021-06-15 RX ADMIN — SENNOSIDES 8.6 MG: 8.6 TABLET ORAL at 08:52

## 2021-06-15 RX ADMIN — BUMETANIDE 2 MG: 1 TABLET ORAL at 13:09

## 2021-06-15 NOTE — CASE MANAGEMENT
CM informed by SLIM that patient is medically stable for discharge home today  CM met with patient at bedside to review discharge plan  He confirms that his wife is already on the way to come and pick him up  Patient does already have a RW at home and has the OP PT guide in his patient belongings bag for follow up at discharge  CM reviewed the [address] Notification Letter  IMM reviewed with patient  patient agrees with discharge determination; copy provided  No other CM needs identified at this time

## 2021-06-15 NOTE — DISCHARGE INSTR - AVS FIRST PAGE
Dear Dasha Cho,     It was our pleasure to care for you here at formerly [facility]  It is our hope that we were always able to exceed the expected standards for your care during your stay  You were hospitalized due to  shortness of breath  You were cared for on the 3rd floor under the service of Theo Weston MD with the [facility] who covers for your primary care physician (PCP), Stephanie Pathak MD, while you were hospitalized  If you have any questions or concerns related to this hospitalization, you may contact us at [phone]  For follow up as well as medication refills, we recommend that you follow up with your primary care physician  A registered nurse will reach out to you by phone within a few days after your discharge to answer any additional questions that you may have after going home    However, at this time we provide for you here, the most important instructions / recommendations at discharge:     · Notable Medication Adjustments -   · Your started on Bumex 2 mg once a day  · Your started on potassium 20 mEq once a day  · You should have blood work checked this week or early next week  · You should follow-up with her primary care physician this week or next week with results of blood work  · Testing Required after Discharge -   · Basic metabolic panel please call your PCP to get this ordered within the next 2-3 days  · Important follow up information -   · Please see your PCP later this week early next week  · Please see Cardiology as advised  · Other Instructions -   · Weight instructions as per Cardiology if you note weight gain 2 lb a day for more than 2 consecutive days please call cardiology or your PCP if you gain 5 lb or more in 1 day please call Cardiology or PCP  · Please review this entire after visit summary as additional general instructions including medication list, appointments, activity, diet, any pertinent wound care, and other additional recommendations from your care team that may be provided for you        Sincerely,     Crystal Sylvester MD

## 2021-06-15 NOTE — ASSESSMENT & PLAN NOTE
Discharged on 2 mg of Bumex daily  Potassium daily and follow-up labs  Close follow-up with Cardiology in the outpatient setting

## 2021-06-15 NOTE — PLAN OF CARE
Problem: PAIN - ADULT  Goal: Verbalizes/displays adequate comfort level or baseline comfort level  Description: Interventions:  - Encourage patient to monitor pain and request assistance  - Assess pain using appropriate pain scale  - Administer analgesics based on type and severity of pain and evaluate response  - Implement non-pharmacological measures as appropriate and evaluate response  - Consider cultural and social influences on pain and pain management  - Notify physician/advanced practitioner if interventions unsuccessful or patient reports new pain  Outcome: Progressing     Problem: SAFETY ADULT  Goal: Patient will remain free of falls  Description: INTERVENTIONS:  - Assess patient frequently for physical needs  -  Identify cognitive and physical deficits and behaviors that affect risk of falls    -  [place] fall precautions as indicated by assessment   - Educate patient/family on patient safety including physical limitations  - Instruct patient to call for assistance with activity based on assessment  - Modify environment to reduce risk of injury  - Consider OT/PT consult to assist with strengthening/mobility  Outcome: Progressing  Goal: Maintain or return to baseline ADL function  Description: INTERVENTIONS:  -  Assess patient's ability to carry out ADLs; assess patient's baseline for ADL function and identify physical deficits which impact ability to perform ADLs (bathing, care of mouth/teeth, toileting, grooming, dressing, etc )  - Assess/evaluate cause of self-care deficits   - Assess range of motion  - Assess patient's mobility; develop plan if impaired  - Assess patient's need for assistive devices and provide as appropriate  - Encourage maximum independence but intervene and supervise when necessary  - Involve family in performance of ADLs  - Assess for home care needs following discharge   - Consider OT consult to assist with ADL evaluation and planning for discharge  - Provide patient education as appropriate  Outcome: Progressing  Goal: Maintains/Returns to pre admission functional level  Description: INTERVENTIONS:  - Assess patient's baseline mobility status (ambulation, transfers, stairs, etc )    - Identify cognitive and physical deficits and behaviors that affect mobility  - Identify mobility aids required to assist with transfers and/or ambulation (gait belt, sit-to-stand, lift, walker, cane, etc )  - [place] fall precautions as indicated by assessment  - Record patient progress and toleration of activity level on Mobility SBAR; progress patient to next Phase/Stage  - Instruct patient to call for assistance with activity based on assessment  - Consider rehabilitation consult to assist with strengthening/weightbearing, etc   Outcome: Progressing     Problem: DISCHARGE PLANNING  Goal: Discharge to home or other facility with appropriate resources  Description: INTERVENTIONS:  - Identify barriers to discharge w/patient and caregiver  - Arrange for needed discharge resources and transportation as appropriate  - Identify discharge learning needs (meds, wound care, etc )  - Arrange for interpretive services to assist at discharge as needed  - Refer to Case Management Department for coordinating discharge planning if the patient needs post-hospital services based on physician/advanced practitioner order or complex needs related to functional status, cognitive ability, or social support system  Outcome: Progressing     Problem: Knowledge Deficit  Goal: Patient/family/caregiver demonstrates understanding of disease process, treatment plan, medications, and discharge instructions  Description: Complete learning assessment and assess knowledge base    Interventions:  - Provide teaching at level of understanding  - Provide teaching via preferred learning methods  Outcome: Progressing     Problem: Potential for Falls  Goal: Patient will remain free of falls  Description: INTERVENTIONS:  - Assess patient frequently for physical needs  -  Identify cognitive and physical deficits and behaviors that affect risk of falls    -  [place] fall precautions as indicated by assessment   - Educate patient/family on patient safety including physical limitations  - Instruct patient to call for assistance with activity based on assessment  - Modify environment to reduce risk of injury  - Consider OT/PT consult to assist with strengthening/mobility  Outcome: Progressing     Problem: MOBILITY - ADULT  Goal: Maintain or return to baseline ADL function  Description: INTERVENTIONS:  -  Assess patient's ability to carry out ADLs; assess patient's baseline for ADL function and identify physical deficits which impact ability to perform ADLs (bathing, care of mouth/teeth, toileting, grooming, dressing, etc )  - Assess/evaluate cause of self-care deficits   - Assess range of motion  - Assess patient's mobility; develop plan if impaired  - Assess patient's need for assistive devices and provide as appropriate  - Encourage maximum independence but intervene and supervise when necessary  - Involve family in performance of ADLs  - Assess for home care needs following discharge   - Consider OT consult to assist with ADL evaluation and planning for discharge  - Provide patient education as appropriate  Outcome: Progressing  Goal: Maintains/Returns to pre admission functional level  Description: INTERVENTIONS:  - Assess patient's baseline mobility status (ambulation, transfers, stairs, etc )    - Identify cognitive and physical deficits and behaviors that affect mobility  - Identify mobility aids required to assist with transfers and/or ambulation (gait belt, sit-to-stand, lift, walker, cane, etc )  - [place] fall precautions as indicated by assessment  - Record patient progress and toleration of activity level on Mobility SBAR; progress patient to next Phase/Stage  - Instruct patient to call for assistance with activity based on assessment  - Consider rehabilitation consult to assist with strengthening/weightbearing, etc   Outcome: Progressing     Problem: CARDIOVASCULAR - ADULT  Goal: Maintains optimal cardiac output and hemodynamic stability  Description: INTERVENTIONS:  - Monitor I/O, vital signs and rhythm  - Monitor for S/S and trends of decreased cardiac output  - Administer and titrate ordered vasoactive medications to optimize hemodynamic stability  - Assess quality of pulses, skin color and temperature  - Assess for signs of decreased coronary artery perfusion  - Instruct patient to report change in severity of symptoms  Outcome: Progressing  Goal: Absence of cardiac dysrhythmias or at baseline rhythm  Description: INTERVENTIONS:  - Continuous cardiac monitoring, vital signs, obtain 12 lead EKG if ordered  - Administer antiarrhythmic and heart rate control medications as ordered  - Monitor electrolytes and administer replacement therapy as ordered  Outcome: Progressing     Problem: RESPIRATORY - ADULT  Goal: Achieves optimal ventilation and oxygenation  Description: INTERVENTIONS:  - Assess for changes in respiratory status  - Assess for changes in mentation and behavior  - Position to facilitate oxygenation and minimize respiratory effort  - Oxygen administered by appropriate delivery if ordered  - Initiate smoking cessation education as indicated  - Encourage broncho-pulmonary hygiene including cough, deep breathe, Incentive Spirometry  - Assess the need for suctioning and aspirate as needed  - Assess and instruct to report SOB or any respiratory difficulty  - Respiratory Therapy support as indicated  Outcome: Progressing     Problem: METABOLIC, FLUID AND ELECTROLYTES - ADULT  Goal: Electrolytes maintained within normal limits  Description: INTERVENTIONS:  - Monitor labs and assess patient for signs and symptoms of electrolyte imbalances  - Administer electrolyte replacement as ordered  - Monitor response to electrolyte replacements, including repeat lab results as appropriate  - Instruct patient on fluid and nutrition as appropriate  Outcome: Progressing  Goal: Fluid balance maintained  Description: INTERVENTIONS:  - Monitor labs   - Monitor I/O and WT  - Instruct patient on fluid and nutrition as appropriate  - Assess for signs & symptoms of volume excess or deficit  Outcome: Progressing

## 2021-06-15 NOTE — PROGRESS NOTES
General Cardiology   Progress Note -  [facility]   Lanny Cosby 61 y o  male MRN: [id number]    Unit/Bed#: S -01 Encounter: [id number]    Assessment/ Plan    1  Acute diastolic heart failure  Diuresed with bumex 2 mg IV TID discontinued yesterday due to low BP and elevated creatinine   Patient previously took furosemide 20 mg PO daily at home  Monitor I/Os -9 6 L since admission   Creatine 1 1 today from 1 7 yesterday  Daily weights: 426 lbs today from 443 lbs on admission    On 2 gram Na diet and 1500 ml fluid restriction   Reviewed HF education with patient and wife   Will start bumex 2 mg PO daily today      2  History of atrial flutter ablation   Not on OAC    On metoprolol 25 mg PO BID     3  Hypertension 109/59 average BP   On lisinopril and metoprolol      4  Morbid obesity counseled on lifestyle modifications      5  Hyperlipidemia LDL 60  Continue statin      6  Untreated CASSIE   Follow up as outpatient     Patient will follow up with Morris BAEZA 6/23/2021  Subjective  Patient is eager to go home  No complaint of chest pain, SOB or palpitations  No fever or chills  Review of Systems   Constitutional: Negative for chills, fever and malaise/fatigue  HENT: Negative for congestion  Cardiovascular: Positive for leg swelling  Negative for chest pain, dyspnea on exertion, orthopnea and palpitations  Respiratory: Negative for cough and shortness of breath  Musculoskeletal: Negative for back pain  Gastrointestinal: Negative for bloating, nausea and vomiting  Neurological: Negative for dizziness and light-headedness  Psychiatric/Behavioral: Negative for altered mental status  All other systems reviewed and are negative  Objective:   Vitals: Blood pressure 125/58, pulse 74, temperature 97 8 °F (36 6 °C), temperature source Oral, resp  rate 18, height 5' 6 5" (1 689 m), weight (!) 193 kg (426 lb), SpO2 91 % ,       Body mass index is 67 73 kg/m²  ,     Systolic (97LUC), Av , Min:90 , SFT:625     Diastolic (66NVT), FG, Min:55, Max:64          Intake/Output Summary (Last 24 hours) at 6/15/2021 1022  Last data filed at 6/15/2021 0558  Gross per 24 hour   Intake 600 ml   Output 1325 ml   Net -725 ml     Weight (last 2 days)     Date/Time   Weight    06/15/21 0551   (!) 193 (426)    21 0545   (!) 194 (427)    21 0600   (!) 194 (427 6)            Telemetry Review: No telemetry    Physical Exam  Constitutional:       General: He is not in acute distress  Appearance: He is obese  HENT:      Head: Normocephalic  Mouth/Throat:      Mouth: Mucous membranes are moist    Cardiovascular:      Rate and Rhythm: Normal rate and regular rhythm  Pulses: Normal pulses  Heart sounds: No murmur heard  Pulmonary:      Effort: Pulmonary effort is normal  No respiratory distress  Breath sounds: Normal breath sounds  Abdominal:      General: Bowel sounds are normal       Palpations: Abdomen is soft  Musculoskeletal:         General: Swelling present  Normal range of motion  Cervical back: Neck supple  Comments: + 1 edema bilateral LE    Skin:     General: Skin is warm and dry  Capillary Refill: Capillary refill takes less than 2 seconds  Neurological:      General: No focal deficit present  Mental Status: He is alert and oriented to person, place, and time     Psychiatric:         Mood and Affect: Mood normal          LABORATORY RESULTS      CBC with diff:   Results from last 7 days   Lab Units 21  0447   WBC Thousand/uL 9 93   HEMOGLOBIN g/dL 12 6   HEMATOCRIT % 40 5   MCV fL 100*   PLATELETS Thousands/uL 211   MCH pg 31 0   MCHC g/dL 31 1*   RDW % 13 0   MPV fL 10 4       CMP:  Results from last 7 days   Lab Units 06/15/21  0515 21  0548 21  0608 21  0233 21  0506 06/10/21  0547 21  0447   POTASSIUM mmol/L 4 6 4 2 4 1 4 2 4 0 4 2 3 8   CHLORIDE mmol/L 97* 96* 96* 98* 99* 100 101   CO2 mmol/L 31 29 29 32 30 32 32   BUN mg/dL 29* 32* 23 27* 27* 24 19   CREATININE mg/dL 1 18 1 70* 1 09 1 15 1 22 1 08 0 94   CALCIUM mg/dL 9 3 9 1 9 4 8 7 8 9 8 8 9 0   AST U/L  --  14  --   --   --   --   --    ALT U/L  --  26  --   --   --   --   --    ALK PHOS U/L  --  92  --   --   --   --   --    EGFR ml/min/1 73sq m 67 43 73 69 64 74 88       BMP:  Results from last 7 days   Lab Units 06/15/21  0515 21  0548 21  5082 21  0233 21  0506 06/10/21  0547 21  0447   POTASSIUM mmol/L 4 6 4 2 4 1 4 2 4 0 4 2 3 8   CHLORIDE mmol/L 97* 96* 96* 98* 99* 100 101   CO2 mmol/L 31 29 29 32 30 32 32   BUN mg/dL 29* 32* 23 27* 27* 24 19   CREATININE mg/dL 1 18 1 70* 1 09 1 15 1 22 1 08 0 94   CALCIUM mg/dL 9 3 9 1 9 4 8 7 8 9 8 8 9 0       Lab Results   Component Value Date    NTBNP 425 (H) 2021    NTBNP 975 (H) 2021                                     Lipid Profile:   No results found for: CHOL  Lab Results   Component Value Date    HDL 37 (L) 2021     Lab Results   Component Value Date    LDLCALC 60 2021     Lab Results   Component Value Date    TRIG 210 (H) 2021       Cardiac testing:   Results for orders placed during the hospital encounter of 21    Echo complete with contrast if indicated    Narrative  [facility] [address], [address] [facility]  [phone]    Transthoracic Echocardiogram  2D, M-mode, Doppler, and Color Doppler    Study date:  2021    Patient: Jc Torres  MR number: [id number]  Account number: [de-identified]  : 1960  Age: 61 years  Gender: Male  Status: Inpatient  Location: Bedside  Height: 66 in  Weight: 442 lb  BP: 106/ 58 mmHg    Indications: Heart failure      Diagnoses: I50 9 - Heart failure, unspecified    Sonographer:  DONELL Jaimes  Primary Physician:  Jerlene Sacks, MD  Referring Physician:  Lindsay Rowland MD  [facility]  [facility]  Interpreting Physician:  Blake Rosario, DO    SUMMARY    PROCEDURE INFORMATION:  This was a technically difficult study despite administration of echo contrast     LEFT VENTRICLE:  Systolic function was normal  Ejection fraction was estimated to be 60 %  Although no diagnostic regional wall motion abnormality was identified, this possibility cannot be completely excluded on the basis of this study  Wall thickness was mildly increased  LEFT ATRIUM:  The atrium was dilated  HISTORY: PRIOR HISTORY: HTN, HLD, morbid obesity, CASSIE, PAF, former smoker  PROCEDURE: The procedure was performed at the bedside  This was a routine study  The transthoracic approach was used  The study included complete 2D imaging, M-mode, complete spectral Doppler, and color Doppler  The heart rate was 74 bpm,  at the start of the study  Images were obtained from the parasternal, apical, subcostal, and suprasternal notch acoustic windows  Intravenous contrast ( 1 0ml Definity in NSS) was administered to opacify the left ventricle  Echocardiographic views were limited due to poor acoustic window availability and decreased penetration  This was a technically difficult study despite administration of echo contrast     LEFT VENTRICLE: Size was normal  Systolic function was normal  Ejection fraction was estimated to be 60 %  Although no diagnostic regional wall motion abnormality was identified, this possibility cannot be completely excluded on the basis  of this study  Wall thickness was mildly increased  DOPPLER: The study was not technically sufficient to allow evaluation of LV diastolic function  RIGHT VENTRICLE: Not well visualized  LEFT ATRIUM: The atrium was dilated  Not well visualized  RIGHT ATRIUM: Not well visualized  MITRAL VALVE: Valve structure was normal  There was normal leaflet separation  DOPPLER: The transmitral velocity was within the normal range  There was no evidence for stenosis  There was no significant regurgitation      AORTIC VALVE: The valve was trileaflet  Leaflets exhibited normal thickness and normal cuspal separation  DOPPLER: Transaortic velocity was within the normal range  There was no evidence for stenosis  There was no regurgitation  TRICUSPID VALVE: The valve structure was normal  There was normal leaflet separation  DOPPLER: The transtricuspid velocity was within the normal range  There was no evidence for stenosis  There was trace regurgitation  The tricuspid jet  envelope definition was inadequate for estimation of RV systolic pressure  PULMONIC VALVE: Leaflets exhibited normal thickness, no calcification, and normal cuspal separation  DOPPLER: The transpulmonic velocity was within the normal range  There was trace regurgitation  PERICARDIUM: There was no pericardial effusion  The pericardium was normal in appearance  AORTA: The root exhibited normal size  SYSTEMIC VEINS: IVC: Not assessed  SYSTEM MEASUREMENT TABLES    2D  %FS: 27 26 %  Ao Diam: 3 82 cm  EDV(Teich): 205 8 ml  EF(Teich): 52 02 %  ESV(Teich): 98 74 ml  IVSd: 1 1 cm  LA Diam: 6 04 cm  LVIDd: 6 36 cm  LVIDs: 4 63 cm  LVPWd: 1 1 cm  SV(Teich): 107 06 ml    CW  AV Env  Ti: 262 61 ms  AV MaxP 73 mmHg  AV VTI: 30 33 cm  AV Vmax: 1 85 m/s  AV Vmean: 1 16 m/s  AV meanP 25 mmHg    PW  E' Sept: 0 08 m/s  E/E' Sept: 11 31  LVOT Env  Ti: 293 05 ms  LVOT VTI: 20 29 cm  LVOT Vmax: 0 99 m/s  LVOT Vmean: 0 69 m/s  LVOT maxPG: 3 93 mmHg  LVOT meanP 18 mmHg  MV A [place]: 0 81 m/s  MV Dec [place]: 4 84 m/s2  MV DecT: 180 45 ms  MV E [place]: 0 87 m/s  MV E/A Ratio: 1 08  MV PHT: 52 33 ms  MVA By PHT: 4 2 cm2  PV Acc [place]: 8 68 m/s2  PV AccT: 95 15 ms    [employer] Accredited Echocardiography Laboratory    Prepared and electronically signed by    Alden Morrow DO  Signed 2021 17:34:37    No results found for this or any previous visit  No results found for this or any previous visit  No valid procedures specified    No results found for this or any previous visit  Meds/Allergies   all current active meds have been reviewed and current meds:   Current Facility-Administered Medications   Medication Dose Route Frequency    acetaminophen (TYLENOL) tablet 650 mg  650 mg Oral Q6H PRN    aspirin (ECOTRIN LOW STRENGTH) EC tablet 81 mg  81 mg Oral Daily    atorvastatin (LIPITOR) tablet 20 mg  20 mg Oral HS    calcium carbonate (TUMS) chewable tablet 1,000 mg  1,000 mg Oral Daily PRN    enoxaparin (LOVENOX) subcutaneous injection 40 mg  40 mg Subcutaneous Daily    lisinopril (ZESTRIL) tablet 5 mg  5 mg Oral Daily    metoprolol tartrate (LOPRESSOR) tablet 25 mg  25 mg Oral Q12H NEELIMA    ondansetron (ZOFRAN) injection 4 mg  4 mg Intravenous Q6H PRN    senna (SENOKOT) tablet 8 6 mg  1 tablet Oral Daily     Medications Prior to Admission   Medication    aspirin (ECOTRIN LOW STRENGTH) 81 mg EC tablet    atenolol (TENORMIN) 50 mg tablet    atorvastatin (LIPITOR) 20 mg tablet    cholecalciferol (VITAMIN D3) 25 mcg (1,000 units) tablet    Cyanocobalamin (VITAMIN D-37 PO)    folic acid (FOLVITE) 1 mg tablet    furosemide (LASIX) 20 mg tablet    lisinopril (ZESTRIL) 10 mg tablet       Counseling / Coordination of Care  Total floor / unit time spent today 20 minutes  Greater than 50% of total time was spent with the patient and / or family counseling and / or coordination of care  ** Please Note: Dragon 360 Dictation voice to text software may have been used in the creation of this document   **

## 2021-06-15 NOTE — DISCHARGE SUMMARY
[facility]  Discharge- Torsten Jacobsen 1960, 61 y o  male MRN: [id number]  Unit/Bed#: S -01 Encounter: [id number]  Primary Care Provider: Joaquim Ferguson MD   Date and time admitted to hospital: 6/7/2021  7:27 PM    * Acute diastolic heart failure [facility])  Assessment & Plan  Discharged on 2 mg of Bumex daily  Potassium daily and follow-up labs  Close follow-up with Cardiology in the outpatient setting    GENOVEVA (acute kidney injury) ([facility] [address] )  Assessment & Plan    Creatinine 1  18  Day before was 1 7  Should have follow-up BMP outpatient    Paroxysmal atrial fibrillation [facility])  Assessment & Plan  Patient had EKG that showed slow atrial fibrillation however per Cardiology appears to be sinus rhythm  History of atrial flutter with prior flutter ablation  On telemetry, appears normal sinus - this was d/c   Not on anticoagulation, continue aspirin  Atenolol initially changed to Coreg; today, Coreg was changed to metoprolol tartrate due to borderline blood pressures  Venous stasis ulcers of both lower extremities (HCC)  Assessment & Plan  On Real stockings      CASSIE (obstructive sleep apnea)  Assessment & Plan  Noncompliant with CPAP therapy, states he was unable to tolerate it    Morbid obesity (HCC)  Assessment & Plan  Recommend diet, weight loss, lifestyle modifications  BMI greater than 70  follows outpatient bariatric    Mixed hyperlipidemia  Assessment & Plan  Continue Lipitor      Essential hypertension  Assessment & Plan  Discharged on metoprolol 25 mg twice a day lisinopril 5 mg once a day and Bumex 2 mg daily        Discharging Physician / Practitioner: Clarita Camarillo MD  PCP: Joaquim Ferguson MD  Admission Date:   Admission Orders (From admission, onward)     Ordered        06/07/21 2048  Inpatient Admission  Once                   Discharge Date: 06/15/21    Medical Problems     Resolved Problems  Date Reviewed: 6/15/2021        Resolved    Leukocytosis 6/11/2021     Resolved by Anthony Yarbrough MD                Consultations During Hospital Stay:  · Cardiology    Procedures Performed:     Echocardiogram June 8th    Significant Findings / Test Results:   · None    Incidental Findings:   · None     Test Results Pending at Discharge (will require follow up): · None     Outpatient Tests Requested:  · BMP to be done outpatient    Complications:  Shortness of breath    Reason for Admission: shortness of breath  Hospital Course:     Kimmie Barraza is a 61 y o  male patient who originally presented to the hospital on 6/7/2021 due to shortness of breath  He was on Lasix at home but was discharged on Bumex 2 mg once a day  He was followed by Cardiology while he was here and was euvolemic on the day of discharge  Please see above list of diagnoses and related plan for additional information  Condition at Discharge: stable     Discharge Day Visit / Exam:     Subjective:    Patient seen and examined  No new symptoms  Feeling okay  Vitals: Blood Pressure: 125/58 (06/15/21 0700)  Pulse: 74 (06/15/21 0700)  Temperature: 97 8 °F (36 6 °C) (06/15/21 0700)  Temp Source: Oral (06/15/21 0700)  Respirations: 18 (06/15/21 0700)  Height: 5' 6 5" (168 9 cm) (06/08/21 0110)  Weight - Scale: (!) 193 kg (426 lb) (06/15/21 0551)  SpO2: 91 % (06/15/21 0700)  Exam:   Physical Exam  Constitutional:       Appearance: He is not toxic-appearing  Comments: Obese   HENT:      Head: Normocephalic  Comments: No JVP  Mouth/Throat:      Mouth: Mucous membranes are moist    Eyes:      General: No scleral icterus  Right eye: No discharge  Left eye: No discharge  Pupils: Pupils are equal, round, and reactive to light  Cardiovascular:      Rate and Rhythm: Normal rate  Heart sounds: No murmur heard  Pulmonary:      Effort: Pulmonary effort is normal  No respiratory distress  Breath sounds: No stridor  No wheezing, rhonchi or rales     Chest:      Chest wall: No tenderness  Abdominal:      General: There is no distension  Palpations: There is no mass  Tenderness: There is no abdominal tenderness  There is no right CVA tenderness, left CVA tenderness, guarding or rebound  Hernia: No hernia is present  Musculoskeletal:         General: No swelling, tenderness, deformity or signs of injury  Normal range of motion  Right lower leg: Edema (mild) present  Left lower leg: Edema present  Skin:     Capillary Refill: Capillary refill takes less than 2 seconds  Coloration: Skin is not jaundiced or pale  Findings: No bruising, erythema, lesion or rash  Neurological:      General: No focal deficit present  Mental Status: He is alert  Cranial Nerves: No cranial nerve deficit  Sensory: No sensory deficit  Motor: No weakness  Coordination: Coordination abnormal       Gait: Gait normal       Deep Tendon Reflexes: Reflexes normal    Psychiatric:         Mood and Affect: Mood normal        Discussion with Family:  Discussed with patient    Discharge instructions/Information to patient and family:   See after visit summary for information provided to patient and family  Provisions for Follow-Up Care:  See after visit summary for information related to follow-up care and any pertinent home health orders  Disposition:     Home    For Discharges to [facility]:   · Not Applicable to this Patient - Not Applicable to this Patient    Planned Readmission: none   Discharge Statement:  I spent 45 minutes discharging the patient  This time was spent on the day of discharge  I had direct contact with the patient on the day of discharge  Greater than 50% of the total time was spent examining patient, answering all patient questions, arranging and discussing plan of care with patient as well as directly providing post-discharge instructions    Additional time then spent on discharge activities  Discharge Medications:  See after visit summary for reconciled discharge medications provided to patient and family        ** Please Note: This note has been constructed using a voice recognition system **

## 2021-06-16 ENCOUNTER — TRANSITIONAL CARE MANAGEMENT (OUTPATIENT)
Dept: FAMILY MEDICINE CLINIC | Facility: CLINIC | Age: 61
End: 2021-06-16

## 2021-06-16 ENCOUNTER — PATIENT OUTREACH (OUTPATIENT)
Dept: CASE MANAGEMENT | Facility: OTHER | Age: 61
End: 2021-06-16

## 2021-06-16 ENCOUNTER — TELEPHONE (OUTPATIENT)
Dept: FAMILY MEDICINE CLINIC | Facility: CLINIC | Age: 61
End: 2021-06-16

## 2021-06-16 DIAGNOSIS — I48.0 PAROXYSMAL ATRIAL FIBRILLATION (HCC): ICD-10-CM

## 2021-06-16 DIAGNOSIS — I50.31 ACUTE DIASTOLIC HEART FAILURE (HCC): ICD-10-CM

## 2021-06-16 DIAGNOSIS — I10 ESSENTIAL HYPERTENSION: Primary | ICD-10-CM

## 2021-06-16 NOTE — TELEPHONE ENCOUNTER
Patient is asking for a rx order for routine blood work  Hospital told him to get a rx for blood work from his primary  BMP?

## 2021-06-16 NOTE — PROGRESS NOTES
In basket notification received of hospital discharge received  Chart reviewed  I spoke with patient who is home and feeling better  I went over his medications from the discharge instructions  He reports he is taking them as directed  He thinks one is causing nausea  He has a follow up with cardiology on 6/23 and will discuss  He has no transportation barriers  I reeducated patient on daily weights, the red flags and when to report  He took my contact information and repeated it to me  I advised him I would call after his office visits

## 2021-06-16 NOTE — TELEPHONE ENCOUNTER
I spoke to patient and the hospital told him that they wanted him to get another BMP done    Not sure when he is supposed to do it again

## 2021-06-17 NOTE — TELEPHONE ENCOUNTER
Called pt and advised him to have BMP checked one week and order placed    [employer]

## 2021-06-21 ENCOUNTER — APPOINTMENT (OUTPATIENT)
Dept: LAB | Facility: HOSPITAL | Age: 61
End: 2021-06-21
Attending: FAMILY MEDICINE
Payer: MEDICARE

## 2021-06-21 DIAGNOSIS — I50.31 ACUTE DIASTOLIC HEART FAILURE (HCC): ICD-10-CM

## 2021-06-21 DIAGNOSIS — I48.0 PAROXYSMAL ATRIAL FIBRILLATION (HCC): ICD-10-CM

## 2021-06-21 DIAGNOSIS — I10 ESSENTIAL HYPERTENSION: ICD-10-CM

## 2021-06-21 LAB
ANION GAP SERPL CALCULATED.3IONS-SCNC: 9 MMOL/L (ref 4–13)
BUN SERPL-MCNC: 39 MG/DL (ref 6–20)
CALCIUM SERPL-MCNC: 10.9 MG/DL (ref 8.4–10.2)
CHLORIDE SERPL-SCNC: 95 MMOL/L (ref 96–108)
CO2 SERPL-SCNC: 31 MMOL/L (ref 22–33)
CREAT SERPL-MCNC: 1.59 MG/DL (ref 0.5–1.2)
GFR SERPL CREATININE-BSD FRML MDRD: 46 ML/MIN/1.73SQ M
GLUCOSE P FAST SERPL-MCNC: 123 MG/DL (ref 70–105)
POTASSIUM SERPL-SCNC: 4.5 MMOL/L (ref 3.5–5)
SODIUM SERPL-SCNC: 135 MMOL/L (ref 133–145)

## 2021-06-21 PROCEDURE — 80048 BASIC METABOLIC PNL TOTAL CA: CPT

## 2021-06-21 PROCEDURE — 36415 COLL VENOUS BLD VENIPUNCTURE: CPT

## 2021-06-22 DIAGNOSIS — E83.52 SERUM CALCIUM ELEVATED: Primary | ICD-10-CM

## 2021-06-23 ENCOUNTER — OFFICE VISIT (OUTPATIENT)
Dept: CARDIOLOGY CLINIC | Facility: CLINIC | Age: 61
End: 2021-06-23
Payer: MEDICARE

## 2021-06-23 VITALS
WEIGHT: 315 LBS | HEIGHT: 67 IN | SYSTOLIC BLOOD PRESSURE: 108 MMHG | HEART RATE: 76 BPM | BODY MASS INDEX: 49.44 KG/M2 | DIASTOLIC BLOOD PRESSURE: 60 MMHG | OXYGEN SATURATION: 95 %

## 2021-06-23 DIAGNOSIS — I83.019 VENOUS STASIS ULCERS OF BOTH LOWER EXTREMITIES (HCC): ICD-10-CM

## 2021-06-23 DIAGNOSIS — I83.029 VENOUS STASIS ULCERS OF BOTH LOWER EXTREMITIES (HCC): ICD-10-CM

## 2021-06-23 DIAGNOSIS — I50.31 ACUTE DIASTOLIC HEART FAILURE (HCC): ICD-10-CM

## 2021-06-23 DIAGNOSIS — I48.0 PAROXYSMAL ATRIAL FIBRILLATION (HCC): ICD-10-CM

## 2021-06-23 DIAGNOSIS — E66.01 MORBID OBESITY (HCC): ICD-10-CM

## 2021-06-23 DIAGNOSIS — I50.32 CHRONIC DIASTOLIC HEART FAILURE (HCC): ICD-10-CM

## 2021-06-23 DIAGNOSIS — I10 ESSENTIAL HYPERTENSION: ICD-10-CM

## 2021-06-23 DIAGNOSIS — G47.33 OSA (OBSTRUCTIVE SLEEP APNEA): ICD-10-CM

## 2021-06-23 DIAGNOSIS — L97.919 VENOUS STASIS ULCERS OF BOTH LOWER EXTREMITIES (HCC): ICD-10-CM

## 2021-06-23 DIAGNOSIS — Z09 HOSPITAL DISCHARGE FOLLOW-UP: Primary | ICD-10-CM

## 2021-06-23 DIAGNOSIS — E78.2 MIXED HYPERLIPIDEMIA: ICD-10-CM

## 2021-06-23 DIAGNOSIS — R26.2 AMBULATORY DYSFUNCTION: ICD-10-CM

## 2021-06-23 DIAGNOSIS — L97.929 VENOUS STASIS ULCERS OF BOTH LOWER EXTREMITIES (HCC): ICD-10-CM

## 2021-06-23 PROCEDURE — 93000 ELECTROCARDIOGRAM COMPLETE: CPT | Performed by: NURSE PRACTITIONER

## 2021-06-23 PROCEDURE — 99215 OFFICE O/P EST HI 40 MIN: CPT | Performed by: NURSE PRACTITIONER

## 2021-06-23 RX ORDER — BUMETANIDE 2 MG/1
2 TABLET ORAL 2 TIMES DAILY
Qty: 60 TABLET | Refills: 3 | Status: SHIPPED | OUTPATIENT
Start: 2021-06-23 | End: 2021-07-14 | Stop reason: SDUPTHER

## 2021-06-23 RX ORDER — POTASSIUM CHLORIDE 20 MEQ/1
20 TABLET, EXTENDED RELEASE ORAL 2 TIMES DAILY
Qty: 60 TABLET | Refills: 1 | Status: SHIPPED | OUTPATIENT
Start: 2021-06-23 | End: 2021-07-16

## 2021-06-23 NOTE — LETTER
June 23, 2021     [address] [place] [address]    Patient: Torsten Jacobsen   YOB: 1960   Date of Visit: 6/23/2021       Dear Dr Naye White:    Thank you for referring Torsten Jacobsen to me for evaluation  Below are my notes for this consultation  If you have questions, please do not hesitate to call me  I look forward to following your patient along with you  Sincerely,        ARYAN Duarte        CC: MD Ainsley Tidwell, [address]  6/23/2021 10:19 AM  Sign when Signing Visit  Cardiology  Heart Failure   Follow Up Office Visit Note -     Torsten Jacobsen   61 y o    male   MRN: [id number]  [address]  [address] [id number] [phone]    PCP: Joaquim Ferguson MD  Cardiologist : Dr Myrna Mishra  Prior: Dr Tommy Leija             Summary of Recommendations  Low-sodium diet, Heart failure education as below  Taught how to read food labels  STOP Lisinopril -for blood pressure room  May be contributing to incr sCr  HYDRATE with 1800 cc/60 oz of fluid a day  He has been drinking less  His creatinine bumped to 1 59  Etiology unclear this may be in part due to hypoperfusion/ ACE-I  His weight is  Up 6 lb by our scale;he has increasing cough and shortness of breath, bibasilar crackles  Will increase Bumex to 2 mg b i d /Increase potassium to 20 mEq b i d   BMP next Tuesday  If worsening renal fxn, he may need to see nephr  Reconsider Rx for CASSIE  Recommend reestablishing care with bariatric team for weight loss treatment  He was working with [facility] Drs before  Follow up will be scheduled with Dr Myrna Mishra 2 months      Impression/plan  Acute on Chronic diastolic heart failure, recent adm for decompensation 6/8-6/15/21  Clinically his volume status is difficult to discern  However he is symptomatically volume overloaded: increased shortness of breath /increased cough    He tells me he feels like vomiting he coughs so much  He does have some bibasilar crackles  Still sleep in a chair  Wt Readings from Last 3 Encounters:   06/23/21 (!) 196 kg (432 lb)   06/15/21 (!) 193 kg (426 lb)   06/07/21 (!) 204 kg (450 lb)     --Beta-blocker:  Metoprolol tartrate 25 mg Q 12  --Diuretic:  Bumex 2 mg daily plus potassium 20 mEq daily  Prior to admission was on Lasix 20 mg/d    --> Increase Bumex to 2 mg b i d , potassium to 20 mEq b i d  BMP Monday June 28th  --ACE/ARB/ARNI:  Lisinopril 10 mg daily  STOP  --2 g sodium diet, 1800 cc fluid restriction  Daily weights, call weight gain 2-3 lb in 1 day or 5 lb in 5 days  atrial flutter, S/P ablation 4/2012  Hypertension, essential  /60 On lisinopril, metoprolol tartrate and loop diuretic  Monitor with changes  Will stop lisinopril and increase diuretic  Hyperlipidemia  On atorvastatin 20 mg/d  6/8/21:  LDL 60, non , at goal  Obstructive sleep apnea, untreated  Venous stasis bilateral lower extremities  TEDS; compliant  Morbid obesity, BMI 67-recommend re-evaluation by the bariatric team for weight loss help  Cardiac testing   TTE 6/8/21  EF 60%  No regional motion abnormality identified  Wall thickness mildly increased          HPI:   Stefano Maldonado is a 60 yo male with morbid obesity, untreated sleep apnea, HL, atrial flutter prior ablation April 2012, chronic venous stasis ulcers     An echocardiogram in 2018 showed preserved LV function  In the past he followed with Dr Anne Marie Yost from [place], more recently established care with Dr Orlando Morel 6/8-6/15/21 with volume overload  He was seen in the office by Cardiology, and had worsening symptoms of shortness of breath lower extremity edema PND despite up titration of diuretics  His dry weight is unknown  He was followed by Cardiology, diuresed with improved symptoms  His echocardiogram showed ejection fraction of 60% RV was not well visualized    He was discharged on Bumex 2 mg daily, previously Lasix 20 mg daily  His creatinine improved from 1 7 with diuresis to 1 18 at discharge, weight went from 426 lb  He was advised compliance with treatment for sleep apnea  Cardiology notes indicate he was maintaining sinus rhythm  Discharge weight :  426 lb  Discharge creatinine:  1 18  Discharge diuretics:  Bumex 2 mg daily plus potassium 20 mEq daily    Labs June 21, 2021 creatinine 1 59 potassium 4 5    6/23/21  Hospital follow-up heart failure  He is accompanied by his wife  He is in a wheelchair  He did get weight on our scale today  His weight appears to be up a few lb  He is symptomatically overloaded  He is still sleeping in a chair at home  He has increased moist cough  He has dyspnea on exertion  He has been adhering to a low-salt diet at home  Wt Readings from Last 3 Encounters:   06/23/21 (!) 196 kg (432 lb)   06/15/21 (!) 193 kg (426 lb)   06/07/21 (!) 204 kg (450 lb)   Labs June 21, 2021 creatinine 1 59 potassium 4 5  When he went into the hospital his creatinine was 0 94 when he was overloaded  With diuresis his creatinine bumped to 1 7  Diuretics were held then resumed  His discharge creatinine was 1 18, likely lower as a result of holding diuretics  Discharged on Bumex 2 mg daily  Previously Lasix 20 mg  His creatinine bumped to 1 59  He had been on lisinopril chronically; that is not new  Blood pressure today 108/60  Will stop lisinopril  Asked him to hydrate he is not drinking enough after we reviewed his intake  Increase Bumex to 2 mg b i d  with supplemental potassium  Blood work next week        I have spent 40 minutes with Patient and family today in which greater than 50% of this time was spent in counseling/coordination of care regarding Intructions for management, Patient and family education, Importance of tx compliance and Risk factor reductions       Assessment  Diagnoses and all orders for this visit:    Hospital discharge follow-up    Chronic diastolic heart failure ([facility] )    CASSIE (obstructive sleep apnea)    Essential hypertension    Paroxysmal atrial fibrillation (HCC)    Venous stasis ulcers of both lower extremities (HCC)    Ambulatory dysfunction    Mixed hyperlipidemia    Morbid obesity (HCC)        Past Medical History:   Diagnosis Date    Arthritis     Atrial fibrillation ([facility] )     Essential hypertension     Hyperlipidemia     Hypertension     Lower extremity edema     Obesity     CASSIE (obstructive sleep apnea)     Osteoarthritis of both knees     PSVT (paroxysmal supraventricular tachycardia) ([facility])     Vitamin D deficiency        Review of Systems   Constitutional: Positive for weight gain  Negative for chills  Cardiovascular: Positive for orthopnea  Negative for chest pain, claudication, cyanosis, dyspnea on exertion, irregular heartbeat, leg swelling, near-syncope, palpitations, paroxysmal nocturnal dyspnea and syncope  Respiratory: Positive for cough  Negative for shortness of breath  Gastrointestinal: Negative for heartburn and nausea  Neurological: Negative for dizziness, focal weakness, headaches, light-headedness and weakness  All other systems reviewed and are negative  No Known Allergies        Current Outpatient Medications:     aspirin (ECOTRIN LOW STRENGTH) 81 mg EC tablet, Take 81 mg by mouth daily, Disp: , Rfl:     atorvastatin (LIPITOR) 20 mg tablet, TAKE 1 TABLET BY MOUTH EVERYDAY AT BEDTIME, Disp: 90 tablet, Rfl: 0    bumetanide (BUMEX) 2 mg tablet, Take 1 tablet (2 mg total) by mouth daily, Disp: 30 tablet, Rfl: 0    calcium carbonate (TUMS) 500 mg chewable tablet, Chew 2 tablets (1,000 mg total) daily as needed for indigestion or heartburn, Disp: 30 tablet, Rfl: 0    lisinopril (ZESTRIL) 10 mg tablet, TAKE 1 TABLET BY MOUTH EVERY DAY, Disp: 90 tablet, Rfl: 2    metoprolol tartrate (LOPRESSOR) 25 mg tablet, Take 1 tablet (25 mg total) by mouth every 12 (twelve) hours, Disp: 60 tablet, Rfl: 0    potassium chloride (K-DUR,KLOR-CON) 20 mEq tablet, Take 1 tablet (20 mEq total) by mouth daily, Disp: 10 tablet, Rfl: 0    senna (SENOKOT) 8 6 mg, Take 1 tablet (8 6 mg total) by mouth daily, Disp: 30 tablet, Rfl: 0    Social History     Socioeconomic History    Marital status: /Civil Union     Spouse name: Not on file    Number of children: Not on file    Years of education: Not on file    Highest education level: Not on file   Occupational History    Not on file   Tobacco Use    Smoking status: Former Smoker     Packs/day: 1 00     Years: 15 00     Pack years: 15      Types: Cigarettes     Start date: 2006     Quit date: 2021     Years since quittin 4    Smokeless tobacco: Never Used   Vaping Use    Vaping Use: Never used   Substance and Sexual Activity    Alcohol use: Not Currently     Alcohol/week: 0 0 standard drinks     Comment: harry Funk Drug use: Never    Sexual activity: Not on file   Other Topics Concern    Not on file   Social History Narrative    Not on file     Social Determinants of Health     Financial Resource Strain:     Difficulty of Paying Living Expenses:    Food Insecurity:     Worried About Running Out of Food in the Last Year:     Ran Out of Food in the Last Year:    Transportation Needs:     Lack of Transportation (Medical):      Lack of Transportation (Non-Medical):    Physical Activity:     Days of Exercise per Week:     Minutes of Exercise per Session:    Stress:     Feeling of Stress :    Social Connections:     Frequency of Communication with Friends and Family:     Frequency of Social Gatherings with Friends and Family:     Attends Anabaptist Services:     Active Member of Clubs or Organizations:     Attends Club or Organization Meetings:     Marital Status:    Intimate Partner Violence:     Fear of Current or Ex-Partner:     Emotionally Abused:     Physically Abused:     Sexually Abused:        Family History   Problem Relation Age of Onset  Diabetes Mother     Alzheimer's disease Mother     Heart disease Father     Arthritis Father     Asthma Sister     No Known Problems Brother     No Known Problems Sister     No Known Problems Sister        Physical Exam  Vitals and nursing note reviewed  Constitutional:       General: He is not in acute distress  HENT:      Head: Normocephalic and atraumatic  Eyes:      Conjunctiva/sclera: Conjunctivae normal    Cardiovascular:      Rate and Rhythm: Normal rate and regular rhythm  Pulses: Intact distal pulses  Heart sounds: Normal heart sounds  Pulmonary:      Effort: Pulmonary effort is normal       Breath sounds: Rales present  Abdominal:      General: Bowel sounds are normal       Palpations: Abdomen is soft  Musculoskeletal:         General: Normal range of motion  Cervical back: Normal range of motion and neck supple  Comments: Wearing TEDS   Skin:     General: Skin is warm and dry  Neurological:      Mental Status: He is alert and oriented to person, place, and time  Vitals: There were no vitals taken for this visit     Wt Readings from Last 3 Encounters:   06/15/21 (!) 193 kg (426 lb)   21 (!) 204 kg (450 lb)   21 (!) 204 kg (450 lb)         Labs & Results:  Lab Results   Component Value Date    WBC 9 93 2021    HGB 12 6 2021    HCT 40 5 2021     (H) 2021     2021     No results found for: BNP  No components found for: CHEM    Results for orders placed during the hospital encounter of 21    Echo complete with contrast if indicated    Narrative  [facility], [address] [facility]  [phone]    Transthoracic Echocardiogram  2D, M-mode, Doppler, and Color Doppler    Study date:  2021    Patient: Dennis Cr  MR number: [id number]  Account number: [de-identified]  : 1960  Age: 61 years  Gender: Male  Status: Inpatient  Location: Bedside  Height: 66 in  Weight: 442 lb  BP: 106/ 58 mmHg    Indications: Heart failure  Diagnoses: I50 9 - Heart failure, unspecified    Sonographer:  DONELL Phan  Primary Physician:  Harvey Rivas MD  Referring Physician:  Juan Diego Gutierrez MD  Group:  [id number]  Interpreting Physician:  Jefferson Garcia DO    SUMMARY    PROCEDURE INFORMATION:  This was a technically difficult study despite administration of echo contrast     LEFT VENTRICLE:  Systolic function was normal  Ejection fraction was estimated to be 60 %  Although no diagnostic regional wall motion abnormality was identified, this possibility cannot be completely excluded on the basis of this study  Wall thickness was mildly increased  LEFT ATRIUM:  The atrium was dilated  HISTORY: PRIOR HISTORY: HTN, HLD, morbid obesity, CASSIE, PAF, former smoker  PROCEDURE: The procedure was performed at the bedside  This was a routine study  The transthoracic approach was used  The study included complete 2D imaging, M-mode, complete spectral Doppler, and color Doppler  The heart rate was 74 bpm,  at the start of the study  Images were obtained from the parasternal, apical, subcostal, and suprasternal notch acoustic windows  Intravenous contrast ( 1 0ml Definity in NSS) was administered to opacify the left ventricle  Echocardiographic views were limited due to poor acoustic window availability and decreased penetration  This was a technically difficult study despite administration of echo contrast     LEFT VENTRICLE: Size was normal  Systolic function was normal  Ejection fraction was estimated to be 60 %  Although no diagnostic regional wall motion abnormality was identified, this possibility cannot be completely excluded on the basis  of this study  Wall thickness was mildly increased  DOPPLER: The study was not technically sufficient to allow evaluation of LV diastolic function  RIGHT VENTRICLE: Not well visualized      LEFT ATRIUM: The atrium was dilated  Not well visualized  RIGHT ATRIUM: Not well visualized  MITRAL VALVE: Valve structure was normal  There was normal leaflet separation  DOPPLER: The transmitral velocity was within the normal range  There was no evidence for stenosis  There was no significant regurgitation  AORTIC VALVE: The valve was trileaflet  Leaflets exhibited normal thickness and normal cuspal separation  DOPPLER: Transaortic velocity was within the normal range  There was no evidence for stenosis  There was no regurgitation  TRICUSPID VALVE: The valve structure was normal  There was normal leaflet separation  DOPPLER: The transtricuspid velocity was within the normal range  There was no evidence for stenosis  There was trace regurgitation  The tricuspid jet  envelope definition was inadequate for estimation of RV systolic pressure  PULMONIC VALVE: Leaflets exhibited normal thickness, no calcification, and normal cuspal separation  DOPPLER: The transpulmonic velocity was within the normal range  There was trace regurgitation  PERICARDIUM: There was no pericardial effusion  The pericardium was normal in appearance  AORTA: The root exhibited normal size  SYSTEMIC VEINS: IVC: Not assessed  SYSTEM MEASUREMENT TABLES    2D  %FS: 27 26 %  Ao Diam: 3 82 cm  EDV(Teich): 205 8 ml  EF(Teich): 52 02 %  ESV(Teich): 98 74 ml  IVSd: 1 1 cm  LA Diam: 6 04 cm  LVIDd: 6 36 cm  LVIDs: 4 63 cm  LVPWd: 1 1 cm  SV(Teich): 107 06 ml    CW  AV Env  Ti: 262 61 ms  AV MaxP 73 mmHg  AV VTI: 30 33 cm  AV Vmax: 1 85 m/s  AV Vmean: 1 16 m/s  AV meanP 25 mmHg    PW  E' Sept: 0 08 m/s  E/E' Sept: 11 31  LVOT Env  Ti: 293 05 ms  LVOT VTI: 20 29 cm  LVOT Vmax: 0 99 m/s  LVOT Vmean: 0 69 m/s  LVOT maxPG: 3 93 mmHg  LVOT meanP 18 mmHg  MV A Heber: 0 81 m/s  MV Dec Whitman: 4 84 m/s2  MV DecT: 180 45 ms  MV E Heber: 0 87 m/s  MV E/A Ratio: 1 08  MV PHT: 52 33 ms  MVA By PHT: 4 2 cm2  PV Acc Whitman: 8 68 m/s2  PV AccT: 95 15 ms    [address] [facility]redited [facility]    Prepared and electronically signed by    Dinh Sewell DO  Signed 08-Jun-2021 17:34:37    No results found for this or any previous visit  This note was completed in part utilizing m-modal fluency direct voice recognition software  Grammatical errors, random word insertion, spelling mistakes, and incomplete sentences may be an occasional consequence of the system secondary to software limitations, ambient noise and hardware issues  At the time of dictation, efforts were made to edit, clarify and /or correct errors  Please read the chart carefully and recognize, using context, where substitutions have occurred    If you have any questions or concerns about the context, text or information contained within the body of this dictation, please contact myself, the provider, for further clarification

## 2021-06-25 ENCOUNTER — PATIENT OUTREACH (OUTPATIENT)
Dept: CASE MANAGEMENT | Facility: OTHER | Age: 61
End: 2021-06-25

## 2021-06-25 PROBLEM — I50.32 CHRONIC DIASTOLIC (CONGESTIVE) HEART FAILURE (HCC): Status: ACTIVE | Noted: 2021-06-25

## 2021-06-25 NOTE — PROGRESS NOTES
I spoke with patient and went over the medication changes mad at cardiology visit 6/23  He thinks the potassium is causing nausea so I advised trying to take it with food  atient increase Bumex and potassium as directed  His urine output has increased  At the office visit he was reeducated on heart failure diet and red flags  He has a PCP appointment and I advised him to take his medication list and weight record to that visit 6/28

## 2021-06-25 NOTE — PROGRESS NOTES
Assessment/Plan:         Problem List Items Addressed This Visit        Cardiovascular and Mediastinum    Essential hypertension - Primary     Ok now         Chronic diastolic (congestive) heart failure (HCC)     Wt Readings from Last 3 Encounters:   06/28/21 (!) 196 kg (432 lb)   06/23/21 (!) 196 kg (432 lb)   06/15/21 (!) 193 kg (426 lb)          reviewed cardiology note  and echo Now on metoprolol per cardiology         RESOLVED: Paroxysmal atrial fibrillation (HCC)       Musculoskeletal and Integument    Primary osteoarthritis of both knees     Requires walker         Venous stasis ulcers of both lower extremities ([address] )     Much better with compression stockings            Other    Mixed hyperlipidemia     On meds         Morbid obesity ([address] )     Pt seeing weight managment         Bilateral lower extremity edema     Better with diuresis and compression stockings         History of atrial fibrillation     Had ablation  About-15 yrs ago         Abnormal renal function     Monitoring creatinine on diuretics  Fine balance between fluid overload and volume depletion             TCM Call (since 5/28/2021)     Date and time call was made  6/16/2021 11:36 AM    Hospital care reviewed  Records reviewed    Patient was hospitialized at  [address] [place] [address]        Date of Admission  06/07/21    Date of discharge  06/15/21    Diagnosis  acute heart failure    Disposition  Home    Were the patients medications reviewed and updated  Yes    Current Symptoms  None      TCM Call (since 5/28/2021)     Post hospital issues  None    Should patient be enrolled in anticoag monitoring? No    Scheduled for follow up?   Yes    Patients specialists  Cardiologist    Did you obtain your prescribed medications  Yes    Do you need help managing your prescriptions or medications  No    Is transportation to your appointment needed  No    I have advised the patient to call PCP with any new or worsening symptoms  Kyle Dubin, MA Subjective: pt here for TCM admitted for CHF afib 6/7-6/15 now on bumex has htn leg edema ( better with compression stockings) dyslipidemia and CASSIE pt off lisinopril and now on metoprolol just saw cardiology and will see again in  2 months     Patient ID: Jurgen Miranda is a 61 y o  male  HPI    The following portions of the patient's history were reviewed and updated as appropriate:   Past Medical History:  He has a past medical history of Arthritis, Atrial fibrillation ([address] ), Essential hypertension, Hyperlipidemia, Hypertension, Lower extremity edema, Obesity, CASSIE (obstructive sleep apnea), Osteoarthritis of both knees, PSVT (paroxysmal supraventricular tachycardia) ([facility] [address] ), and Vitamin D deficiency  ,  _______________________________________________________________________  Medical Problems:  does not have any pertinent problems on file ,  _______________________________________________________________________  Past Surgical History:   has a past surgical history that includes Knee surgery and Atrial ablation surgery  ,  _______________________________________________________________________  Family History:  family history includes Alzheimer's disease in his mother; Arthritis in his father; Asthma in his sister; Diabetes in his mother; Heart disease in his father; No Known Problems in his brother, sister, and sister  ,  _______________________________________________________________________  Social History:   reports that he quit smoking about 5 months ago  His smoking use included cigarettes  He started smoking about 15 years ago  He has a 15 00 pack-year smoking history  He has never used smokeless tobacco  He reports previous alcohol use  He reports that he does not use drugs  ,  _______________________________________________________________________  Allergies:  has No Known Allergies     _______________________________________________________________________  Current Outpatient Medications   Medication Sig Dispense Refill    aspirin (ECOTRIN LOW STRENGTH) 81 mg EC tablet Take 81 mg by mouth daily      atorvastatin (LIPITOR) 20 mg tablet TAKE 1 TABLET BY MOUTH EVERYDAY AT BEDTIME 90 tablet 0    bumetanide (BUMEX) 2 mg tablet Take 1 tablet (2 mg total) by mouth 2 (two) times a day 60 tablet 3    calcium carbonate (TUMS) 500 mg chewable tablet Chew 2 tablets (1,000 mg total) daily as needed for indigestion or heartburn 30 tablet 0    metoprolol tartrate (LOPRESSOR) 25 mg tablet Take 1 tablet (25 mg total) by mouth every 12 (twelve) hours 60 tablet 0    potassium chloride (K-DUR,KLOR-CON) 20 mEq tablet Take 1 tablet (20 mEq total) by mouth 2 (two) times a day 60 tablet 1    senna (SENOKOT) 8 6 mg Take 1 tablet (8 6 mg total) by mouth daily 30 tablet 0     No current facility-administered medications for this visit      _______________________________________________________________________  Review of Systems   Constitutional: Negative for appetite change, chills, fatigue and fever  Respiratory: Negative for cough, chest tightness and shortness of breath  Cardiovascular: Positive for leg swelling (much better with compression stockings)  Negative for chest pain and palpitations  Gastrointestinal: Negative for abdominal pain, constipation, diarrhea, nausea and vomiting  Genitourinary: Negative for difficulty urinating and frequency  Musculoskeletal: Negative for arthralgias, back pain and neck pain  Skin: Negative for rash  Neurological: Negative for dizziness, weakness, light-headedness, numbness and headaches  Hematological: Does not bruise/bleed easily  Psychiatric/Behavioral: Negative for dysphoric mood and sleep disturbance  The patient is not nervous/anxious            Objective:  Vitals:    06/28/21 1028   BP: 112/70   BP Location: Left arm   Patient Position: Sitting   Cuff Size: Large   Pulse: 87   Resp: 20   Temp: (!) 97 °F (36 1 °C)   TempSrc: Temporal   SpO2: 96%   Weight: (!) 196 kg (432 lb)   Height: 5' 6 5" (1 689 m)     Body mass index is 68 68 kg/m²  Physical Exam  Vitals reviewed  Constitutional:       General: He is not in acute distress  Appearance: Normal appearance  He is well-developed  He is obese  He is not ill-appearing  HENT:      Mouth/Throat:      Mouth: Mucous membranes are moist    Eyes:      Extraocular Movements: Extraocular movements intact  Conjunctiva/sclera: Conjunctivae normal       Pupils: Pupils are equal, round, and reactive to light  Neck:      Thyroid: No thyromegaly  Vascular: No carotid bruit  Cardiovascular:      Rate and Rhythm: Normal rate and regular rhythm  Pulses: Normal pulses  Heart sounds: Normal heart sounds  No murmur heard  Pulmonary:      Effort: Pulmonary effort is normal       Breath sounds: Normal breath sounds  Chest:      Chest wall: No tenderness  Abdominal:      General: Bowel sounds are normal  There is no distension  Palpations: Abdomen is soft  Tenderness: There is no abdominal tenderness  Musculoskeletal:      Cervical back: Normal range of motion and neck supple  Right lower leg: No edema (compression stockings in place)  Left lower leg: No edema (compression stockings in place)  Lymphadenopathy:      Cervical: No cervical adenopathy  Skin:     General: Skin is warm and dry  Neurological:      General: No focal deficit present  Mental Status: He is alert and oriented to person, place, and time  Mental status is at baseline  Cranial Nerves: No cranial nerve deficit  Gait: Gait abnormal (requires  walker)     Psychiatric:         Mood and Affect: Mood normal          Behavior: Behavior normal

## 2021-06-28 ENCOUNTER — OFFICE VISIT (OUTPATIENT)
Dept: FAMILY MEDICINE CLINIC | Facility: CLINIC | Age: 61
End: 2021-06-28
Payer: MEDICARE

## 2021-06-28 VITALS
SYSTOLIC BLOOD PRESSURE: 112 MMHG | HEART RATE: 87 BPM | RESPIRATION RATE: 20 BRPM | BODY MASS INDEX: 49.44 KG/M2 | WEIGHT: 315 LBS | TEMPERATURE: 97 F | DIASTOLIC BLOOD PRESSURE: 70 MMHG | OXYGEN SATURATION: 96 % | HEIGHT: 67 IN

## 2021-06-28 DIAGNOSIS — M17.0 PRIMARY OSTEOARTHRITIS OF BOTH KNEES: ICD-10-CM

## 2021-06-28 DIAGNOSIS — I83.029 VENOUS STASIS ULCERS OF BOTH LOWER EXTREMITIES (HCC): ICD-10-CM

## 2021-06-28 DIAGNOSIS — Z86.79 HISTORY OF ATRIAL FIBRILLATION: ICD-10-CM

## 2021-06-28 DIAGNOSIS — I83.019 VENOUS STASIS ULCERS OF BOTH LOWER EXTREMITIES (HCC): ICD-10-CM

## 2021-06-28 DIAGNOSIS — E78.2 MIXED HYPERLIPIDEMIA: ICD-10-CM

## 2021-06-28 DIAGNOSIS — I10 ESSENTIAL HYPERTENSION: Primary | ICD-10-CM

## 2021-06-28 DIAGNOSIS — I50.32 CHRONIC DIASTOLIC (CONGESTIVE) HEART FAILURE (HCC): ICD-10-CM

## 2021-06-28 DIAGNOSIS — E66.01 MORBID OBESITY (HCC): ICD-10-CM

## 2021-06-28 DIAGNOSIS — N28.9 ABNORMAL RENAL FUNCTION: ICD-10-CM

## 2021-06-28 DIAGNOSIS — I48.0 PAROXYSMAL ATRIAL FIBRILLATION (HCC): ICD-10-CM

## 2021-06-28 DIAGNOSIS — R60.0 BILATERAL LOWER EXTREMITY EDEMA: ICD-10-CM

## 2021-06-28 DIAGNOSIS — L97.929 VENOUS STASIS ULCERS OF BOTH LOWER EXTREMITIES (HCC): ICD-10-CM

## 2021-06-28 DIAGNOSIS — L97.919 VENOUS STASIS ULCERS OF BOTH LOWER EXTREMITIES (HCC): ICD-10-CM

## 2021-06-28 PROCEDURE — 99495 TRANSJ CARE MGMT MOD F2F 14D: CPT | Performed by: FAMILY MEDICINE

## 2021-06-28 NOTE — ASSESSMENT & PLAN NOTE
Wt Readings from Last 3 Encounters:   06/28/21 (!) 196 kg (432 lb)   06/23/21 (!) 196 kg (432 lb)   06/15/21 (!) 193 kg (426 lb)          reviewed cardiology note  and echo Now on metoprolol per cardiology

## 2021-07-02 ENCOUNTER — PATIENT OUTREACH (OUTPATIENT)
Dept: CASE MANAGEMENT | Facility: OTHER | Age: 61
End: 2021-07-02

## 2021-07-09 ENCOUNTER — PATIENT OUTREACH (OUTPATIENT)
Dept: CASE MANAGEMENT | Facility: OTHER | Age: 61
End: 2021-07-09

## 2021-07-12 DIAGNOSIS — Z76.0 ENCOUNTER FOR ISSUE OF REPEAT PRESCRIPTION: ICD-10-CM

## 2021-07-12 RX ORDER — MELOXICAM 15 MG/1
TABLET ORAL
Qty: 90 TABLET | Refills: 2 | Status: SHIPPED | OUTPATIENT
Start: 2021-07-12 | End: 2021-10-26 | Stop reason: ALTCHOICE

## 2021-07-12 RX ORDER — ATORVASTATIN CALCIUM 20 MG/1
TABLET, FILM COATED ORAL
Qty: 90 TABLET | Refills: 0 | Status: SHIPPED | OUTPATIENT
Start: 2021-07-12 | End: 2021-07-18

## 2021-07-14 DIAGNOSIS — I50.31 ACUTE DIASTOLIC HEART FAILURE (HCC): ICD-10-CM

## 2021-07-14 RX ORDER — BUMETANIDE 2 MG/1
2 TABLET ORAL 2 TIMES DAILY
Qty: 180 TABLET | Refills: 1 | Status: SHIPPED | OUTPATIENT
Start: 2021-07-14 | End: 2022-01-24

## 2021-07-16 DIAGNOSIS — I50.31 ACUTE DIASTOLIC HEART FAILURE (HCC): ICD-10-CM

## 2021-07-16 RX ORDER — POTASSIUM CHLORIDE 1500 MG/1
TABLET, EXTENDED RELEASE ORAL
Qty: 180 TABLET | Refills: 1 | Status: SHIPPED | OUTPATIENT
Start: 2021-07-16 | End: 2022-01-12 | Stop reason: SDUPTHER

## 2021-07-18 DIAGNOSIS — Z76.0 ENCOUNTER FOR ISSUE OF REPEAT PRESCRIPTION: ICD-10-CM

## 2021-07-18 RX ORDER — ATORVASTATIN CALCIUM 20 MG/1
TABLET, FILM COATED ORAL
Qty: 90 TABLET | Refills: 0 | Status: SHIPPED | OUTPATIENT
Start: 2021-07-18 | End: 2022-01-10

## 2021-07-23 ENCOUNTER — PATIENT OUTREACH (OUTPATIENT)
Dept: CASE MANAGEMENT | Facility: OTHER | Age: 61
End: 2021-07-23

## 2021-07-23 ENCOUNTER — TELEPHONE (OUTPATIENT)
Dept: FAMILY MEDICINE CLINIC | Facility: CLINIC | Age: 61
End: 2021-07-23

## 2021-07-23 NOTE — TELEPHONE ENCOUNTER
Patient started the Potassium but he thinks it's making him constipated  Wants to know what he could do for the constipation

## 2021-08-06 ENCOUNTER — PATIENT OUTREACH (OUTPATIENT)
Dept: CASE MANAGEMENT | Facility: OTHER | Age: 61
End: 2021-08-06

## 2021-08-06 NOTE — PROGRESS NOTES
Unable to reach patient after multiple attempts  Patient and I spoke post discharge on two outreaches  Chart reviewed

## 2021-08-27 ENCOUNTER — PATIENT OUTREACH (OUTPATIENT)
Dept: CASE MANAGEMENT | Facility: OTHER | Age: 61
End: 2021-08-27

## 2021-08-27 NOTE — PROGRESS NOTES
Chart reviewed  Patient cancelled a cardiology appointment scheduled 7/26  Next scheduled appointment is with PCP 10/28

## 2021-09-13 ENCOUNTER — EPISODE CHANGES (OUTPATIENT)
Dept: CASE MANAGEMENT | Facility: OTHER | Age: 61
End: 2021-09-13

## 2021-09-19 ENCOUNTER — APPOINTMENT (EMERGENCY)
Dept: RADIOLOGY | Facility: HOSPITAL | Age: 61
End: 2021-09-19
Payer: MEDICARE

## 2021-09-19 ENCOUNTER — HOSPITAL ENCOUNTER (EMERGENCY)
Facility: HOSPITAL | Age: 61
Discharge: HOME | End: 2021-09-19
Attending: EMERGENCY MEDICINE | Admitting: EMERGENCY MEDICINE
Payer: MEDICARE

## 2021-09-19 VITALS
OXYGEN SATURATION: 96 % | SYSTOLIC BLOOD PRESSURE: 142 MMHG | TEMPERATURE: 98.6 F | DIASTOLIC BLOOD PRESSURE: 79 MMHG | BODY MASS INDEX: 67.57 KG/M2 | WEIGHT: 315 LBS | HEART RATE: 78 BPM | RESPIRATION RATE: 16 BRPM

## 2021-09-19 DIAGNOSIS — I87.2 CHRONIC VENOUS STASIS DERMATITIS OF BOTH LOWER EXTREMITIES: Primary | ICD-10-CM

## 2021-09-19 LAB
ALBUMIN SERPL BCP-MCNC: 3 G/DL (ref 3.5–5)
ALP SERPL-CCNC: 105 U/L (ref 46–116)
ALT SERPL W P-5'-P-CCNC: 15 U/L (ref 12–78)
ANION GAP SERPL CALCULATED.3IONS-SCNC: 8 MMOL/L (ref 4–13)
AST SERPL W P-5'-P-CCNC: 13 U/L (ref 5–45)
BASOPHILS # BLD AUTO: 0.06 THOUSANDS/ΜL (ref 0–0.1)
BASOPHILS NFR BLD AUTO: 0 % (ref 0–1)
BILIRUB SERPL-MCNC: 0.56 MG/DL (ref 0.2–1)
BUN SERPL-MCNC: 15 MG/DL (ref 5–25)
CALCIUM ALBUM COR SERPL-MCNC: 9.7 MG/DL (ref 8.3–10.1)
CALCIUM SERPL-MCNC: 8.9 MG/DL (ref 8.3–10.1)
CHLORIDE SERPL-SCNC: 96 MMOL/L (ref 100–108)
CO2 SERPL-SCNC: 30 MMOL/L (ref 21–32)
CREAT SERPL-MCNC: 1.04 MG/DL (ref 0.6–1.3)
EOSINOPHIL # BLD AUTO: 0.17 THOUSAND/ΜL (ref 0–0.61)
EOSINOPHIL NFR BLD AUTO: 1 % (ref 0–6)
ERYTHROCYTE [DISTWIDTH] IN BLOOD BY AUTOMATED COUNT: 13 % (ref 11.6–15.1)
GFR SERPL CREATININE-BSD FRML MDRD: 78 ML/MIN/1.73SQ M
GLUCOSE SERPL-MCNC: 136 MG/DL (ref 65–140)
HCT VFR BLD AUTO: 40.1 % (ref 36.5–49.3)
HGB BLD-MCNC: 12.9 G/DL (ref 12–17)
IMM GRANULOCYTES # BLD AUTO: 0.16 THOUSAND/UL (ref 0–0.2)
IMM GRANULOCYTES NFR BLD AUTO: 1 % (ref 0–2)
LYMPHOCYTES # BLD AUTO: 1.67 THOUSANDS/ΜL (ref 0.6–4.47)
LYMPHOCYTES NFR BLD AUTO: 12 % (ref 14–44)
MCH RBC QN AUTO: 31.4 PG (ref 26.8–34.3)
MCHC RBC AUTO-ENTMCNC: 32.2 G/DL (ref 31.4–37.4)
MCV RBC AUTO: 98 FL (ref 82–98)
MONOCYTES # BLD AUTO: 0.86 THOUSAND/ΜL (ref 0.17–1.22)
MONOCYTES NFR BLD AUTO: 6 % (ref 4–12)
NEUTROPHILS # BLD AUTO: 10.86 THOUSANDS/ΜL (ref 1.85–7.62)
NEUTS SEG NFR BLD AUTO: 80 % (ref 43–75)
NRBC BLD AUTO-RTO: 0 /100 WBCS
NT-PROBNP SERPL-MCNC: 310 PG/ML
PLATELET # BLD AUTO: 356 THOUSANDS/UL (ref 149–390)
PMV BLD AUTO: 9.5 FL (ref 8.9–12.7)
POTASSIUM SERPL-SCNC: 3.2 MMOL/L (ref 3.5–5.3)
PROT SERPL-MCNC: 8.1 G/DL (ref 6.4–8.2)
RBC # BLD AUTO: 4.11 MILLION/UL (ref 3.88–5.62)
SODIUM SERPL-SCNC: 134 MMOL/L (ref 136–145)
TROPONIN I SERPL-MCNC: <0.02 NG/ML
WBC # BLD AUTO: 13.78 THOUSAND/UL (ref 4.31–10.16)

## 2021-09-19 PROCEDURE — 80053 COMPREHEN METABOLIC PANEL: CPT | Performed by: EMERGENCY MEDICINE

## 2021-09-19 PROCEDURE — 36415 COLL VENOUS BLD VENIPUNCTURE: CPT | Performed by: EMERGENCY MEDICINE

## 2021-09-19 PROCEDURE — 99283 EMERGENCY DEPT VISIT LOW MDM: CPT

## 2021-09-19 PROCEDURE — 84484 ASSAY OF TROPONIN QUANT: CPT | Performed by: EMERGENCY MEDICINE

## 2021-09-19 PROCEDURE — 99285 EMERGENCY DEPT VISIT HI MDM: CPT | Performed by: EMERGENCY MEDICINE

## 2021-09-19 PROCEDURE — 83880 ASSAY OF NATRIURETIC PEPTIDE: CPT | Performed by: EMERGENCY MEDICINE

## 2021-09-19 PROCEDURE — 85025 COMPLETE CBC W/AUTO DIFF WBC: CPT | Performed by: EMERGENCY MEDICINE

## 2021-09-19 PROCEDURE — 96375 TX/PRO/DX INJ NEW DRUG ADDON: CPT

## 2021-09-19 PROCEDURE — 96374 THER/PROPH/DIAG INJ IV PUSH: CPT

## 2021-09-19 PROCEDURE — 71045 X-RAY EXAM CHEST 1 VIEW: CPT

## 2021-09-19 RX ORDER — KETOROLAC TROMETHAMINE 30 MG/ML
15 INJECTION, SOLUTION INTRAMUSCULAR; INTRAVENOUS ONCE
Status: COMPLETED | OUTPATIENT
Start: 2021-09-19 | End: 2021-09-19

## 2021-09-19 RX ORDER — CEPHALEXIN 500 MG/1
500 CAPSULE ORAL EVERY 6 HOURS SCHEDULED
Qty: 28 CAPSULE | Refills: 0 | Status: SHIPPED | OUTPATIENT
Start: 2021-09-19 | End: 2021-09-26

## 2021-09-19 RX ORDER — OXYCODONE HYDROCHLORIDE AND ACETAMINOPHEN 5; 325 MG/1; MG/1
1 TABLET ORAL EVERY 6 HOURS PRN
Qty: 20 TABLET | Refills: 0 | Status: SHIPPED | OUTPATIENT
Start: 2021-09-19 | End: 2021-09-29

## 2021-09-19 RX ORDER — FUROSEMIDE 10 MG/ML
40 INJECTION INTRAMUSCULAR; INTRAVENOUS ONCE
Status: COMPLETED | OUTPATIENT
Start: 2021-09-19 | End: 2021-09-19

## 2021-09-19 RX ORDER — POTASSIUM CHLORIDE 20 MEQ/1
20 TABLET, EXTENDED RELEASE ORAL ONCE
Status: COMPLETED | OUTPATIENT
Start: 2021-09-19 | End: 2021-09-19

## 2021-09-19 RX ADMIN — FUROSEMIDE 40 MG: 10 INJECTION, SOLUTION INTRAMUSCULAR; INTRAVENOUS at 15:17

## 2021-09-19 RX ADMIN — POTASSIUM CHLORIDE 20 MEQ: 1500 TABLET, EXTENDED RELEASE ORAL at 15:17

## 2021-09-19 RX ADMIN — KETOROLAC TROMETHAMINE 15 MG: 30 INJECTION, SOLUTION INTRAMUSCULAR; INTRAVENOUS at 15:17

## 2021-09-19 NOTE — DISCHARGE INSTRUCTIONS
Please follow up with wound care  Take prescribed medication until all gone  Return if symptoms worsening or you have any of the symptoms listed above different from your baseline  We are always here and always happy to re-evaluate!

## 2021-09-23 ENCOUNTER — OFFICE VISIT (OUTPATIENT)
Dept: WOUND CARE | Facility: HOSPITAL | Age: 61
End: 2021-09-23
Payer: MEDICARE

## 2021-09-23 DIAGNOSIS — E66.01 MORBID OBESITY (HCC): ICD-10-CM

## 2021-09-23 DIAGNOSIS — L97.218 NON-PRESSURE CHRONIC ULCER OF RIGHT CALF WITH OTHER SPECIFIED SEVERITY (HCC): ICD-10-CM

## 2021-09-23 DIAGNOSIS — I89.0 LYMPHEDEMA OF BOTH LOWER EXTREMITIES: ICD-10-CM

## 2021-09-23 DIAGNOSIS — I50.32 CHRONIC DIASTOLIC (CONGESTIVE) HEART FAILURE (HCC): ICD-10-CM

## 2021-09-23 DIAGNOSIS — R26.2 AMBULATORY DYSFUNCTION: ICD-10-CM

## 2021-09-23 DIAGNOSIS — L97.919 CHRONIC VENOUS HYPERTENSION (IDIOPATHIC) WITH ULCER OF RIGHT LOWER EXTREMITY (HCC): ICD-10-CM

## 2021-09-23 DIAGNOSIS — L97.228 NON-PRESSURE CHRONIC ULCER OF LEFT CALF WITH OTHER SPECIFIED SEVERITY (HCC): Primary | ICD-10-CM

## 2021-09-23 DIAGNOSIS — I87.311 CHRONIC VENOUS HYPERTENSION (IDIOPATHIC) WITH ULCER OF RIGHT LOWER EXTREMITY (HCC): ICD-10-CM

## 2021-09-23 DIAGNOSIS — I87.312 CHRONIC VENOUS HYPERTENSION (IDIOPATHIC) WITH ULCER OF LEFT LOWER EXTREMITY (HCC): ICD-10-CM

## 2021-09-23 DIAGNOSIS — L97.929 CHRONIC VENOUS HYPERTENSION (IDIOPATHIC) WITH ULCER OF LEFT LOWER EXTREMITY (HCC): ICD-10-CM

## 2021-09-23 PROCEDURE — 87186 SC STD MICRODIL/AGAR DIL: CPT | Performed by: PREVENTIVE MEDICINE

## 2021-09-23 PROCEDURE — 99213 OFFICE O/P EST LOW 20 MIN: CPT | Performed by: PREVENTIVE MEDICINE

## 2021-09-23 PROCEDURE — 87205 SMEAR GRAM STAIN: CPT | Performed by: PREVENTIVE MEDICINE

## 2021-09-23 PROCEDURE — 87070 CULTURE OTHR SPECIMN AEROBIC: CPT | Performed by: PREVENTIVE MEDICINE

## 2021-09-23 PROCEDURE — 87077 CULTURE AEROBIC IDENTIFY: CPT | Performed by: PREVENTIVE MEDICINE

## 2021-09-23 RX ORDER — LIDOCAINE HYDROCHLORIDE 40 MG/ML
5 SOLUTION TOPICAL ONCE
Status: COMPLETED | OUTPATIENT
Start: 2021-09-23 | End: 2021-09-23

## 2021-09-23 RX ADMIN — LIDOCAINE HYDROCHLORIDE 5 ML: 40 SOLUTION TOPICAL at 08:26

## 2021-09-23 NOTE — PATIENT INSTRUCTIONS
Orders Placed This Encounter   Procedures    Wound cleansing and dressings     Right and left leg wound  Wash your hands with soap and water  Remove old dressing, discard into plastic bag and place in trash  Cleanse the wound with PROPHASE wash every other day (soap and water on other days) prior to applying a clean dressing  Do not use tissue or cotton balls  Do not scrub the wound  Pat dry using gauze  Shower yes   Apply Maxorb to the wound  Cover with ABD pad or diapers  Secure with rolled gauze and tape  Change dressing daily  If dressings are soaking through early, replace just ABD pads on top  And secure again with rolled gauze and tape  This was done today  Standing Status:   Future     Standing Expiration Date:   9/23/2022    Wound compression and edema control     Elastic Tubular Stocking - spandagrip G to both legs    Tubular elastic bandage: Apply from base of toes to behind the knee  Apply in AM, may remove for sleep  Avoid prolonged standing in one place  Elevate leg(s) above the level of the heart when sitting or as much as possible       Standing Status:   Future     Standing Expiration Date:   9/23/2022

## 2021-09-23 NOTE — PROGRESS NOTES
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)  -     lidocaine (XYLOCAINE) 4 % topical solution 5 mL  -     Wound cleansing and dressings; Future  -     Wound compression and edema control; Future    2  Non-pressure chronic ulcer of right calf with other specified severity (HCC)  -     lidocaine (XYLOCAINE) 4 % topical solution 5 mL  -     Wound cleansing and dressings; Future  -     Wound compression and edema control; Future  -     Wound culture and Gram stain    3  Lymphedema of both lower extremities    4  Chronic venous hypertension (idiopathic) with ulcer of left lower extremity (HCC)    5  Chronic venous hypertension (idiopathic) with ulcer of right lower extremity (HCC)    6  Ambulatory dysfunction    7  Morbid obesity ([address] )    8  Chronic diastolic (congestive) heart failure (HCC)        Diagnosis ICD-10-CM Associated Orders   1  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)  L97 228 lidocaine (XYLOCAINE) 4 % topical solution 5 mL     Wound cleansing and dressings     Wound compression and edema control   2  Non-pressure chronic ulcer of right calf with other specified severity (HCC)  L97 218 lidocaine (XYLOCAINE) 4 % topical solution 5 mL     Wound cleansing and dressings     Wound compression and edema control     Wound culture and Gram stain   3  Lymphedema of both lower extremities  I89 0    4  Chronic venous hypertension (idiopathic) with ulcer of left lower extremity (HCC)  I87 312     L97 929    5  Chronic venous hypertension (idiopathic) with ulcer of right lower extremity (HCC)  I87 311     L97 919    6  Ambulatory dysfunction  R26 2    7  Morbid obesity ([address] )  E66 01    8   Chronic diastolic (congestive) heart failure (HCC) I50 32           Assessment & Plan:  See wound orders  Reviewed ER records, treated with cephalexin  Culture taken today  Counseled on importance of compression, frequent elevation of legs, and weightloss  Is sleeping in recliner, needs to elevate legs  Recommend tylenol for pain  Once drainage is controlled plan for compression wraps  Will be very difficult to heal with his comorbidities putting him at moderate risk for complicaitons      Subjective:   Presents with ulcers of b/l LE's  Ongoing issues with edema for years  Has worn compression stockings, not currently due to pain  No fever  A lot of drainage  Went to ER and treated with abx   Was seen in the past at [facility] wound center      The following portions of the patient's history were reviewed and updated as appropriate:   Patient Active Problem List   Diagnosis    Primary osteoarthritis of both knees    Essential hypertension    Mixed hyperlipidemia    Morbid obesity ([address] )    CASSIE (obstructive sleep apnea)    Vitamin D deficiency    Ambulatory dysfunction    Bilateral lower extremity edema    Venous stasis ulcers of both lower extremities ([address] )    Former smoker    Edema of left upper arm    Acute diastolic heart failure ([address] )    GENOVEVA (acute kidney injury) ([address] )    Chronic diastolic (congestive) heart failure (HCC)    History of atrial fibrillation    Abnormal renal function     Past Medical History:   Diagnosis Date    Arthritis     Atrial fibrillation ([address] )     Essential hypertension     Hyperlipidemia     Hypertension     Lower extremity edema     Obesity     CASSIE (obstructive sleep apnea)     Osteoarthritis of both knees     PSVT (paroxysmal supraventricular tachycardia) ([address] )     Vitamin D deficiency      Past Surgical History:   Procedure Laterality Date    ATRIAL ABLATION SURGERY      KNEE SURGERY       Social History     Socioeconomic History    Marital status: /Civil Union     Spouse name: None    Number of children: None    Years of education: 15    Highest education level: 12th grade   Occupational History    None   Tobacco Use    Smoking status: Former Smoker     Packs/day: 1 00     Years: 15 00     Pack years: 15 00     Types: Cigarettes     Start date: 2006     Quit date: 2021     Years since quittin 6    Smokeless tobacco: Never Used   Vaping Use    Vaping Use: Never used   Substance and Sexual Activity    Alcohol use: Not Currently     Alcohol/week: 0 0 standard drinks     Comment: occ per [employer] Drug use: Never    Sexual activity: None   Other Topics Concern    None   Social History Narrative    None     Social Determinants of Health     Financial Resource Strain:     Difficulty of Paying Living Expenses:    Food Insecurity:     Worried About Running Out of Food in the Last Year:     Ran Out of Food in the Last Year:    Transportation Needs:     Lack of Transportation (Medical):      Lack of Transportation (Non-Medical):    Physical Activity:     Days of Exercise per Week:     Minutes of Exercise per Session:    Stress:     Feeling of Stress :    Social Connections:     Frequency of Communication with Friends and Family:     Frequency of Social Gatherings with Friends and Family:     Attends Yarsanism Services:     Active Member of Clubs or Organizations:     Attends Club or Organization Meetings:     Marital Status:    Intimate Partner Violence:     Fear of Current or Ex-Partner:     Emotionally Abused:     Physically Abused:     Sexually Abused:         Current Outpatient Medications:     aspirin (ECOTRIN LOW STRENGTH) 81 mg EC tablet, Take 81 mg by mouth daily, Disp: , Rfl:     atorvastatin (LIPITOR) 20 mg tablet, TAKE 1 TABLET BY MOUTH EVERYDAY AT BEDTIME, Disp: 90 tablet, Rfl: 0    bumetanide (BUMEX) 2 mg tablet, Take 1 tablet (2 mg total) by mouth 2 (two) times a day, Disp: 180 tablet, Rfl: 1    calcium carbonate (TUMS) 500 mg chewable tablet, Chew 2 tablets (1,000 mg total) daily as needed for indigestion or heartburn, Disp: 30 tablet, Rfl: 0    cephalexin (KEFLEX) 500 mg capsule, Take 1 capsule (500 mg total) by mouth every 6 (six) hours for 7 days, Disp: 28 capsule, Rfl: 0    Klor-Con M20 20 MEQ tablet, TAKE 1 TABLET (20 MEQ TOTAL) BY MOUTH 2 (TWO) TIMES A DAY, Disp: 180 tablet, Rfl: 1    metoprolol tartrate (LOPRESSOR) 25 mg tablet, Take 1 tablet (25 mg total) by mouth every 12 (twelve) hours, Disp: 180 tablet, Rfl: 1    oxyCODONE-acetaminophen (PERCOCET) 5-325 mg per tablet, Take 1 tablet by mouth every 6 (six) hours as needed for moderate pain or severe pain for up to 10 daysMax Daily Amount: 4 tablets, Disp: 20 tablet, Rfl: 0    senna (SENOKOT) 8 6 mg, Take 1 tablet (8 6 mg total) by mouth daily, Disp: 30 tablet, Rfl: 0    meloxicam (MOBIC) 15 mg tablet, TAKE 1 TABLET BY MOUTH EVERY DAY, Disp: 90 tablet, Rfl: 2  No current facility-administered medications for this visit  Family History   Problem Relation Age of Onset    Diabetes Mother     Alzheimer's disease Mother     Heart disease Father     Arthritis Father     Asthma Sister     No Known Problems Brother     No Known Problems Sister     No Known Problems Sister       Review of Systems   Constitutional: Negative  Respiratory: Negative  Cardiovascular: Positive for leg swelling  Musculoskeletal: Positive for gait problem  Skin: Positive for wound  Objective: There were no vitals taken for this visit  Physical Exam  Vitals reviewed  Constitutional:       General: He is not in acute distress  Appearance: He is obese  Cardiovascular:      Rate and Rhythm: Normal rate  Pulmonary:      Effort: Pulmonary effort is normal    Musculoskeletal:      Right lower leg: Edema present  Left lower leg: Edema present  Skin:     Comments: See wound assessment   Neurological:      General: No focal deficit present        Mental Status: He is alert and oriented to person, place, and days) prior to applying a clean dressing  Do not use tissue or cotton balls  Do not scrub the wound  Pat dry using gauze  Shower yes   Apply Maxorb to the wound  Cover with ABD pad or diapers  Secure with rolled gauze and tape  Change dressing daily  If dressings are soaking through early, replace just ABD pads on top  And secure again with rolled gauze and tape  This was done today  Standing Status:   Future     Standing Expiration Date:   9/23/2022    Wound compression and edema control     Elastic Tubular Stocking - spandagrip G to both legs    Tubular elastic bandage: Apply from base of toes to behind the knee  Apply in AM, may remove for sleep  Avoid prolonged standing in one place  Elevate leg(s) above the level of the heart when sitting or as much as possible  Standing Status:   Future     Standing Expiration Date:   9/23/2022    Wound culture and Gram stain     Order Specific Question:   Release to patient through Mychart     Answer:   Aura Barrientos DO    Portions of the record may have been created with voice recognition software  Occasional wrong word or "sound a like" substitutions may have occurred due to the inherent limitations of voice recognition software  Read the chart carefully and recognize, using context, where substitutions have occurred

## 2021-09-24 NOTE — ED PROVIDER NOTES
History  Chief Complaint   Patient presents with    Leg Pain     per pt "he has been having leg pain with some blisters and water leaking out of them, denies anysob, fevers, chest pain  "     60 yo male with h/o CHF p/w bilateral symmetric LE pain/swelling  Denies f/c/n/v/d  Denies changes in baseline SOB or exercise tolerance  Dnies chest pain  Reports h/o ulcers on legs that required prolonged care prior to improvement in past   This is similar  History provided by:  Patient, medical records and significant other   used: No    Leg Pain  Location:  Leg  Time since incident:  1 week  Injury: no    Leg location:  L leg, R leg, L lower leg and R lower leg  Pain details:     Quality:  Burning and aching    Radiates to:  Does not radiate    Severity:  Moderate    Onset quality:  Gradual    Duration:  1 week    Timing:  Constant    Progression:  Worsening  Chronicity:  New  Foreign body present:  No foreign bodies  Tetanus status:  Unknown  Prior injury to area:  No  Relieved by:  Nothing  Worsened by:  Nothing  Ineffective treatments:  None tried  Associated symptoms: swelling    Associated symptoms: no back pain, no fever, no numbness and no tingling    Risk factors: obesity    Risk factors: no recent illness        Prior to Admission Medications   Prescriptions Last Dose Informant Patient Reported? Taking?    Klor-Con M20 20 MEQ tablet   No No   Sig: TAKE 1 TABLET (20 MEQ TOTAL) BY MOUTH 2 (TWO) TIMES A DAY   aspirin (ECOTRIN LOW STRENGTH) 81 mg EC tablet  Self Yes No   Sig: Take 81 mg by mouth daily   atorvastatin (LIPITOR) 20 mg tablet   No No   Sig: TAKE 1 TABLET BY MOUTH EVERYDAY AT BEDTIME   bumetanide (BUMEX) 2 mg tablet   No No   Sig: Take 1 tablet (2 mg total) by mouth 2 (two) times a day   calcium carbonate (TUMS) 500 mg chewable tablet  Self No No   Sig: Chew 2 tablets (1,000 mg total) daily as needed for indigestion or heartburn   meloxicam (MOBIC) 15 mg tablet   No No   Sig: TAKE 1 TABLET BY MOUTH EVERY DAY   metoprolol tartrate (LOPRESSOR) 25 mg tablet   No No   Sig: Take 1 tablet (25 mg total) by mouth every 12 (twelve) hours   senna (SENOKOT) 8 6 mg  Self No No   Sig: Take 1 tablet (8 6 mg total) by mouth daily      Facility-Administered Medications: None       Past Medical History:   Diagnosis Date    Arthritis     Atrial fibrillation ([address] )     Essential hypertension     Hyperlipidemia     Hypertension     Lower extremity edema     Obesity     CASSIE (obstructive sleep apnea)     Osteoarthritis of both knees     [facility]ox[facility]     Vitamin D deficiency        Past Surgical History:   Procedure Laterality Date    ATRIAL ABLATION SURGERY      KNEE SURGERY         Family History   Problem Relation Age of Onset    Diabetes Mother     Alzheimer's disease Mother     Heart disease Father     Arthritis Father     Asthma Sister     No Known Problems Brother     No Known Problems Sister     No Known Problems Sister      I have reviewed and agree with the history as documented  E-Cigarette/Vaping    E-Cigarette Use Never User      E-Cigarette/Vaping Substances    Nicotine No     THC No     CBD No     Flavoring No     Other No     Unknown No      Social History     Tobacco Use    Smoking status: Former Smoker     Packs/day: 1 00     Years: 15 00     Pack years: 15 00     Types: Cigarettes     Start date: 2006     Quit date: 2021     Years since quittin 6    Smokeless tobacco: Never Used   Vaping Use    Vaping Use: Never used   Substance Use Topics    Alcohol use: Not Currently     Alcohol/week: 0 0 standard drinks     Comment: occ per [employer] Drug use: Never       Review of Systems   Constitutional: Negative for chills and fever  HENT: Negative for rhinorrhea and sore throat  Eyes: Negative for visual disturbance  Respiratory: Positive for shortness of breath (unchanged from baseline)   Negative for chest tightness  Cardiovascular: Positive for leg swelling  Negative for chest pain  Gastrointestinal: Negative for diarrhea, nausea and vomiting  Musculoskeletal: Negative for back pain  Skin: Positive for wound  All other systems reviewed and are negative  Physical Exam  Physical Exam  Vitals and nursing note reviewed  Constitutional:       Appearance: He is well-developed  He is not diaphoretic  HENT:      Head: Normocephalic and atraumatic  Eyes:      Conjunctiva/sclera: Conjunctivae normal    Cardiovascular:      Rate and Rhythm: Normal rate and regular rhythm  Pulses:           Dorsalis pedis pulses are 1+ on the right side and 1+ on the left side  Heart sounds: Normal heart sounds  No murmur heard  Pulmonary:      Effort: Pulmonary effort is normal  No respiratory distress  Breath sounds: Normal breath sounds  No rales  Abdominal:      Palpations: Abdomen is soft  Musculoskeletal:         General: No deformity  Normal range of motion  Cervical back: Normal range of motion  Right lower leg: Edema present  Left lower leg: Edema present  Skin:     General: Skin is warm and dry  Comments: Chronic venostasis changes in bilatera LE with associated weeping and erythema, no crepitus, no fluctuance no pain out of proportion, no purulent drainge  Neurological:      Mental Status: He is alert and oriented to person, place, and time  Psychiatric:         Behavior: Behavior normal          Thought Content:  Thought content normal          Judgment: Judgment normal          Vital Signs  ED Triage Vitals   Temperature Pulse Respirations Blood Pressure SpO2   09/19/21 1137 09/19/21 1137 09/19/21 1137 09/19/21 1139 09/19/21 1137   97 8 °F (36 6 °C) 78 20 148/85 96 %      Temp Source Heart Rate Source Patient Position - Orthostatic VS BP Location FiO2 (%)   09/19/21 1137 09/19/21 1137 09/19/21 1137 09/19/21 1137 --   Oral Monitor Sitting Left arm       Pain Score       09/19/21 1137       8           Vitals:    09/19/21 1137 09/19/21 1139 09/19/21 1624   BP:  148/85 142/79   Pulse: 78  78   Patient Position - Orthostatic VS: Sitting  Sitting         Visual Acuity      ED Medications  Medications   ketorolac (TORADOL) injection 15 mg (15 mg Intravenous Given 9/19/21 1517)   furosemide (LASIX) injection 40 mg (40 mg Intravenous Given 9/19/21 1517)   potassium chloride (K-DUR,KLOR-CON) CR tablet 20 mEq (20 mEq Oral Given 9/19/21 1517)       Diagnostic Studies  Results Reviewed     Procedure Component Value Units Date/Time    NT-BNP PRO [[id number]]  (Abnormal) Collected: 09/19/21 1419    Lab Status: Final result Specimen: Blood from Arm, Right Updated: 09/19/21 1503     NT-proBNP 310 pg/mL     Troponin I [[id number]]  (Normal) Collected: 09/19/21 1419    Lab Status: Final result Specimen: Blood from Arm, Right Updated: 09/19/21 1459     Troponin I <0 02 ng/mL     Comprehensive metabolic panel [[id number]]  (Abnormal) Collected: 09/19/21 1419    Lab Status: Final result Specimen: Blood from Arm, Right Updated: 09/19/21 1457     Sodium 134 mmol/L      Potassium 3 2 mmol/L      Chloride 96 mmol/L      CO2 30 mmol/L      ANION GAP 8 mmol/L      BUN 15 mg/dL      Creatinine 1 04 mg/dL      Glucose 136 mg/dL      Calcium 8 9 mg/dL      Corrected Calcium 9 7 mg/dL      AST 13 U/L      ALT 15 U/L      Alkaline Phosphatase 105 U/L      Total Protein 8 1 g/dL      Albumin 3 0 g/dL      Total Bilirubin 0 56 mg/dL      eGFR 78 ml/min/1 73sq m     Narrative:      [place] guidelines for Chronic Kidney Disease (CKD):     Stage 1 with normal or high GFR (GFR > 90 mL/min/1 73 square meters)    Stage 2 Mild CKD (GFR = 60-89 mL/min/1 73 square meters)    Stage 3A Moderate CKD (GFR = 45-59 mL/min/1 73 square meters)    Stage 3B Moderate CKD (GFR = 30-44 mL/min/1 73 square meters)    Stage 4 Severe CKD (GFR = 15-29 mL/min/1 73 square meters)    Stage 5 End Stage CKD (GFR <15 mL/min/1 73 square meters)  Note: GFR calculation is accurate only with a steady state creatinine    CBC and differential [[id number]]  (Abnormal) Collected: 09/19/21 1419    Lab Status: Final result Specimen: Blood from Arm, Right Updated: 09/19/21 1424     WBC 13 78 Thousand/uL      RBC 4 11 Million/uL      Hemoglobin 12 9 g/dL      Hematocrit 40 1 %      MCV 98 fL      MCH 31 4 pg      MCHC 32 2 g/dL      RDW 13 0 %      MPV 9 5 fL      Platelets 661 Thousands/uL      nRBC 0 /100 WBCs      Neutrophils Relative 80 %      Immat GRANS % 1 %      Lymphocytes Relative 12 %      Monocytes Relative 6 %      Eosinophils Relative 1 %      Basophils Relative 0 %      Neutrophils Absolute 10 86 Thousands/µL      Immature Grans Absolute 0 16 Thousand/uL      Lymphocytes Absolute 1 67 Thousands/µL      Monocytes Absolute 0 86 Thousand/µL      Eosinophils Absolute 0 17 Thousand/µL      Basophils Absolute 0 06 Thousands/µL                  XR chest 1 view portable   ED Interpretation by Harman Lin MD (09/19 1501)   AND      Final Result by Rehana Canlaes MD (09/20 0831)      No acute cardiopulmonary disease  Workstation performed: [id number]                    Procedures  Procedures         ED Course  ED Course as of Sep 24 1635   Jc Covarrubias Sep 19, 2021   1433 Mild leukocytosis without banding, dorothy h/h or platelets  CBC and differential(!)   1459 wnl   Troponin I: <0 02   1459 Minimally low sodium, minimally low potassium    Comprehensive metabolic panel(!)   2811 Mildly elevated   NT-proBNP(!): 310                                           MDM  Number of Diagnoses or Management Options  Chronic venous stasis dermatitis of both lower extremities  Diagnosis management comments: Chronic venostasis changes with possible superinfection (although less likely) 2/2 CHF  Pt without respiratory symptoms or chest pain    CXR without apparent vol overload and pt adamant that he is not having SOB or change in exercise tolerance  Pt given dose IV lasix, area of erythema outlined, and keflex initated  Pt referred to wound care, where he has been followed before, likely will need extensive attention  RTER precautions discussed and documented on discharge paperwork, pt and family endorsed good understanding of reasons to return  Amount and/or Complexity of Data Reviewed  Clinical lab tests: ordered and reviewed  Tests in the radiology section of CPT®: ordered and reviewed  Tests in the medicine section of CPT®: ordered and reviewed  Review and summarize past medical records: yes  Independent visualization of images, tracings, or specimens: yes    Risk of Complications, Morbidity, and/or Mortality  Presenting problems: moderate  Diagnostic procedures: moderate  Management options: moderate    Patient Progress  Patient progress: stable      Disposition  Final diagnoses:   Chronic venous stasis dermatitis of both lower extremities     Time reflects when diagnosis was documented in both MDM as applicable and the Disposition within this note     Time User Action Codes Description Comment    9/19/2021  3:26 PM Cristopher Hurt Add [I87 2] Chronic venous stasis dermatitis of both lower extremities       ED Disposition     ED Disposition Condition Date/Time Comment    Discharge Stable Sun Sep 19, 2021  3:26 PM Michelle Rincon discharge to home/self care              Follow-up Information     Follow up With Specialties Details Why Contact Info Additional Information    Mely Gaxiola MD Family Medicine Schedule an appointment as soon as possible for a visit   [address]  [facility] [place] [address]  [phone]       [address] [facility] Schedule an appointment as soon as possible for a visit   [address]  [phone] [address] [place] [facility] Emergency Medicine  As needed, If symptoms worsen specifically increase redness, swelling, uncontrolled pain, pain in your chest, increased shortness of breath, fever worsening symptoms or any new concerns [address] [facility] [address] [facility], [address], [place], [place], [address]          Discharge Medication List as of 9/19/2021  3:29 PM      START taking these medications    Details   cephalexin (KEFLEX) 500 mg capsule Take 1 capsule (500 mg total) by mouth every 6 (six) hours for 7 days, Starting Sun 9/19/2021, Until Sun 9/26/2021, Normal      oxyCODONE-acetaminophen (PERCOCET) 5-325 mg per tablet Take 1 tablet by mouth every 6 (six) hours as needed for moderate pain or severe pain for up to 10 daysMax Daily Amount: 4 tablets, Starting Sun 9/19/2021, Until Wed 9/29/2021 at 2359, Normal         CONTINUE these medications which have NOT CHANGED    Details   aspirin (ECOTRIN LOW STRENGTH) 81 mg EC tablet Take 81 mg by mouth daily, Historical Med      atorvastatin (LIPITOR) 20 mg tablet TAKE 1 TABLET BY MOUTH EVERYDAY AT BEDTIME, Normal      bumetanide (BUMEX) 2 mg tablet Take 1 tablet (2 mg total) by mouth 2 (two) times a day, Starting Wed 7/14/2021, Normal      calcium carbonate (TUMS) 500 mg chewable tablet Chew 2 tablets (1,000 mg total) daily as needed for indigestion or heartburn, Starting Tue 6/15/2021, Normal      Klor-Con M20 20 MEQ tablet TAKE 1 TABLET (20 MEQ TOTAL) BY MOUTH 2 (TWO) TIMES A DAY, Normal      meloxicam (MOBIC) 15 mg tablet TAKE 1 TABLET BY MOUTH EVERY DAY, Normal      metoprolol tartrate (LOPRESSOR) 25 mg tablet Take 1 tablet (25 mg total) by mouth every 12 (twelve) hours, Starting Wed 7/14/2021, Normal      senna (SENOKOT) 8 6 mg Take 1 tablet (8 6 mg total) by mouth daily, Starting Wed 6/16/2021, Normal               PDMP Review     None          ED Provider  Electronically Signed by           Olesya Rivas MD  09/24/21 5737

## 2021-09-26 LAB
BACTERIA WND AEROBE CULT: ABNORMAL
GRAM STN SPEC: ABNORMAL
GRAM STN SPEC: ABNORMAL

## 2021-09-28 DIAGNOSIS — L97.218 NON-PRESSURE CHRONIC ULCER OF RIGHT CALF WITH OTHER SPECIFIED SEVERITY (HCC): ICD-10-CM

## 2021-09-28 DIAGNOSIS — L97.228 NON-PRESSURE CHRONIC ULCER OF LEFT CALF WITH OTHER SPECIFIED SEVERITY (HCC): Primary | ICD-10-CM

## 2021-09-28 RX ORDER — LEVOFLOXACIN 500 MG/1
500 TABLET, FILM COATED ORAL EVERY 24 HOURS
Qty: 7 TABLET | Refills: 0 | Status: SHIPPED | OUTPATIENT
Start: 2021-09-28 | End: 2021-10-05

## 2021-09-30 ENCOUNTER — OFFICE VISIT (OUTPATIENT)
Dept: WOUND CARE | Facility: HOSPITAL | Age: 61
End: 2021-09-30
Payer: MEDICARE

## 2021-09-30 VITALS
HEART RATE: 88 BPM | SYSTOLIC BLOOD PRESSURE: 180 MMHG | RESPIRATION RATE: 20 BRPM | TEMPERATURE: 98 F | DIASTOLIC BLOOD PRESSURE: 92 MMHG

## 2021-09-30 DIAGNOSIS — I89.0 LYMPHEDEMA OF BOTH LOWER EXTREMITIES: ICD-10-CM

## 2021-09-30 DIAGNOSIS — I87.311 CHRONIC VENOUS HYPERTENSION (IDIOPATHIC) WITH ULCER OF RIGHT LOWER EXTREMITY (HCC): ICD-10-CM

## 2021-09-30 DIAGNOSIS — L97.218 NON-PRESSURE CHRONIC ULCER OF RIGHT CALF WITH OTHER SPECIFIED SEVERITY (HCC): ICD-10-CM

## 2021-09-30 DIAGNOSIS — R26.2 AMBULATORY DYSFUNCTION: ICD-10-CM

## 2021-09-30 DIAGNOSIS — L97.929 CHRONIC VENOUS HYPERTENSION (IDIOPATHIC) WITH ULCER OF LEFT LOWER EXTREMITY (HCC): ICD-10-CM

## 2021-09-30 DIAGNOSIS — L97.228 NON-PRESSURE CHRONIC ULCER OF LEFT CALF WITH OTHER SPECIFIED SEVERITY (HCC): Primary | ICD-10-CM

## 2021-09-30 DIAGNOSIS — L97.919 CHRONIC VENOUS HYPERTENSION (IDIOPATHIC) WITH ULCER OF RIGHT LOWER EXTREMITY (HCC): ICD-10-CM

## 2021-09-30 DIAGNOSIS — I87.312 CHRONIC VENOUS HYPERTENSION (IDIOPATHIC) WITH ULCER OF LEFT LOWER EXTREMITY (HCC): ICD-10-CM

## 2021-09-30 DIAGNOSIS — I50.32 CHRONIC DIASTOLIC (CONGESTIVE) HEART FAILURE (HCC): ICD-10-CM

## 2021-09-30 DIAGNOSIS — E66.01 MORBID OBESITY (HCC): ICD-10-CM

## 2021-09-30 PROCEDURE — 99214 OFFICE O/P EST MOD 30 MIN: CPT | Performed by: PREVENTIVE MEDICINE

## 2021-09-30 PROCEDURE — 99213 OFFICE O/P EST LOW 20 MIN: CPT | Performed by: PREVENTIVE MEDICINE

## 2021-09-30 RX ORDER — LIDOCAINE HYDROCHLORIDE 40 MG/ML
5 SOLUTION TOPICAL ONCE
Status: COMPLETED | OUTPATIENT
Start: 2021-09-30 | End: 2021-09-30

## 2021-09-30 RX ADMIN — LIDOCAINE HYDROCHLORIDE 5 ML: 40 SOLUTION TOPICAL at 08:45

## 2021-09-30 NOTE — PROGRESS NOTES
Patient ID: Jacinta Estrada is a 61 y o  male Date of Birth 1960       Chief Complaint   Patient presents with    Follow Up Wound Care Visit     RLE and LLE       Allergies:  Patient has no known allergies  Diagnosis:  1  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)  -     lidocaine (XYLOCAINE) 4 % topical solution 5 mL  -     Wound cleansing and dressings; Future  -     Wound compression and edema control; Future    2  Non-pressure chronic ulcer of right calf with other specified severity (HCC)  -     lidocaine (XYLOCAINE) 4 % topical solution 5 mL  -     Wound cleansing and dressings; Future  -     Wound compression and edema control; Future    3  Lymphedema of both lower extremities    4  Chronic venous hypertension (idiopathic) with ulcer of left lower extremity (HCC)    5  Chronic venous hypertension (idiopathic) with ulcer of right lower extremity (HCC)    6  Ambulatory dysfunction    7  Morbid obesity ([address] )    8  Chronic diastolic (congestive) heart failure (HCC)       Diagnosis ICD-10-CM Associated Orders   1  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)  L97 228 lidocaine (XYLOCAINE) 4 % topical solution 5 mL     Wound cleansing and dressings     Wound compression and edema control   2  Non-pressure chronic ulcer of right calf with other specified severity (HCC)  L97 218 lidocaine (XYLOCAINE) 4 % topical solution 5 mL     Wound cleansing and dressings     Wound compression and edema control   3  Lymphedema of both lower extremities  I89 0    4  Chronic venous hypertension (idiopathic) with ulcer of left lower extremity (HCC)  I87 312     L97 929    5  Chronic venous hypertension (idiopathic) with ulcer of right lower extremity (HCC)  I87 311     L97 919    6  Ambulatory dysfunction  R26 2    7  Morbid obesity ([address] )  E66 01    8  Chronic diastolic (congestive) heart failure (HCC)  I50 32         Assessment & Plan:  See wound orders   Taking levaquin as directed after review of recent wound culture  Still heavy drainage  No fever  Counseled again on the importance of compression, elevation, weightloss, exercise  Patient with ongoing issues for years with chronic edema, would benefit from compression pumps which were ordered  f/u 2 weeks    Subjective:   F/u b/l LE ulcers   No new complaints      The following portions of the patient's history were reviewed and updated as appropriate:   Patient Active Problem List   Diagnosis    Primary osteoarthritis of both knees    Essential hypertension    Mixed hyperlipidemia    Morbid obesity ([facility] )    CASSIE (obstructive sleep apnea)    Vitamin D deficiency    Ambulatory dysfunction    Bilateral lower extremity edema    Venous stasis ulcers of both lower extremities ([facility] )    Former smoker    Edema of left upper arm    Acute diastolic heart failure (HCC)    GENOVEVA (acute kidney injury) ([facility] )    [facility]congest[facility]    History of atrial fibrillation    Abnormal renal function     Past Medical History:   Diagnosis Date    Arthritis     Atrial fibrillation ([facility] )     Essential hypertension     Hyperlipidemia     Hypertension     Lower extremity edema     Obesity     CASSIE (obstructive sleep apnea)     Osteoarthritis of both knees     PSVT (paroxysmal supraventricular tachycardia) ([facility])     Vitamin D deficiency      Past Surgical History:   Procedure Laterality Date    ATRIAL ABLATION SURGERY      KNEE SURGERY       Social History     Socioeconomic History    Marital status: /Civil Union     Spouse name: None    Number of children: None    Years of education: 12    Highest education level: 12th grade   Occupational History    None   Tobacco Use    Smoking status: Former Smoker     Packs/day: 1 00     Years: 15 00     Pack years: 15 00     Types: Cigarettes     Start date: 2006     Quit date: 2021     Years since quittin 7    Smokeless tobacco: Never Used   Vaping Use    Vaping Use: Never used   Substance and Sexual Activity    Alcohol use: Not Currently     Alcohol/week: 0 0 standard drinks     Comment: occ per jacklyn     Drug use: Never    Sexual activity: None   Other Topics Concern    None   Social History Narrative    None     Social Determinants of Health     Financial Resource Strain:     Difficulty of Paying Living Expenses:    Food Insecurity:     Worried About Running Out of Food in the Last Year:     Ran Out of Food in the Last Year:    Transportation Needs:     Lack of Transportation (Medical):      Lack of Transportation (Non-Medical):    Physical Activity:     Days of Exercise per Week:     Minutes of Exercise per Session:    Stress:     Feeling of Stress :    Social Connections:     Frequency of Communication with Friends and Family:     Frequency of Social Gatherings with Friends and Family:     Attends Worship Services:     Active Member of Clubs or Organizations:     Attends Club or Organization Meetings:     Marital Status:    Intimate Partner Violence:     Fear of Current or Ex-Partner:     Emotionally Abused:     Physically Abused:     Sexually Abused:         Current Outpatient Medications:     aspirin (ECOTRIN LOW STRENGTH) 81 mg EC tablet, Take 81 mg by mouth daily, Disp: , Rfl:     atorvastatin (LIPITOR) 20 mg tablet, TAKE 1 TABLET BY MOUTH EVERYDAY AT BEDTIME, Disp: 90 tablet, Rfl: 0    bumetanide (BUMEX) 2 mg tablet, Take 1 tablet (2 mg total) by mouth 2 (two) times a day, Disp: 180 tablet, Rfl: 1    calcium carbonate (TUMS) 500 mg chewable tablet, Chew 2 tablets (1,000 mg total) daily as needed for indigestion or heartburn, Disp: 30 tablet, Rfl: 0    Klor-Con M20 20 MEQ tablet, TAKE 1 TABLET (20 MEQ TOTAL) BY MOUTH 2 (TWO) TIMES A DAY, Disp: 180 tablet, Rfl: 1    levofloxacin (LEVAQUIN) 500 mg tablet, Take 1 tablet (500 mg total) by mouth every 24 hours for 7 days, Disp: 7 tablet, Rfl: 0   meloxicam (MOBIC) 15 mg tablet, TAKE 1 TABLET BY MOUTH EVERY DAY, Disp: 90 tablet, Rfl: 2    metoprolol tartrate (LOPRESSOR) 25 mg tablet, Take 1 tablet (25 mg total) by mouth every 12 (twelve) hours, Disp: 180 tablet, Rfl: 1    senna (SENOKOT) 8 6 mg, Take 1 tablet (8 6 mg total) by mouth daily, Disp: 30 tablet, Rfl: 0  No current facility-administered medications for this visit  Family History   Problem Relation Age of Onset    Diabetes Mother     Alzheimer's disease Mother     Heart disease Father     Arthritis Father     Asthma Sister     No Known Problems Brother     No Known Problems Sister     No Known Problems Sister       Review of Systems   Constitutional: Negative  Respiratory: Negative  Cardiovascular: Positive for leg swelling  Musculoskeletal: Positive for gait problem  Skin: Positive for wound  Objective:  BP (!) 180/92   Pulse 88   Temp 98 °F (36 7 °C)   Resp 20     Physical Exam  Vitals reviewed  Constitutional:       General: He is not in acute distress  Appearance: He is obese  Cardiovascular:      Rate and Rhythm: Normal rate  Pulmonary:      Effort: Pulmonary effort is normal    Musculoskeletal:      Right lower leg: Edema present  Left lower leg: Edema present  Skin:     Comments: See wound assessment   Neurological:      General: No focal deficit present  Mental Status: He is alert and oriented to person, place, and time  Gait: Gait abnormal    Psychiatric:         Mood and Affect: Mood normal          Behavior: Behavior normal               Wound 09/23/21 Venous Ulcer Leg Right (Active)   Wound Image      09/30/21 0842   Wound Description Beefy red;Granulation tissue;Pink;Brown;Eschar;Slough; Yellow 09/30/21 0842   Maria Teresa-wound Assessment Dry;Edema; Erythema;Scaly 09/30/21 0842   Wound Length (cm) 29 cm 09/30/21 0842   Wound Width (cm) 48 5 cm 09/30/21 0842   Wound Depth (cm) 0 1 cm 09/30/21 0842   Wound Surface Area (cm^2) 1406 5 cm^2 09/30/21 0842   Wound Volume (cm^3) 140 65 cm^3 09/30/21 0842   Calculated Wound Volume (cm^3) 140 65 cm^3 09/30/21 0842   Change in Wound Size % 6 09/30/21 0842   Drainage Amount Large 09/30/21 0842   Drainage Description Serous; Yellow 09/30/21 0842   Non-staged Wound Description Full thickness 09/30/21 0842   Dressing Status Intact; New drainage; Old drainage 09/30/21 0842       Wound 09/23/21 Venous Ulcer Leg Left (Active)   Wound Image      09/30/21 0840   Wound Description Brown;Eschar;Epithelialization;Pink;Granulation tissue 09/30/21 0839   Maria Teresa-wound Assessment Dry;Edema; Erythema;Scaly 09/30/21 0839   Wound Length (cm) 24 cm 09/30/21 0839   Wound Width (cm) 43 6 cm 09/30/21 0839   Wound Depth (cm) 0 1 cm 09/30/21 0839   Wound Surface Area (cm^2) 1046 4 cm^2 09/30/21 0839   Wound Volume (cm^3) 104 64 cm^3 09/30/21 0839   Calculated Wound Volume (cm^3) 104 64 cm^3 09/30/21 0839   Change in Wound Size % 0 91 09/30/21 0839   Drainage Amount Large 09/30/21 0839   Drainage Description Serous; Yellow 09/30/21 0839   Non-staged Wound Description Full thickness 09/30/21 0839   Dressing Status Intact; Old drainage 09/30/21 0839                         Procedures     Results from last 6 Months   Lab Units 09/23/21  1620   WOUND CULTURE  4+ Growth of Pseudomonas aeruginosa*         Wound Instructions:  Orders Placed This Encounter   Procedures    Wound cleansing and dressings      Right and left leg wound  Wash your hands with soap and water  Remove old dressing, discard into plastic bag and place in trash  Cleanse the wound with PROPHASE wash every other day (soap and water on other days) prior to applying a clean dressing  Do not use tissue or cotton balls  Do not scrub the wound  Pat dry using gauze  Shower yes   Apply Maxorb to the wound  Cover with ABD pad or diapers  Secure with rolled gauze and tape  Change dressing daily   If dressings are soaking through early, replace just ABD pads on top  And secure again with rolled gauze and tape  This was done today            Standing Status:   Future     Standing Expiration Date:   9/30/2022    Wound compression and edema control     Elastic Tubular Stocking - spandagrip G to both legs     Tubular elastic bandage: Apply from base of toes to behind the knee  Apply in AM, may remove for sleep      Avoid prolonged standing in one place      Elevate leg(s) above the level of the heart when sitting or as much as possible  Standing Status:   Future     Standing Expiration Date:   9/30/2022         Delvis Correia DO      Portions of the record may have been created with voice recognition software  Occasional wrong word or "sound a like" substitutions may have occurred due to the inherent limitations of voice recognition software  Read the chart carefully and recognize, using context, where substitutions have occurred

## 2021-09-30 NOTE — PATIENT INSTRUCTIONS
Orders Placed This Encounter   Procedures    Wound cleansing and dressings      Right and left leg wound  Wash your hands with soap and water  Remove old dressing, discard into plastic bag and place in trash  Cleanse the wound with PROPHASE wash every other day (soap and water on other days) prior to applying a clean dressing  Do not use tissue or cotton balls  Do not scrub the wound  Pat dry using gauze  Shower yes   Apply Maxorb to the wound  Cover with ABD pad or diapers  Secure with rolled gauze and tape  Change dressing daily  If dressings are soaking through early, replace just ABD pads on top  And secure again with rolled gauze and tape  This was done today            Standing Status:   Future     Standing Expiration Date:   9/30/2022    Wound compression and edema control     Elastic Tubular Stocking - spandagrip G to both legs     Tubular elastic bandage: Apply from base of toes to behind the knee  Apply in AM, may remove for sleep      Avoid prolonged standing in one place      Elevate leg(s) above the level of the heart when sitting or as much as possible       Standing Status:   Future     Standing Expiration Date:   9/30/2022

## 2021-10-21 ENCOUNTER — OFFICE VISIT (OUTPATIENT)
Dept: WOUND CARE | Facility: HOSPITAL | Age: 61
End: 2021-10-21
Payer: MEDICARE

## 2021-10-21 VITALS
TEMPERATURE: 98.4 F | SYSTOLIC BLOOD PRESSURE: 147 MMHG | DIASTOLIC BLOOD PRESSURE: 77 MMHG | RESPIRATION RATE: 16 BRPM | HEART RATE: 55 BPM

## 2021-10-21 DIAGNOSIS — L97.929 CHRONIC VENOUS HYPERTENSION (IDIOPATHIC) WITH ULCER OF LEFT LOWER EXTREMITY (HCC): Primary | ICD-10-CM

## 2021-10-21 DIAGNOSIS — I87.312 CHRONIC VENOUS HYPERTENSION (IDIOPATHIC) WITH ULCER OF LEFT LOWER EXTREMITY (HCC): Primary | ICD-10-CM

## 2021-10-21 DIAGNOSIS — I89.0 LYMPHEDEMA OF BOTH LOWER EXTREMITIES: ICD-10-CM

## 2021-10-21 DIAGNOSIS — E66.01 MORBID OBESITY (HCC): ICD-10-CM

## 2021-10-21 DIAGNOSIS — L97.218 NON-PRESSURE CHRONIC ULCER OF RIGHT CALF WITH OTHER SPECIFIED SEVERITY (HCC): ICD-10-CM

## 2021-10-21 DIAGNOSIS — L97.228 NON-PRESSURE CHRONIC ULCER OF LEFT CALF WITH OTHER SPECIFIED SEVERITY (HCC): ICD-10-CM

## 2021-10-21 DIAGNOSIS — L97.919 CHRONIC VENOUS HYPERTENSION (IDIOPATHIC) WITH ULCER OF RIGHT LOWER EXTREMITY (HCC): ICD-10-CM

## 2021-10-21 DIAGNOSIS — I87.311 CHRONIC VENOUS HYPERTENSION (IDIOPATHIC) WITH ULCER OF RIGHT LOWER EXTREMITY (HCC): ICD-10-CM

## 2021-10-21 DIAGNOSIS — R26.2 AMBULATORY DYSFUNCTION: ICD-10-CM

## 2021-10-21 PROCEDURE — 99213 OFFICE O/P EST LOW 20 MIN: CPT | Performed by: PREVENTIVE MEDICINE

## 2021-10-21 PROCEDURE — 99212 OFFICE O/P EST SF 10 MIN: CPT | Performed by: PREVENTIVE MEDICINE

## 2021-10-28 ENCOUNTER — OFFICE VISIT (OUTPATIENT)
Dept: FAMILY MEDICINE CLINIC | Facility: CLINIC | Age: 61
End: 2021-10-28
Payer: MEDICARE

## 2021-10-28 VITALS
OXYGEN SATURATION: 98 % | TEMPERATURE: 96.8 F | HEART RATE: 70 BPM | BODY MASS INDEX: 50.62 KG/M2 | WEIGHT: 315 LBS | DIASTOLIC BLOOD PRESSURE: 60 MMHG | SYSTOLIC BLOOD PRESSURE: 130 MMHG | HEIGHT: 66 IN

## 2021-10-28 DIAGNOSIS — E66.01 CLASS 3 SEVERE OBESITY DUE TO EXCESS CALORIES WITH SERIOUS COMORBIDITY AND BODY MASS INDEX (BMI) OF 60.0 TO 69.9 IN ADULT (HCC): ICD-10-CM

## 2021-10-28 DIAGNOSIS — I83.019 VENOUS STASIS ULCERS OF BOTH LOWER EXTREMITIES (HCC): ICD-10-CM

## 2021-10-28 DIAGNOSIS — L97.929 VENOUS STASIS ULCERS OF BOTH LOWER EXTREMITIES (HCC): ICD-10-CM

## 2021-10-28 DIAGNOSIS — I50.32 CHRONIC DIASTOLIC (CONGESTIVE) HEART FAILURE (HCC): ICD-10-CM

## 2021-10-28 DIAGNOSIS — E78.2 MIXED HYPERLIPIDEMIA: ICD-10-CM

## 2021-10-28 DIAGNOSIS — I10 ESSENTIAL HYPERTENSION: Primary | ICD-10-CM

## 2021-10-28 DIAGNOSIS — Z23 IMMUNIZATION DUE: ICD-10-CM

## 2021-10-28 DIAGNOSIS — M17.0 PRIMARY OSTEOARTHRITIS OF BOTH KNEES: ICD-10-CM

## 2021-10-28 DIAGNOSIS — Z86.79 HISTORY OF ATRIAL FIBRILLATION: ICD-10-CM

## 2021-10-28 DIAGNOSIS — R26.9 GAIT DISTURBANCE: ICD-10-CM

## 2021-10-28 DIAGNOSIS — M25.562 CHRONIC PAIN OF BOTH KNEES: ICD-10-CM

## 2021-10-28 DIAGNOSIS — I83.029 VENOUS STASIS ULCERS OF BOTH LOWER EXTREMITIES (HCC): ICD-10-CM

## 2021-10-28 DIAGNOSIS — Z12.5 PROSTATE CANCER SCREENING: ICD-10-CM

## 2021-10-28 DIAGNOSIS — G89.29 CHRONIC PAIN OF BOTH KNEES: ICD-10-CM

## 2021-10-28 DIAGNOSIS — G47.33 OSA (OBSTRUCTIVE SLEEP APNEA): ICD-10-CM

## 2021-10-28 DIAGNOSIS — M25.561 CHRONIC PAIN OF BOTH KNEES: ICD-10-CM

## 2021-10-28 DIAGNOSIS — L97.919 VENOUS STASIS ULCERS OF BOTH LOWER EXTREMITIES (HCC): ICD-10-CM

## 2021-10-28 PROCEDURE — G0008 ADMIN INFLUENZA VIRUS VAC: HCPCS | Performed by: FAMILY MEDICINE

## 2021-10-28 PROCEDURE — 90732 PPSV23 VACC 2 YRS+ SUBQ/IM: CPT | Performed by: FAMILY MEDICINE

## 2021-10-28 PROCEDURE — 90682 RIV4 VACC RECOMBINANT DNA IM: CPT | Performed by: FAMILY MEDICINE

## 2021-10-28 PROCEDURE — G0009 ADMIN PNEUMOCOCCAL VACCINE: HCPCS | Performed by: FAMILY MEDICINE

## 2021-10-28 PROCEDURE — 99214 OFFICE O/P EST MOD 30 MIN: CPT | Performed by: FAMILY MEDICINE

## 2021-11-04 ENCOUNTER — TELEPHONE (OUTPATIENT)
Dept: FAMILY MEDICINE CLINIC | Facility: CLINIC | Age: 61
End: 2021-11-04

## 2021-11-13 ENCOUNTER — IMMUNIZATIONS (OUTPATIENT)
Dept: FAMILY MEDICINE CLINIC | Facility: HOSPITAL | Age: 61
End: 2021-11-13

## 2021-11-13 DIAGNOSIS — Z23 ENCOUNTER FOR IMMUNIZATION: Primary | ICD-10-CM

## 2021-11-13 PROCEDURE — 0001A COVID-19 PFIZER VACC 0.3 ML: CPT

## 2021-11-13 PROCEDURE — 91300 COVID-19 PFIZER VACC 0.3 ML: CPT

## 2021-11-19 ENCOUNTER — TELEMEDICINE (OUTPATIENT)
Dept: FAMILY MEDICINE CLINIC | Facility: CLINIC | Age: 61
End: 2021-11-19
Payer: MEDICARE

## 2021-11-19 DIAGNOSIS — I83.019 VENOUS STASIS ULCERS OF BOTH LOWER EXTREMITIES (HCC): Primary | ICD-10-CM

## 2021-11-19 DIAGNOSIS — M17.0 PRIMARY OSTEOARTHRITIS OF BOTH KNEES: ICD-10-CM

## 2021-11-19 DIAGNOSIS — R26.2 AMBULATORY DYSFUNCTION: ICD-10-CM

## 2021-11-19 DIAGNOSIS — R26.81 GAIT INSTABILITY: ICD-10-CM

## 2021-11-19 DIAGNOSIS — E66.01 MORBID OBESITY (HCC): ICD-10-CM

## 2021-11-19 DIAGNOSIS — L97.919 VENOUS STASIS ULCERS OF BOTH LOWER EXTREMITIES (HCC): Primary | ICD-10-CM

## 2021-11-19 DIAGNOSIS — L97.929 VENOUS STASIS ULCERS OF BOTH LOWER EXTREMITIES (HCC): Primary | ICD-10-CM

## 2021-11-19 DIAGNOSIS — I50.32 CHRONIC DIASTOLIC (CONGESTIVE) HEART FAILURE (HCC): ICD-10-CM

## 2021-11-19 DIAGNOSIS — I83.029 VENOUS STASIS ULCERS OF BOTH LOWER EXTREMITIES (HCC): Primary | ICD-10-CM

## 2021-11-19 PROCEDURE — 99214 OFFICE O/P EST MOD 30 MIN: CPT | Performed by: FAMILY MEDICINE

## 2021-11-19 RX ORDER — WHEELCHAIR
EACH MISCELLANEOUS DAILY
Qty: 1 EACH | Refills: 0 | Status: SHIPPED | OUTPATIENT
Start: 2021-11-19

## 2022-01-07 DIAGNOSIS — I50.31 ACUTE DIASTOLIC HEART FAILURE (HCC): ICD-10-CM

## 2022-01-08 DIAGNOSIS — Z76.0 ENCOUNTER FOR ISSUE OF REPEAT PRESCRIPTION: ICD-10-CM

## 2022-01-10 RX ORDER — ATORVASTATIN CALCIUM 20 MG/1
TABLET, FILM COATED ORAL
Qty: 90 TABLET | Refills: 0 | Status: SHIPPED | OUTPATIENT
Start: 2022-01-10 | End: 2022-01-31 | Stop reason: SDUPTHER

## 2022-01-12 DIAGNOSIS — I50.31 ACUTE DIASTOLIC HEART FAILURE (HCC): ICD-10-CM

## 2022-01-12 RX ORDER — POTASSIUM CHLORIDE 20 MEQ/1
20 TABLET, EXTENDED RELEASE ORAL 2 TIMES DAILY
Qty: 180 TABLET | Refills: 1 | Status: SHIPPED | OUTPATIENT
Start: 2022-01-12 | End: 2022-01-31 | Stop reason: SDUPTHER

## 2022-01-23 DIAGNOSIS — I50.31 ACUTE DIASTOLIC HEART FAILURE (HCC): ICD-10-CM

## 2022-01-24 RX ORDER — BUMETANIDE 2 MG/1
2 TABLET ORAL 2 TIMES DAILY
Qty: 180 TABLET | Refills: 1 | Status: SHIPPED | OUTPATIENT
Start: 2022-01-24 | End: 2022-02-01 | Stop reason: SDUPTHER

## 2022-01-31 DIAGNOSIS — Z76.0 ENCOUNTER FOR ISSUE OF REPEAT PRESCRIPTION: ICD-10-CM

## 2022-01-31 DIAGNOSIS — I50.31 ACUTE DIASTOLIC HEART FAILURE (HCC): ICD-10-CM

## 2022-01-31 RX ORDER — POTASSIUM CHLORIDE 20 MEQ/1
20 TABLET, EXTENDED RELEASE ORAL 2 TIMES DAILY
Qty: 180 TABLET | Refills: 1 | Status: SHIPPED | OUTPATIENT
Start: 2022-01-31

## 2022-01-31 RX ORDER — ATORVASTATIN CALCIUM 20 MG/1
20 TABLET, FILM COATED ORAL
Qty: 90 TABLET | Refills: 1 | Status: SHIPPED | OUTPATIENT
Start: 2022-01-31

## 2022-02-01 DIAGNOSIS — I50.31 ACUTE DIASTOLIC HEART FAILURE (HCC): ICD-10-CM

## 2022-02-01 RX ORDER — BUMETANIDE 2 MG/1
2 TABLET ORAL 2 TIMES DAILY
Qty: 180 TABLET | Refills: 1 | Status: SHIPPED | OUTPATIENT
Start: 2022-02-01

## 2022-09-17 DIAGNOSIS — I50.31 ACUTE DIASTOLIC HEART FAILURE (HCC): ICD-10-CM

## 2022-09-19 RX ORDER — POTASSIUM CHLORIDE 20 MEQ/1
TABLET, EXTENDED RELEASE ORAL
Qty: 180 TABLET | Refills: 1 | Status: SHIPPED | OUTPATIENT
Start: 2022-09-19

## 2022-10-17 ENCOUNTER — RA CDI HCC (OUTPATIENT)
Dept: OTHER | Facility: HOSPITAL | Age: 62
End: 2022-10-17

## 2022-10-17 NOTE — PROGRESS NOTES
[facility]  coding opportunities          Chart Reviewed number of suggestions sent to Provider: 1  I11 0     Patients Insurance     Medicare Insurance: [facility]

## 2022-10-24 ENCOUNTER — OFFICE VISIT (OUTPATIENT)
Dept: FAMILY MEDICINE CLINIC | Facility: CLINIC | Age: 62
End: 2022-10-24
Payer: COMMERCIAL

## 2022-10-24 VITALS
HEART RATE: 66 BPM | BODY MASS INDEX: 49.44 KG/M2 | OXYGEN SATURATION: 95 % | HEIGHT: 67 IN | WEIGHT: 315 LBS | DIASTOLIC BLOOD PRESSURE: 60 MMHG | TEMPERATURE: 98.4 F | RESPIRATION RATE: 16 BRPM | SYSTOLIC BLOOD PRESSURE: 100 MMHG

## 2022-10-24 DIAGNOSIS — Z12.5 PROSTATE CANCER SCREENING: ICD-10-CM

## 2022-10-24 DIAGNOSIS — L97.929 VENOUS STASIS ULCERS OF BOTH LOWER EXTREMITIES (HCC): ICD-10-CM

## 2022-10-24 DIAGNOSIS — Z23 NEED FOR INFLUENZA VACCINATION: Primary | ICD-10-CM

## 2022-10-24 DIAGNOSIS — M17.0 PRIMARY OSTEOARTHRITIS OF BOTH KNEES: ICD-10-CM

## 2022-10-24 DIAGNOSIS — I83.019 VENOUS STASIS ULCERS OF BOTH LOWER EXTREMITIES (HCC): ICD-10-CM

## 2022-10-24 DIAGNOSIS — I83.029 VENOUS STASIS ULCERS OF BOTH LOWER EXTREMITIES (HCC): ICD-10-CM

## 2022-10-24 DIAGNOSIS — Z12.11 SCREENING FOR COLON CANCER: ICD-10-CM

## 2022-10-24 DIAGNOSIS — L97.919 VENOUS STASIS ULCERS OF BOTH LOWER EXTREMITIES (HCC): ICD-10-CM

## 2022-10-24 DIAGNOSIS — I50.32 CHRONIC DIASTOLIC (CONGESTIVE) HEART FAILURE (HCC): ICD-10-CM

## 2022-10-24 DIAGNOSIS — I10 PRIMARY HYPERTENSION: ICD-10-CM

## 2022-10-24 DIAGNOSIS — E66.01 CLASS 3 SEVERE OBESITY DUE TO EXCESS CALORIES WITH SERIOUS COMORBIDITY AND BODY MASS INDEX (BMI) OF 60.0 TO 69.9 IN ADULT (HCC): ICD-10-CM

## 2022-10-24 DIAGNOSIS — E78.5 DYSLIPIDEMIA: ICD-10-CM

## 2022-10-24 DIAGNOSIS — I50.32 CHRONIC DIASTOLIC HEART FAILURE (HCC): ICD-10-CM

## 2022-10-24 DIAGNOSIS — I10 ESSENTIAL HYPERTENSION: ICD-10-CM

## 2022-10-24 PROBLEM — I50.31 ACUTE DIASTOLIC HEART FAILURE (HCC): Status: RESOLVED | Noted: 2021-06-07 | Resolved: 2022-10-24

## 2022-10-24 PROCEDURE — 90682 RIV4 VACC RECOMBINANT DNA IM: CPT | Performed by: FAMILY MEDICINE

## 2022-10-24 PROCEDURE — 99214 OFFICE O/P EST MOD 30 MIN: CPT | Performed by: FAMILY MEDICINE

## 2022-10-24 PROCEDURE — G0008 ADMIN INFLUENZA VIRUS VAC: HCPCS | Performed by: FAMILY MEDICINE

## 2022-10-24 NOTE — PROGRESS NOTES
Name: Tima Morocho      : 1960      MRN: [id number]  Encounter Provider: Hellen Strange MD  Encounter Date: 10/24/2022   Encounter department: [address] [place] [address] MEDICINE    Assessment & Plan     1  Need for influenza vaccination  -     FLUBLOK: influenza vaccine, quadrivalent, recombinant, PF, 0 5 mL    2  Screening for colon cancer  -     Cologuard    3  Chronic diastolic heart failure ([facility] )  -     Ambulatory Referral to Cardiology; Future    4  Venous stasis ulcers of both lower extremities (HCC)    5  Class 3 severe obesity due to excess calories with serious comorbidity and body mass index (BMI) of 60 0 to 69 9 in adult ([facility] )    6  Chronic diastolic (congestive) heart failure [facility])  Assessment & Plan:  Wt Readings from Last 3 Encounters:   10/24/22 (!) 209 kg (460 lb)   10/28/21 (!) 184 kg (406 lb)   21 (!) 193 kg (425 lb)     Will resend  Consult for cardiology        7  Dyslipidemia  -     Lipid panel; Future; Expected date: 10/24/2022    8  Primary hypertension  -     Comprehensive metabolic panel; Future; Expected date: 10/24/2022    9  Essential hypertension  Assessment & Plan:  Well controlled on current therapy continue with current medications and will reassess next visit        10  Primary osteoarthritis of both knees  Assessment & Plan:  Chronic problem ok with sitting      11  Prostate cancer screening  -     PSA, Total Screen; Future           Subjective      HPI pt here for  Interval visit   Review of Systems   Constitutional: Negative for appetite change, chills, fatigue and fever  HENT: Negative for congestion and sore throat  Respiratory: Positive for cough and wheezing  Negative for chest tightness and shortness of breath  Cardiovascular: Negative for chest pain, palpitations and leg swelling  Gastrointestinal: Negative for abdominal pain, constipation, diarrhea, nausea and vomiting  Genitourinary: Negative for difficulty urinating and frequency  Musculoskeletal: Negative for arthralgias, back pain and neck pain  Skin: Negative for rash  Neurological: Negative for dizziness, weakness, light-headedness, numbness and headaches  Hematological: Does not bruise/bleed easily  Psychiatric/Behavioral: Negative for dysphoric mood and sleep disturbance  The patient is not nervous/anxious  Current Outpatient Medications on File Prior to Visit   Medication Sig   • aspirin (ECOTRIN LOW STRENGTH) 81 mg EC tablet Take 81 mg by mouth daily   • atorvastatin (LIPITOR) 20 mg tablet TAKE 1 TABLET (20 MG TOTAL) BY MOUTH DAILY AT BEDTIME   • bumetanide (BUMEX) 2 mg tablet TAKE 1 TABLET TWICE DAILY   • calcium carbonate (TUMS) 500 mg chewable tablet Chew 2 tablets (1,000 mg total) daily as needed for indigestion or heartburn   • metoprolol tartrate (LOPRESSOR) 25 mg tablet TAKE 1 TABLET (25 MG TOTAL) BY MOUTH EVERY 12 (TWELVE) HOURS   • Misc  Devices (Wheelchair) MISC Use daily   • potassium chloride (K-DUR,KLOR-CON) 20 mEq tablet TAKE 1 TABLET TWICE DAILY   • senna (SENOKOT) 8 6 mg Take 1 tablet (8 6 mg total) by mouth daily       Objective     /60 (BP Location: Right arm, Patient Position: Sitting, Cuff Size: Extra-Large)   Pulse 66   Temp 98 4 °F (36 9 °C) (Temporal)   Resp 16   Ht 5' 7" (1 702 m)   Wt (!) 209 kg (460 lb)   SpO2 95%   BMI 72 05 kg/m²     Physical Exam  Vitals reviewed  Constitutional:       General: He is not in acute distress  Appearance: Normal appearance  He is well-developed  He is obese  He is not ill-appearing  Comments: In wheelchair   HENT:      Mouth/Throat:      Mouth: Mucous membranes are moist    Eyes:      Extraocular Movements: Extraocular movements intact  Conjunctiva/sclera: Conjunctivae normal       Pupils: Pupils are equal, round, and reactive to light  Neck:      Thyroid: No thyromegaly  Vascular: No carotid bruit  Cardiovascular:      Rate and Rhythm: Normal rate and regular rhythm  Pulses: Normal pulses  Heart sounds: Normal heart sounds  No murmur heard  Pulmonary:      Effort: Pulmonary effort is normal  No respiratory distress  Breath sounds: Normal breath sounds  No wheezing  Chest:      Chest wall: No tenderness  Abdominal:      General: Bowel sounds are normal  There is no distension  Palpations: Abdomen is soft  Tenderness: There is no abdominal tenderness  Musculoskeletal:      Cervical back: Normal range of motion and neck supple  Right lower leg: No edema  Left lower leg: No edema  Lymphadenopathy:      Cervical: No cervical adenopathy  Skin:     General: Skin is warm and dry  Neurological:      General: No focal deficit present  Mental Status: He is alert and oriented to person, place, and time  Mental status is at baseline  Cranial Nerves: No cranial nerve deficit  Psychiatric:         Mood and Affect: Mood normal          Behavior: Behavior normal      BMI Counseling: Body mass index is 72 05 kg/m²  The BMI is above normal  Nutrition recommendations include 3-5 servings of fruits/vegetables daily, reducing fast food intake, consuming healthier snacks, decreasing soda and/or juice intake, moderation in carbohydrate intake, increasing intake of lean protein and reducing intake of saturated fat and trans fat  Exercise recommendations include exercising 3-5 times per week and strength training exercises    Malorie Carrillo MD

## 2022-10-24 NOTE — PROGRESS NOTES
Assessment and Plan:     Problem List Items Addressed This Visit        Cardiovascular and Mediastinum    Essential hypertension     Well controlled on current therapy continue with current medications and will reassess next visit           Chronic diastolic (congestive) heart failure (HCC)     Wt Readings from Last 3 Encounters:   10/24/22 (!) 209 kg (460 lb)   10/28/21 (!) 184 kg (406 lb)   09/19/21 (!) 193 kg (425 lb)     Will resend  Consult for cardiology              Musculoskeletal and Integument    Primary osteoarthritis of both knees     Chronic problem ok with sitting         Venous stasis ulcers of both lower extremities ([facility] )      Other Visit Diagnoses     Need for influenza vaccination    -  Primary    Relevant Orders    FLUBLOK: influenza vaccine, quadrivalent, recombinant, PF, 0 5 mL    Screening for colon cancer        Relevant Orders    Cologuard    Chronic diastolic heart failure ([facility] )        Relevant Orders    Ambulatory Referral to Cardiology    Class 3 severe obesity due to excess calories with serious comorbidity and body mass index (BMI) of 60 0 to 69 9 in [facility])        Dyslipidemia        Relevant Orders    Lipid panel    Primary hypertension        Relevant Orders    Comprehensive metabolic panel    Prostate cancer screening        Relevant Orders    PSA, Total Screen           Preventive health issues were discussed with patient, and age appropriate screening tests were ordered as noted in patient's After Visit Summary  Personalized health advice and appropriate referrals for health education or preventive services given if needed, as noted in patient's After Visit Summary       History of Present Illness:     Patient presents for a Medicare Wellness Visit    HPI   Patient Care Team:  Devorah Scott MD as PCP - General (Family Medicine)  Ilsa Lazar DO as PCP - Wound (Wound Care)     Review of Systems:     Review of Systems     Problem List:     Patient Active Problem List Diagnosis   • Primary osteoarthritis of both knees   • Essential hypertension   • Mixed hyperlipidemia   • Morbid obesity ([facility])   • CASSIE (obstructive sleep apnea)   • Vitamin D deficiency   • Ambulatory dysfunction   • Bilateral lower extremity edema   • Venous stasis ulcers of both lower extremities ([facility])   • Former smoker   • Edema of left upper arm   • GENOVEVA (acute kidney injury) ([facility] )   • Chronic diastolic (congestive) heart failure ([facility])   • History of atrial fibrillation   • Abnormal renal function   • Chronic pain of both knees   • Gait instability      Past Medical and Surgical History:     Past Medical History:   Diagnosis Date   • Arthritis    • Atrial fibrillation ([facility] )    • Essential hypertension    • Hyperlipidemia    • Hypertension    • Lower extremity edema    • Obesity    • CASSIE (obstructive sleep apnea)    • Osteoarthritis of both knees    • PSVT (paroxysmal supraventricular tachycardia) ([facility])    • Vitamin D deficiency      Past Surgical History:   Procedure Laterality Date   • ATRIAL ABLATION SURGERY     • KNEE SURGERY        Family History:     Family History   Problem Relation Age of Onset   • Diabetes Mother    • Alzheimer's disease Mother    • Heart disease Father    • Arthritis Father    • Asthma Sister    • No Known Problems Brother    • No Known Problems Sister    • No Known Problems Sister       Social History:     Social History     Socioeconomic History   • Marital status: /Civil Union     Spouse name: None   • Number of children: None   • Years of education: 12   • Highest education level: 12th grade   Occupational History   • None   Tobacco Use   • Smoking status: Former Smoker     Packs/day: 1 00     Years: 15 00     Pack years: 15 00     Types: Cigarettes     Start date: 2006     Quit date: 2021     Years since quittin 7   • Smokeless tobacco: Never Used   Vaping Use   • Vaping Use: Never used   Substance and Sexual Activity   • Alcohol use: Not Currently Alcohol/week: 0 0 standard drinks     Comment: occ per jacklyn    • Drug use: Never   • Sexual activity: Not Currently   Other Topics Concern   • None   Social History Narrative   • None     Social Determinants of Health     Financial Resource Strain: Low Risk    • Difficulty of Paying Living Expenses: Not hard at all   Food Insecurity: Not on file   Transportation Needs: No Transportation Needs   • Lack of Transportation (Medical): No   • Lack of Transportation (Non-Medical): No   Physical Activity: Not on file   Stress: Not on file   Social Connections: Not on file   Intimate Partner Violence: Not on file   Housing Stability: Not on file      Medications and Allergies:     Current Outpatient Medications   Medication Sig Dispense Refill   • aspirin (ECOTRIN LOW STRENGTH) 81 mg EC tablet Take 81 mg by mouth daily     • atorvastatin (LIPITOR) 20 mg tablet TAKE 1 TABLET (20 MG TOTAL) BY MOUTH DAILY AT BEDTIME 30 tablet 0   • bumetanide (BUMEX) 2 mg tablet TAKE 1 TABLET TWICE DAILY 60 tablet 0   • calcium carbonate (TUMS) 500 mg chewable tablet Chew 2 tablets (1,000 mg total) daily as needed for indigestion or heartburn 30 tablet 0   • metoprolol tartrate (LOPRESSOR) 25 mg tablet TAKE 1 TABLET (25 MG TOTAL) BY MOUTH EVERY 12 (TWELVE) HOURS 180 tablet 0   • Misc  Devices (Wheelchair) MISC Use daily 1 each 0   • potassium chloride (K-DUR,KLOR-CON) 20 mEq tablet TAKE 1 TABLET TWICE DAILY 180 tablet 1   • senna (SENOKOT) 8 6 mg Take 1 tablet (8 6 mg total) by mouth daily 30 tablet 0     No current facility-administered medications for this visit       No Known Allergies   Immunizations:     Immunization History   Administered Date(s) Administered   • COVID-19 PFIZER VACCINE 0 3 ML IM 03/22/2021, 04/15/2021, 11/13/2021   • Influenza, recombinant, quadrivalent,injectable, preservative free 10/27/2020, 10/28/2021   • Pneumococcal Polysaccharide PPV23 10/28/2021      Health Maintenance:         Topic Date Due   • Colorectal Cancer Screening  Never done   • HIV Screening  11/24/2022 (Originally 10/7/1975)   • Hepatitis C Screening  Completed         Topic Date Due   • COVID-19 Vaccine (4 - Booster for Ishmael Abdalla series) 03/13/2022   • Influenza Vaccine (1) 09/01/2022      Medicare Screening Tests and Risk Assessments:     Seth Dunaway is here for his Subsequent Wellness visit  Health Risk Assessment:   Patient rates overall health as fair  Patient feels that their physical health rating is slightly worse  Patient is satisfied with their life  Eyesight was rated as slightly worse  Hearing was rated as same  Patient feels that their emotional and mental health rating is same  Patients states they are sometimes angry  Patient states they are sometimes unusually tired/fatigued  Pain experienced in the last 7 days has been some  Patient's pain rating has been 5/10  Patient states that he has experienced weight loss or gain in last 6 months  Depression Screening:   PHQ-2 Score: 0      Fall Risk Screening: In the past year, patient has experienced: no history of falling in past year      Home Safety:  Patient has trouble with stairs inside or outside of their home  Patient has no working smoke alarms and has no working carbon monoxide detector  Home safety hazards include: none  Nutrition:   Current diet is Regular  Medications:   Patient is currently taking over-the-counter supplements  OTC medications include: see medication list  Patient is able to manage medications  Activities of Daily Living (ADLs)/Instrumental Activities of Daily Living (IADLs):   Walk and transfer into and out of bed and chair?: Yes  Dress and groom yourself?: Yes    Bathe or shower yourself?: Yes    Feed yourself?  Yes  Do your laundry/housekeeping?: Yes  Manage your money, pay your bills and track your expenses?: Yes  Make your own meals?: Yes    Do your own shopping?: No    Previous Hospitalizations:   Any hospitalizations or ED visits within the last 12 months?: No      Advance Care Planning:   Living will: No      Cognitive Screening:   Provider or family/friend/caregiver concerned regarding cognition?: No    PREVENTIVE SCREENINGS      Cardiovascular Screening:    General: Screening Not Indicated and History Lipid Disorder    Due for: Lipid Panel      Diabetes Screening:       Due for: Blood Glucose      Colorectal Cancer Screening:     General: Risks and Benefits Discussed    Due for: Cologuard      Prostate Cancer Screening:    General: Risks and Benefits Discussed    Due for: PSA      Abdominal Aortic Aneurysm (AAA) Screening:    Risk factors include: tobacco use        Lung Cancer Screening:     General: Screening Not Indicated      Hepatitis C Screening:    General: Screening Current    Screening, Brief Intervention, and Referral to Treatment (SBIRT)    Screening  Typical number of drinks in a day: 0  Typical number of drinks in a week: 2  Interpretation: Low risk drinking behavior      Single Item Drug Screening:  How often have you used an illegal drug (including marijuana) or a prescription medication for non-medical reasons in the past year? never    Single Item Drug Screen Score: 0  Interpretation: Negative screen for possible drug use disorder    No exam data present     Physical Exam:     /60 (BP Location: Right arm, Patient Position: Sitting, Cuff Size: Extra-Large)   Pulse 66   Temp 98 4 °F (36 9 °C) (Temporal)   Resp 16   Ht 5' 7" (1 702 m)   Wt (!) 209 kg (460 lb)   SpO2 95%   BMI 72 05 kg/m²     Physical Exam     Christiano Kyle MD

## 2022-10-24 NOTE — ASSESSMENT & PLAN NOTE
Wt Readings from Last 3 Encounters:   10/24/22 (!) 209 kg (460 lb)   10/28/21 (!) 184 kg (406 lb)   09/19/21 (!) 193 kg (425 lb)     Will resend  Consult for cardiology

## 2022-10-24 NOTE — PATIENT INSTRUCTIONS
Medicare Preventive Visit Patient Instructions  Thank you for completing your Welcome to Medicare Visit or Medicare Annual Wellness Visit today  Your next wellness visit will be due in one year (10/25/2023)  The screening/preventive services that you may require over the next 5-10 years are detailed below  Some tests may not apply to you based off risk factors and/or age  Screening tests ordered at today's visit but not completed yet may show as past due  Also, please note that scanned in results may not display below  Preventive Screenings:  Service Recommendations Previous Testing/Comments   Colorectal Cancer Screening  · Colonoscopy    · Fecal Occult Blood Test (FOBT)/Fecal Immunochemical Test (FIT)  · Fecal DNA/Cologuard Test  · Flexible Sigmoidoscopy Age: 39-70 years old   Colonoscopy: every 10 years (May be performed more frequently if at higher risk)  OR  FOBT/FIT: every 1 year  OR  Cologuard: every 3 years  OR  Sigmoidoscopy: every 5 years  Screening may be recommended earlier than age 39 if at higher risk for colorectal cancer  Also, an individualized decision between you and your healthcare provider will decide whether screening between the ages of 74-80 would be appropriate   Colonoscopy: Not on file  FOBT/FIT: Not on file  Cologuard: Not on file  Sigmoidoscopy: Not on file    Risks and Benefits Discussed  Due for Cologuard     Prostate Cancer Screening Individualized decision between patient and health care provider in men between ages of 53-78   Medicare will cover every 12 months beginning on the day after your 50th birthday PSA: No results in last 5 years     Risks and Benefits Discussed  Due for PSA     Hepatitis C Screening Once for adults born between 1945 and 1965  More frequently in patients at high risk for Hepatitis C Hep C Antibody: 06/08/2021    Screening Current   Diabetes Screening 1-2 times per year if you're at risk for diabetes or have pre-diabetes Fasting glucose: 123 mg/dL (6/21/2021)  A1C: 5 4 % of total Hgb (5/9/2019)      Cholesterol Screening Once every 5 years if you don't have a lipid disorder  May order more often based on risk factors  Lipid panel: 06/08/2021  Screening Not Indicated  History Lipid Disorder      Other Preventive Screenings Covered by Medicare:  1  Abdominal Aortic Aneurysm (AAA) Screening: covered once if your at risk  You're considered to be at risk if you have a family history of AAA or a male between the age of 73-68 who smoking at least 100 cigarettes in your lifetime  2  Lung Cancer Screening: covers low dose CT scan once per year if you meet all of the following conditions: (1) Age 50-69; (2) No signs or symptoms of lung cancer; (3) Current smoker or have quit smoking within the last 15 years; (4) You have a tobacco smoking history of at least 20 pack years (packs per day x number of years you smoked); (5) You get a written order from a healthcare provider  3  Glaucoma Screening: covered annually if you're considered high risk: (1) You have diabetes OR (2) Family history of glaucoma OR (3)  aged 48 and older OR (3)  American aged 72 and older  3  Osteoporosis Screening: covered every 2 years if you meet one of the following conditions: (1) Have a vertebral abnormality; (2) On glucocorticoid therapy for more than 3 months; (3) Have primary hyperparathyroidism; (4) On osteoporosis medications and need to assess response to drug therapy  5  HIV Screening: covered annually if you're between the age of 12-76  Also covered annually if you are younger than 13 and older than 72 with risk factors for HIV infection  For pregnant patients, it is covered up to 3 times per pregnancy      Immunizations:  Immunization Recommendations   Influenza Vaccine Annual influenza vaccination during flu season is recommended for all persons aged >= 6 months who do not have contraindications   Pneumococcal Vaccine   * Pneumococcal conjugate vaccine = PCV13 (Prevnar 13), PCV15 (Vaxneuvance), PCV20 (Prevnar 20)  * Pneumococcal polysaccharide vaccine = PPSV23 (Pneumovax) Adults 2364 years old: 1-3 doses may be recommended based on certain risk factors  Adults 72 years old: 1-2 doses may be recommended based off what pneumonia vaccine you previously received   Hepatitis B Vaccine 3 dose series if at intermediate or high risk (ex: diabetes, end stage renal disease, liver disease)   Tetanus (Td) Vaccine - COST NOT COVERED BY MEDICARE PART B Following completion of primary series, a booster dose should be given every 10 years to maintain immunity against tetanus  Td may also be given as tetanus wound prophylaxis  Tdap Vaccine - COST NOT COVERED BY MEDICARE PART B Recommended at least once for all adults  For pregnant patients, recommended with each pregnancy  Shingles Vaccine (Shingrix) - COST NOT COVERED BY MEDICARE PART B  2 shot series recommended in those aged 48 and above     Health Maintenance Due:      Topic Date Due   • Colorectal Cancer Screening  Never done   • HIV Screening  11/24/2022 (Originally 10/7/1975)   • Hepatitis C Screening  Completed     Immunizations Due:      Topic Date Due   • COVID-19 Vaccine (4 - Booster for Pfizer series) 03/13/2022   • Influenza Vaccine (1) 09/01/2022     Advance Directives   What are advance directives? Advance directives are legal documents that state your wishes and plans for medical care  These plans are made ahead of time in case you lose your ability to make decisions for yourself  Advance directives can apply to any medical decision, such as the treatments you want, and if you want to donate organs  What are the types of advance directives? There are many types of advance directives, and each state has rules about how to use them  You may choose a combination of any of the following:  · Living will: This is a written record of the treatment you want   You can also choose which treatments you do not want, which to limit, and which to stop at a certain time  This includes surgery, medicine, IV fluid, and tube feedings  · Durable power of  for healthcare [facility]): This is a written record that states who you want to make healthcare choices for you when you are unable to make them for yourself  This person, called a proxy, is usually a family member or a friend  You may choose more than 1 proxy  · Do not resuscitate (DNR) order:  A DNR order is used in case your heart stops beating or you stop breathing  It is a request not to have certain forms of treatment, such as CPR  A DNR order may be included in other types of advance directives  · Medical directive: This covers the care that you want if you are in a coma, near death, or unable to make decisions for yourself  You can list the treatments you want for each condition  Treatment may include pain medicine, surgery, blood transfusions, dialysis, IV or tube feedings, and a ventilator (breathing machine)  · Values history: This document has questions about your views, beliefs, and how you feel and think about life  This information can help others choose the care that you would choose  Why are advance directives important? An advance directive helps you control your care  Although spoken wishes may be used, it is better to have your wishes written down  Spoken wishes can be misunderstood, or not followed  Treatments may be given even if you do not want them  An advance directive may make it easier for your family to make difficult choices about your care  Weight Management   Why it is important to manage your weight:  Being overweight increases your risk of health conditions such as heart disease, high blood pressure, type 2 diabetes, and certain types of cancer  It can also increase your risk for osteoarthritis, sleep apnea, and other respiratory problems  Aim for a slow, steady weight loss   Even a small amount of weight loss can lower your risk of health problems  How to lose weight safely:  A safe and healthy way to lose weight is to eat fewer calories and get regular exercise  You can lose up about 1 pound a week by decreasing the number of calories you eat by 500 calories each day  Healthy meal plan for weight management:  A healthy meal plan includes a variety of foods, contains fewer calories, and helps you stay healthy  A healthy meal plan includes the following:  · Eat whole-grain foods more often  A healthy meal plan should contain fiber  Fiber is the part of grains, fruits, and vegetables that is not broken down by your body  Whole-grain foods are healthy and provide extra fiber in your diet  Some examples of whole-grain foods are whole-wheat breads and pastas, oatmeal, brown rice, and bulgur  · Eat a variety of vegetables every day  Include dark, leafy greens such as spinach, kale, herminio greens, and mustard greens  Eat yellow and orange vegetables such as carrots, sweet potatoes, and winter squash  · Eat a variety of fruits every day  Choose fresh or canned fruit (canned in its own juice or light syrup) instead of juice  Fruit juice has very little or no fiber  · Eat low-fat dairy foods  Drink fat-free (skim) milk or 1% milk  Eat fat-free yogurt and low-fat cottage cheese  Try low-fat cheeses such as mozzarella and other reduced-fat cheeses  · Choose meat and other protein foods that are low in fat  Choose beans or other legumes such as split peas or lentils  Choose fish, skinless poultry (chicken or turkey), or lean cuts of red meat (beef or pork)  Before you cook meat or poultry, cut off any visible fat  · Use less fat and oil  Try baking foods instead of frying them  Add less fat, such as margarine, sour cream, regular salad dressing and mayonnaise to foods  Eat fewer high-fat foods  Some examples of high-fat foods include french fries, doughnuts, ice cream, and cakes  · Eat fewer sweets    Limit foods and drinks that are high in sugar  This includes candy, cookies, regular soda, and sweetened drinks  Exercise:  Exercise at least 30 minutes per day on most days of the week  Some examples of exercise include walking, biking, dancing, and swimming  You can also fit in more physical activity by taking the stairs instead of the elevator or parking farther away from stores  Ask your healthcare provider about the best exercise plan for you  © Copyright [employer] 2018 Information is for End User's use only and may not be sold, redistributed or otherwise used for commercial purposes   All illustrations and images included in CareNotes® are the copyrighted property of [employer]  or [address] [place], [place] [address]

## 2022-11-28 DIAGNOSIS — Z76.0 ENCOUNTER FOR ISSUE OF REPEAT PRESCRIPTION: ICD-10-CM

## 2022-11-28 RX ORDER — ATORVASTATIN CALCIUM 20 MG/1
TABLET, FILM COATED ORAL
Qty: 60 TABLET | Refills: 6 | Status: SHIPPED | OUTPATIENT
Start: 2022-11-28

## 2023-01-21 DIAGNOSIS — I50.31 ACUTE DIASTOLIC HEART FAILURE (HCC): ICD-10-CM

## 2023-01-23 ENCOUNTER — TELEPHONE (OUTPATIENT)
Dept: OTHER | Facility: OTHER | Age: 63
End: 2023-01-23

## 2023-01-23 RX ORDER — BUMETANIDE 2 MG/1
TABLET ORAL
Qty: 120 TABLET | Refills: 3 | Status: SHIPPED | OUTPATIENT
Start: 2023-01-23

## 2023-01-23 NOTE — TELEPHONE ENCOUNTER
Patient is calling regarding cancelling an appointment      Date/Time:  1/23/23   1120    Patient was rescheduled: YES [] NO [x]    Patient requesting call back to reschedule: YES [x] NO []

## 2023-01-24 DIAGNOSIS — I50.31 ACUTE DIASTOLIC HEART FAILURE (HCC): ICD-10-CM

## 2023-03-03 ENCOUNTER — OFFICE VISIT (OUTPATIENT)
Dept: CARDIOLOGY CLINIC | Facility: CLINIC | Age: 63
End: 2023-03-03

## 2023-03-03 VITALS
SYSTOLIC BLOOD PRESSURE: 122 MMHG | HEIGHT: 67 IN | DIASTOLIC BLOOD PRESSURE: 88 MMHG | OXYGEN SATURATION: 96 % | BODY MASS INDEX: 49.44 KG/M2 | TEMPERATURE: 98.8 F | WEIGHT: 315 LBS

## 2023-03-03 DIAGNOSIS — R60.0 BILATERAL LOWER EXTREMITY EDEMA: ICD-10-CM

## 2023-03-03 DIAGNOSIS — I50.32 CHRONIC DIASTOLIC (CONGESTIVE) HEART FAILURE (HCC): Primary | ICD-10-CM

## 2023-03-03 DIAGNOSIS — E66.01 MORBID OBESITY (HCC): ICD-10-CM

## 2023-03-03 DIAGNOSIS — I10 ESSENTIAL HYPERTENSION: ICD-10-CM

## 2023-03-03 DIAGNOSIS — I50.31 ACUTE DIASTOLIC HEART FAILURE (HCC): ICD-10-CM

## 2023-03-03 DIAGNOSIS — E78.2 MIXED HYPERLIPIDEMIA: ICD-10-CM

## 2023-03-03 DIAGNOSIS — I50.32 CHRONIC DIASTOLIC HEART FAILURE (HCC): ICD-10-CM

## 2023-03-03 RX ORDER — BUMETANIDE 2 MG/1
2 TABLET ORAL DAILY
Qty: 120 TABLET | Refills: 3
Start: 2023-03-03

## 2023-03-03 NOTE — PROGRESS NOTES
Cardiology Follow Up    Cassius Adina  [id number]  1960  [facility] [address] [address] [phone]      1  Chronic diastolic (congestive) heart failure (HCC)  POCT ECG      2  Essential hypertension  POCT ECG      3  Mixed hyperlipidemia  POCT ECG      4  Morbid obesity ([address] )  POCT ECG      5  Bilateral lower extremity edema  POCT ECG      6  Chronic diastolic heart failure [facility])  Ambulatory Referral to Cardiology    POCT ECG      7  Acute diastolic heart failure (HCC)  bumetanide (BUMEX) 2 mg tablet          Discussion/Summary:    Diastolic congestive heart failure  Last seen by me in 2021 prior to admission for decompensated heart failure  He had a follow-up visit with our nurse practitioner after discharge, but has not been seen in over a year  He is also not had any blood work done  He is on 2 mg of Bumex which is prescribed to be taken twice daily, but he is taking daily  He does not weigh himself regularly  He he is in a wheelchair was not able to get on the scale today in the office  Denies any change in his edema  He says overall his breathing is stable aside from what he attributes to his morbid obesity  Advised him that he needs to get his blood work done to look at renal function and electrolytes especially being on the Bumex  Would not make any adjustments to his medications at this time  Previously, echo was poor quality but his EF overall is preserved  Blood pressure controlled with his current regimen  Discussed sodium restriction  His wife does a lot of the cooking  He keeps to less than 48 ounces of fluid a day  History of Present Illness:    Diastolic congestive heart failure  Morbid obesity  I had seen him when he was admitted to the hospital, but no follow-up since 2021  Prior to the office today, overdue for follow-up  He is mostly in the house   When he leaves, he is in a wheelchair  He does some walking around the house  Sleeps in a recliner mostly because of his legs  Denies rest symptoms of shortness of breath  Edema is mostly stable  He has been on 2 mg of Bumex  This is prescribed to be taken twice daily, but he says he is just taking it daily  No blood work has been done in over a year  He does have prescription for this from his primary care physician, but has not gotten it  Otherwise, denies any chest pain or palpitations  No recent cardiac testing  He had an echo when he was admitted to the hospital in 2021 and his EF was preserved without any significant valvular disease  Given his body habitus, this is a very technically challenging study        Patient Active Problem List   Diagnosis   • Primary osteoarthritis of both knees   • Essential hypertension   • Mixed hyperlipidemia   • Morbid obesity ([facility] [address] )   • CASSIE (obstructive sleep apnea)   • Vitamin D deficiency   • Ambulatory dysfunction   • Bilateral lower extremity edema   • Venous stasis ulcers of both lower extremities ([facility] [address] )   • Former smoker   • Edema of left upper arm   • GENOVEVA (acute kidney injury) ([facility] [address] )   • Chronic diastolic (congestive) heart failure (HCC)   • History of atrial fibrillation   • Abnormal renal function   • Chronic pain of both knees   • Gait instability     Past Medical History:   Diagnosis Date   • Arthritis    • Atrial fibrillation ([facility] [address] )    • Essential hypertension    • Hyperlipidemia    • Hypertension    • Lower extremity edema    • Obesity    • CASSIE (obstructive sleep apnea)    • Osteoarthritis of both knees    • PSVT (paroxysmal supraventricular tachycardia) ([facility])    • Vitamin D deficiency      Social History     Tobacco Use   • Smoking status: Former     Packs/day: 1 00     Years: 15 00     Pack years: 15 00     Types: Cigarettes     Start date: 2006     Quit date: 2021     Years since quittin 1   • Smokeless tobacco: Never   Vaping Use   • Vaping Use: Never used Substance Use Topics   • Alcohol use: Not Currently     Alcohol/week: 0 0 standard drinks     Comment: occ per jacklyn    • Drug use: Never      Family History   Problem Relation Age of Onset   • Diabetes Mother    • Alzheimer's disease Mother    • Heart disease Father    • Arthritis Father    • Asthma Sister    • No Known Problems Brother    • No Known Problems Sister    • No Known Problems Sister      Past Surgical History:   Procedure Laterality Date   • ATRIAL ABLATION SURGERY     • KNEE SURGERY         Current Outpatient Medications:   •  aspirin (ECOTRIN LOW STRENGTH) 81 mg EC tablet, Take 81 mg by mouth daily, Disp: , Rfl:   •  atorvastatin (LIPITOR) 20 mg tablet, TAKE 1 TABLET AT BEDTIME, Disp: 60 tablet, Rfl: 6  •  bumetanide (BUMEX) 2 mg tablet, Take 1 tablet (2 mg total) by mouth daily, Disp: 120 tablet, Rfl: 3  •  calcium carbonate (TUMS) 500 mg chewable tablet, Chew 2 tablets (1,000 mg total) daily as needed for indigestion or heartburn, Disp: 30 tablet, Rfl: 0  •  metoprolol tartrate (LOPRESSOR) 25 mg tablet, TAKE 1 TABLET EVERY 12 HOURS, Disp: 180 tablet, Rfl: 0  •  Misc  Devices (Wheelchair) MISC, Use daily, Disp: 1 each, Rfl: 0  •  potassium chloride (K-DUR,KLOR-CON) 20 mEq tablet, TAKE 1 TABLET TWICE DAILY, Disp: 180 tablet, Rfl: 1  •  senna (SENOKOT) 8 6 mg, Take 1 tablet (8 6 mg total) by mouth daily, Disp: 30 tablet, Rfl: 0  No Known Allergies    Vitals:    03/03/23 1510   BP: 122/88   BP Location: Left arm   Patient Position: Sitting   Cuff Size: Extra-Large   Temp: 98 8 °F (37 1 °C)   SpO2: 96%   Weight: (!) 209 kg (460 lb)   Height: 5' 7" (1 702 m)     Vitals:    03/03/23 1510   Weight: (!) 209 kg (460 lb)      Height: 5' 7" (170 2 cm)   Body mass index is 72 05 kg/m²      Physical Exam:  GEN: John Class appears well, alert and oriented x 3, pleasant and cooperative   HEENT: pupils equal, round, and reactive to light; extraocular muscles intact  NECK: supple, no carotid bruits   HEART: regular rhythm, normal S1 and S2, no murmurs, clicks, gallops or rubs   LUNGS: clear to auscultation bilaterally; no wheezes, rales, or rhonchi   ABDOMEN: morbidly obese  EXTREMITIES: 1+ edema  Dry skin  NEURO: in wheelchair  SKIN: normal without suspicious lesions on exposed skin    ROS:  Positive for shortness of breath, weight gain, edema, chest tightness  Except as noted in HPI, is otherwise reviewed in detail and a 12 point review of systems is negative  ROS reviewed and is unchanged    Labs:  Lab Results   Component Value Date    SODIUM 134 (L) 09/19/2021    K 3 2 (L) 09/19/2021    CL 96 (L) 09/19/2021    CREATININE 1 04 09/19/2021    BUN 15 09/19/2021    CO2 30 09/19/2021    ALT 15 09/19/2021    AST 13 09/19/2021    GLUF 123 (H) 06/21/2021    HGBA1C 5 4 05/09/2019    WBC 13 78 (H) 09/19/2021    HGB 12 9 09/19/2021    HCT 40 1 09/19/2021     09/19/2021     Echo 6/8/21:  LEFT VENTRICLE:  Systolic function was normal  Ejection fraction was estimated to be 60 %  Although no diagnostic regional wall motion abnormality was identified, this possibility cannot be completely excluded on the basis of this study  Wall thickness was mildly increased      LEFT ATRIUM:  The atrium was dilated  EKG:  Sinus rhythm   69 bpm

## 2023-06-06 ENCOUNTER — VBI (OUTPATIENT)
Dept: ADMINISTRATIVE | Facility: OTHER | Age: 63
End: 2023-06-06

## 2023-07-03 DIAGNOSIS — I50.31 ACUTE DIASTOLIC HEART FAILURE (HCC): ICD-10-CM

## 2023-07-03 RX ORDER — POTASSIUM CHLORIDE 20 MEQ/1
TABLET, EXTENDED RELEASE ORAL
Qty: 180 TABLET | Refills: 1 | Status: SHIPPED | OUTPATIENT
Start: 2023-07-03

## 2023-08-08 ENCOUNTER — VBI (OUTPATIENT)
Dept: ADMINISTRATIVE | Facility: OTHER | Age: 63
End: 2023-08-08

## 2023-08-23 DIAGNOSIS — I50.31 ACUTE DIASTOLIC HEART FAILURE (HCC): ICD-10-CM

## 2023-09-15 DIAGNOSIS — I50.31 ACUTE DIASTOLIC HEART FAILURE (HCC): ICD-10-CM

## 2023-09-15 RX ORDER — BUMETANIDE 2 MG/1
2 TABLET ORAL 2 TIMES DAILY
Qty: 60 TABLET | Refills: 0 | Status: SHIPPED | OUTPATIENT
Start: 2023-09-15

## 2023-10-18 ENCOUNTER — APPOINTMENT (OUTPATIENT)
Dept: LAB | Facility: HOSPITAL | Age: 63
End: 2023-10-18
Attending: FAMILY MEDICINE
Payer: COMMERCIAL

## 2023-10-18 DIAGNOSIS — Z12.5 PROSTATE CANCER SCREENING: ICD-10-CM

## 2023-10-18 DIAGNOSIS — I10 PRIMARY HYPERTENSION: ICD-10-CM

## 2023-10-18 DIAGNOSIS — E78.5 DYSLIPIDEMIA: ICD-10-CM

## 2023-10-18 LAB
ALBUMIN SERPL BCP-MCNC: 3.8 G/DL (ref 3.5–5)
ALP SERPL-CCNC: 85 U/L (ref 34–104)
ALT SERPL W P-5'-P-CCNC: 10 U/L (ref 7–52)
ANION GAP SERPL CALCULATED.3IONS-SCNC: 11 MMOL/L
AST SERPL W P-5'-P-CCNC: 12 U/L (ref 13–39)
BILIRUB SERPL-MCNC: 0.42 MG/DL (ref 0.2–1)
BUN SERPL-MCNC: 20 MG/DL (ref 5–25)
CALCIUM SERPL-MCNC: 9.4 MG/DL (ref 8.4–10.2)
CHLORIDE SERPL-SCNC: 99 MMOL/L (ref 96–108)
CHOLEST SERPL-MCNC: 212 MG/DL
CO2 SERPL-SCNC: 28 MMOL/L (ref 21–32)
CREAT SERPL-MCNC: 0.84 MG/DL (ref 0.6–1.3)
GFR SERPL CREATININE-BSD FRML MDRD: 93 ML/MIN/1.73SQ M
GLUCOSE P FAST SERPL-MCNC: 236 MG/DL (ref 65–99)
HDLC SERPL-MCNC: 40 MG/DL
NONHDLC SERPL-MCNC: 172 MG/DL
POTASSIUM SERPL-SCNC: 4.3 MMOL/L (ref 3.5–5.3)
PROT SERPL-MCNC: 7.5 G/DL (ref 6.4–8.4)
PSA SERPL-MCNC: 0.53 NG/ML (ref 0–4)
SODIUM SERPL-SCNC: 138 MMOL/L (ref 135–147)
TRIGL SERPL-MCNC: 428 MG/DL

## 2023-10-18 PROCEDURE — G0103 PSA SCREENING: HCPCS

## 2023-10-18 PROCEDURE — 36415 COLL VENOUS BLD VENIPUNCTURE: CPT

## 2023-10-18 PROCEDURE — 80061 LIPID PANEL: CPT

## 2023-10-18 PROCEDURE — 80053 COMPREHEN METABOLIC PANEL: CPT

## 2023-10-23 ENCOUNTER — RA CDI HCC (OUTPATIENT)
Dept: OTHER | Facility: HOSPITAL | Age: 63
End: 2023-10-23

## 2023-10-23 NOTE — PROGRESS NOTES
[address] [facility] coding opportunities          Chart Reviewed number of suggestions sent to Provider: 1    I11.0     Patients Insurance     Medicare Insurance: [facility] Advantage

## 2023-11-02 ENCOUNTER — OFFICE VISIT (OUTPATIENT)
Dept: CARDIOLOGY CLINIC | Facility: CLINIC | Age: 63
End: 2023-11-02
Payer: COMMERCIAL

## 2023-11-02 VITALS
SYSTOLIC BLOOD PRESSURE: 124 MMHG | BODY MASS INDEX: 49.44 KG/M2 | DIASTOLIC BLOOD PRESSURE: 70 MMHG | HEART RATE: 58 BPM | OXYGEN SATURATION: 97 % | WEIGHT: 315 LBS | TEMPERATURE: 97.6 F | HEIGHT: 67 IN

## 2023-11-02 DIAGNOSIS — I10 ESSENTIAL HYPERTENSION: ICD-10-CM

## 2023-11-02 DIAGNOSIS — I50.31 ACUTE DIASTOLIC HEART FAILURE (HCC): ICD-10-CM

## 2023-11-02 DIAGNOSIS — E78.2 MIXED HYPERLIPIDEMIA: ICD-10-CM

## 2023-11-02 DIAGNOSIS — I50.32 CHRONIC DIASTOLIC (CONGESTIVE) HEART FAILURE (HCC): Primary | ICD-10-CM

## 2023-11-02 PROCEDURE — 99214 OFFICE O/P EST MOD 30 MIN: CPT | Performed by: INTERNAL MEDICINE

## 2023-11-02 RX ORDER — BUMETANIDE 2 MG/1
2 TABLET ORAL DAILY
Qty: 60 TABLET | Refills: 0
Start: 2023-11-02

## 2023-11-02 NOTE — PROGRESS NOTES
Cardiology Follow Up    Satish Ortiz  [id number]  1960  [facility] [address] [facility]  [address] [address]  [phone]      1. Chronic diastolic (congestive) heart failure ([address])        2. Mixed hyperlipidemia        3. Essential hypertension        4. Acute diastolic heart failure (HCC)  bumetanide (BUMEX) 2 mg tablet          Discussion/Summary:    With regards to diastolic heart failure, his Bumex dose has remained the same. He takes this daily. His edema remains stable from last time. He does take potassium 20 mEq twice a day. His recent blood work shows normal renal function and electrolytes. I would continue the same diuretic regimen. Last echo was very poor quality given his body habitus. There has been no clinical change, I see no utility in repeating an echo at this time. He has been diagnosed with diabetes based on his most recent blood work. Prior to this, there had been a pretty big gap in getting his blood work done on the patient's end. We discussed the diabetes diagnosis, and he does have some familiarity with this because his wife is diabetic, as well. From cardiac standpoint, he is already on atorvastatin. As his triglycerides were markedly elevated in the setting of his abnormal glucose, the LDL was not able to be calculated. Lipid panel should be rechecked in the future after better glucose control is obtained and a direct LDL would be recommended. We did discuss diabetes is a significant risk factor for coronary artery disease but he remains asymptomatic currently and on appropriate medication regimen including a baby aspirin which we opted to continue. He is going to be seeing his primary care physician in 4 days and will likely have repeat blood work and be initiated on medication for diabetes.     Since he is going to be seeing his primary care physician regularly now, I indicated I can see him back in a year unless there were changes in his volume status or new symptoms arose. History of Present Illness:    Diastolic congestive heart failure. Morbid obesity. I had seen him initially when he was admitted to the hospital, but no follow-up since 2021. He is mostly in the house. When he leaves, he is in a wheelchair. He does some walking around the house. Sleeps in a recliner mostly because of his legs. Denies rest symptoms of shortness of breath. Edema is mostly stable. He has been on 2 mg of Bumex. Still taking it daily    No recent cardiac testing. He had an echo when he was admitted to the hospital in June 2021 and his EF was preserved without any significant valvular disease. Given his body habitus, this is a very technically challenging study. Interval history. He has remained on the same doses of medications. He recently had blood work done which was ordered by his primary care physician. His glucose was pretty significantly elevated. He has never been diagnosed with diabetes, but he is familiar with the diagnosis because his wife is. He has follow-up with his primary care physician within the coming week to discuss these. His lipid panel showed elevated triglycerides, but again this was likely secondary to the elevated glucose. There was no direct LDL. Blood pressure has been stable. He stood on the scale today and it was 433. Last time, not sure he was able to stand that might have been a carried forward weight. Overall, he does not notice any major changes in edema. Dyspnea stable. His level of activity remains pretty low.     Patient Active Problem List   Diagnosis   • Primary osteoarthritis of both knees   • Essential hypertension   • Mixed hyperlipidemia   • Morbid obesity ([address])   • CASSIE (obstructive sleep apnea)   • Vitamin D deficiency   • Ambulatory dysfunction   • Bilateral lower extremity edema   • Venous stasis ulcers of both lower extremities ([address])   • Former smoker   • Edema of left upper arm   • GENOVEVA (acute kidney injury) ([address])   • Chronic diastolic (congestive) heart failure (HCC)   • History of atrial fibrillation   • Abnormal renal function   • Chronic pain of both knees   • Gait instability     Past Medical History:   Diagnosis Date   • Arthritis    • Atrial fibrillation ([address])    • Essential hypertension    • Hyperlipidemia    • Hypertension    • Lower extremity edema    • Obesity    • CASSIE (obstructive sleep apnea)    • Osteoarthritis of both knees    • PSVT (paroxysmal supraventricular tachycardia)    • Vitamin D deficiency      Social History     Tobacco Use   • Smoking status: Former     Packs/day: 1.00     Years: 15.00     Total pack years: 15.00     Types: Cigarettes     Start date: 2006     Quit date: 2021     Years since quittin.8   • Smokeless tobacco: Never   Vaping Use   • Vaping Use: Never used   Substance Use Topics   • Alcohol use: Not Currently     Alcohol/week: 0.0 standard drinks of alcohol     Comment: occ per jacklyn    • Drug use: Never      Family History   Problem Relation Age of Onset   • Diabetes Mother    • Alzheimer's disease Mother    • Heart disease Father    • Arthritis Father    • Asthma Sister    • No Known Problems Brother    • No Known Problems Sister    • No Known Problems Sister      Past Surgical History:   Procedure Laterality Date   • ATRIAL ABLATION SURGERY     • KNEE SURGERY         Current Outpatient Medications:   •  aspirin (ECOTRIN LOW STRENGTH) 81 mg EC tablet, Take 81 mg by mouth daily, Disp: , Rfl:   •  atorvastatin (LIPITOR) 20 mg tablet, TAKE 1 TABLET AT BEDTIME, Disp: 60 tablet, Rfl: 6  •  bumetanide (BUMEX) 2 mg tablet, Take 1 tablet (2 mg total) by mouth daily, Disp: 60 tablet, Rfl: 0  •  calcium carbonate (TUMS) 500 mg chewable tablet, Chew 2 tablets (1,000 mg total) daily as needed for indigestion or heartburn, Disp: 30 tablet, Rfl: 0  •  metoprolol tartrate (LOPRESSOR) 25 mg tablet, TAKE 1 TABLET EVERY 12 HOURS, Disp: 180 tablet, Rfl: 0  •  Misc. Devices (Wheelchair) MISC, Use daily, Disp: 1 each, Rfl: 0  •  potassium chloride (K-DUR,KLOR-CON) 20 mEq tablet, TAKE 1 TABLET TWICE DAILY, Disp: 180 tablet, Rfl: 1  •  senna (SENOKOT) 8.6 mg, Take 1 tablet (8.6 mg total) by mouth daily, Disp: 30 tablet, Rfl: 0  No Known Allergies    Vitals:    11/02/23 0924   BP: 124/70   BP Location: Left arm   Patient Position: Sitting   Cuff Size: Large   Pulse: 58   Temp: 97.6 °F (36.4 °C)   SpO2: 97%   Weight: (!) 196 kg (433 lb)   Height: 5' 7" (1.702 m)     Vitals:    11/02/23 0924   Weight: (!) 196 kg (433 lb)      Height: 5' 7" (170.2 cm)   Body mass index is 67.82 kg/m². Physical Exam:  GEN: Barbara Alfred appears well, alert and oriented x 3, pleasant and cooperative   HEENT: pupils equal, round, and reactive to light; extraocular muscles intact  NECK: supple, no carotid bruits   HEART: regular rhythm, normal S1 and S2, no murmurs, clicks, gallops or rubs   LUNGS: clear to auscultation bilaterally; no wheezes, rales, or rhonchi   ABDOMEN: normal bowel sounds, soft, no tenderness, no distention  EXTREMITIES: 1+ edema, dry skin. NEURO: in wheelchair  SKIN: dry skin,    ROS:  Positive for shortness of breath, weight gain, edema, chest tightness. Except as noted in HPI, is otherwise reviewed in detail and a 12 point review of systems is negative. ROS reviewed and is unchanged    Labs:  Lab Results   Component Value Date    SODIUM 138 10/18/2023    K 4.3 10/18/2023    CL 99 10/18/2023    CREATININE 0.84 10/18/2023    BUN 20 10/18/2023    CO2 28 10/18/2023    ALT 10 10/18/2023    AST 12 (L) 10/18/2023    GLUF 236 (H) 10/18/2023    HGBA1C 5.4 05/09/2019    WBC 13.78 (H) 09/19/2021    HGB 12.9 09/19/2021    HCT 40.1 09/19/2021     09/19/2021     Echo 6/8/21:  LEFT VENTRICLE:  Systolic function was normal. Ejection fraction was estimated to be 60 %.   Although no diagnostic regional wall motion abnormality was identified, this possibility cannot be completely excluded on the basis of this study. Wall thickness was mildly increased. LEFT ATRIUM:  The atrium was dilated.

## 2023-11-04 DIAGNOSIS — I50.31 ACUTE DIASTOLIC HEART FAILURE (HCC): ICD-10-CM

## 2023-11-06 ENCOUNTER — OFFICE VISIT (OUTPATIENT)
Dept: FAMILY MEDICINE CLINIC | Facility: CLINIC | Age: 63
End: 2023-11-06
Payer: COMMERCIAL

## 2023-11-06 VITALS
OXYGEN SATURATION: 96 % | BODY MASS INDEX: 49.44 KG/M2 | TEMPERATURE: 97.8 F | RESPIRATION RATE: 16 BRPM | DIASTOLIC BLOOD PRESSURE: 80 MMHG | WEIGHT: 315 LBS | SYSTOLIC BLOOD PRESSURE: 128 MMHG | HEART RATE: 58 BPM | HEIGHT: 67 IN

## 2023-11-06 DIAGNOSIS — I50.32 CHRONIC DIASTOLIC (CONGESTIVE) HEART FAILURE (HCC): ICD-10-CM

## 2023-11-06 DIAGNOSIS — E11.65 TYPE 2 DIABETES MELLITUS WITH HYPERGLYCEMIA, WITHOUT LONG-TERM CURRENT USE OF INSULIN (HCC): ICD-10-CM

## 2023-11-06 DIAGNOSIS — I10 ESSENTIAL HYPERTENSION: ICD-10-CM

## 2023-11-06 DIAGNOSIS — Z12.11 COLON CANCER SCREENING: ICD-10-CM

## 2023-11-06 DIAGNOSIS — Z23 ENCOUNTER FOR IMMUNIZATION: Primary | ICD-10-CM

## 2023-11-06 DIAGNOSIS — E78.2 MIXED HYPERLIPIDEMIA: ICD-10-CM

## 2023-11-06 DIAGNOSIS — Z00.00 MEDICARE ANNUAL WELLNESS VISIT, SUBSEQUENT: ICD-10-CM

## 2023-11-06 DIAGNOSIS — R09.89 DECREASED DORSALIS PEDIS PULSE: ICD-10-CM

## 2023-11-06 LAB
LEFT EYE DIABETIC RETINOPATHY: NORMAL
LEFT EYE IMAGE QUALITY: NORMAL
LEFT EYE MACULAR EDEMA: NORMAL
LEFT EYE OTHER RETINOPATHY: NORMAL
RIGHT EYE DIABETIC RETINOPATHY: NORMAL
RIGHT EYE IMAGE QUALITY: NORMAL
RIGHT EYE MACULAR EDEMA: NORMAL
RIGHT EYE OTHER RETINOPATHY: NORMAL
SEVERITY (EYE EXAM): NORMAL

## 2023-11-06 PROCEDURE — G0008 ADMIN INFLUENZA VIRUS VAC: HCPCS | Performed by: FAMILY MEDICINE

## 2023-11-06 PROCEDURE — 90686 IIV4 VACC NO PRSV 0.5 ML IM: CPT | Performed by: FAMILY MEDICINE

## 2023-11-06 PROCEDURE — 99214 OFFICE O/P EST MOD 30 MIN: CPT | Performed by: FAMILY MEDICINE

## 2023-11-06 PROCEDURE — G0009 ADMIN PNEUMOCOCCAL VACCINE: HCPCS | Performed by: FAMILY MEDICINE

## 2023-11-06 PROCEDURE — G0439 PPPS, SUBSEQ VISIT: HCPCS | Performed by: FAMILY MEDICINE

## 2023-11-06 PROCEDURE — 90677 PCV20 VACCINE IM: CPT | Performed by: FAMILY MEDICINE

## 2023-11-06 RX ORDER — BUMETANIDE 2 MG/1
2 TABLET ORAL 2 TIMES DAILY
Qty: 180 TABLET | Refills: 1 | Status: SHIPPED | OUTPATIENT
Start: 2023-11-06

## 2023-11-06 NOTE — PROGRESS NOTES
Assessment and Plan:     Problem List Items Addressed This Visit        Endocrine    Type 2 diabetes mellitus with hyperglycemia, without long-term current use of insulin ([address])       Lab Results   Component Value Date    HGBA1C 5.4 05/09/2019   Will check HGA1c and urine  albumin new diagnosis start metformin 500mg po bid repeat random glucose in 2 weeks         Relevant Medications    metFORMIN (GLUCOPHAGE) 500 mg tablet    Other Relevant Orders    IRIS Diabetic eye exam    Hemoglobin C5V    Basic metabolic panel    Albumin / creatinine urine ratio       Cardiovascular and Mediastinum    Essential hypertension     Well controlled on current therapy continue with current medications and will reassess next visit           Chronic diastolic (congestive) heart failure ([address])     Wt Readings from Last 3 Encounters:   11/06/23 (!) 196 kg (433 lb)   11/02/23 (!) 196 kg (433 lb)   03/03/23 (!) 209 kg (460 lb)     Reviewed cardiology note pt ok on diuretics                   Other    Mixed hyperlipidemia     Triiglycerides very high related to high glucose avoid sweets saturated fats          Medicare annual wellness visit, subsequent     Reviewed          Decreased dorsalis pedis pulse     Bilaterally will check dopplers         Relevant Orders    VAS lower limb arterial duplex, complete bilateral   Other Visit Diagnoses     Encounter for immunization    -  Primary    Relevant Orders    influenza vaccine, quadrivalent, 0.5 mL, preservative-free, for adult and pediatric patients 6 mos+ (AFLURIA, FLUARIX, FLULAVAL, FLUZONE) (Completed)    Pneumococcal Conjugate Vaccine 20-valent (Pcv20) (Completed)    Colon cancer screening        Relevant Orders    Cologuard          Depression Screening and Follow-up Plan: Patient was screened for depression during today's encounter. They screened negative with a PHQ-2 score of 0.       Preventive health issues were discussed with patient, and age appropriate screening tests were ordered as noted in patient's After Visit Summary. Personalized health advice and appropriate referrals for health education or preventive services given if needed, as noted in patient's After Visit Summary.      History of Present Illness:     Patient presents for a Medicare Wellness Visit    Diabetes       Patient Care Team:  Pamela Scott MD as PCP - General (Family Medicine)  Cynthia Bartlett DO as PCP - Wound (Wound Care)     Review of Systems:     Review of Systems     Problem List:     Patient Active Problem List   Diagnosis   • Primary osteoarthritis of both knees   • Essential hypertension   • Mixed hyperlipidemia   • Morbid obesity ([address])   • CASSIE (obstructive sleep apnea)   • Vitamin D deficiency   • Ambulatory dysfunction   • Bilateral lower extremity edema   • Venous stasis ulcers of both lower extremities ([address])   • Former smoker   • Edema of left upper arm   • GENOVEVA (acute kidney injury) ([address])   • Chronic diastolic (congestive) heart failure ([address])   • History of atrial fibrillation   • Abnormal renal function   • Chronic pain of both knees   • Gait instability   • Type 2 diabetes mellitus with hyperglycemia, without long-term current use of insulin ([address])   • Medicare annual wellness visit, subsequent   • Decreased dorsalis pedis pulse      Past Medical and Surgical History:     Past Medical History:   Diagnosis Date   • Arthritis    • Atrial fibrillation ([address])    • Essential hypertension    • Hyperlipidemia    • Hypertension    • Lower extremity edema    • Obesity    • CASSIE (obstructive sleep apnea)    • Osteoarthritis of both knees    • PSVT (paroxysmal supraventricular tachycardia)    • Vitamin D deficiency      Past Surgical History:   Procedure Laterality Date   • ATRIAL ABLATION SURGERY     • KNEE SURGERY        Family History:     Family History   Problem Relation Age of Onset   • Diabetes Mother    • Alzheimer's disease Mother    • Heart disease Father    • Arthritis Father    • Asthma Sister    • No Known Problems Brother    • No Known Problems Sister    • No Known Problems Sister       Social History:     Social History     Socioeconomic History   • Marital status: /Civil Union     Spouse name: None   • Number of children: None   • Years of education: 12   • Highest education level: 12th grade   Occupational History   • None   Tobacco Use   • Smoking status: Former     Packs/day: 1.00     Years: 15.00     Total pack years: 15.00     Types: Cigarettes     Start date: 2006     Quit date: 2021     Years since quittin.8   • Smokeless tobacco: Never   Vaping Use   • Vaping Use: Never used   Substance and Sexual Activity   • Alcohol use: Not Currently     Comment: occ per jacklyn    • Drug use: Never   • Sexual activity: Not Currently   Other Topics Concern   • None   Social History Narrative   • None     Social Determinants of Health     Financial Resource Strain: Low Risk  (10/24/2022)    Overall Financial Resource Strain (CARDIA)    • Difficulty of Paying Living Expenses: Not hard at all   Food Insecurity: Not on file   Transportation Needs: No Transportation Needs (10/24/2022)    PRAPARE - Transportation    • Lack of Transportation (Medical): No    • Lack of Transportation (Non-Medical):  No   Physical Activity: Not on file   Stress: Not on file   Social Connections: Not on file   Intimate Partner Violence: Not on file   Housing Stability: Not on file      Medications and Allergies:     Current Outpatient Medications   Medication Sig Dispense Refill   • aspirin (ECOTRIN LOW STRENGTH) 81 mg EC tablet Take 81 mg by mouth daily     • atorvastatin (LIPITOR) 20 mg tablet TAKE 1 TABLET AT BEDTIME 60 tablet 6   • bumetanide (BUMEX) 2 mg tablet TAKE 1 TABLET TWICE DAILY 180 tablet 1   • calcium carbonate (TUMS) 500 mg chewable tablet Chew 2 tablets (1,000 mg total) daily as needed for indigestion or heartburn 30 tablet 0   • metFORMIN (GLUCOPHAGE) 500 mg tablet Take 1 tablet (500 mg total) by mouth 2 (two) times a day with meals 60 tablet 5   • metoprolol tartrate (LOPRESSOR) 25 mg tablet TAKE 1 TABLET EVERY 12 HOURS 180 tablet 0   • Misc. Devices (Wheelchair) MISC Use daily 1 each 0   • potassium chloride (K-DUR,KLOR-CON) 20 mEq tablet TAKE 1 TABLET TWICE DAILY 180 tablet 1   • senna (SENOKOT) 8.6 mg Take 1 tablet (8.6 mg total) by mouth daily 30 tablet 0     No current facility-administered medications for this visit. No Known Allergies   Immunizations:     Immunization History   Administered Date(s) Administered   • COVID-19 PFIZER VACCINE 0.3 ML IM 03/22/2021, 04/15/2021, 11/13/2021   • INFLUENZA 10/24/2022   • Influenza, injectable, quadrivalent, preservative free 0.5 mL 11/06/2023   • Influenza, recombinant, quadrivalent,injectable, preservative free 10/27/2020, 10/28/2021, 10/24/2022   • Pneumococcal Conjugate Vaccine 20-valent (Pcv20), Polysace 11/06/2023   • Pneumococcal Polysaccharide PPV23 10/28/2021      Health Maintenance:         Topic Date Due   • HIV Screening  Never done   • Colorectal Cancer Screening  Never done   • Hepatitis C Screening  Completed         Topic Date Due   • COVID-19 Vaccine (4 - Pfizer series) 01/08/2022      Medicare Screening Tests and Risk Assessments:     Gladys Shea is here for his Subsequent Wellness visit. Health Risk Assessment:   Patient rates overall health as fair. Patient feels that their physical health rating is same. Patient is very satisfied with their life. Eyesight was rated as same. Hearing was rated as same. Patient feels that their emotional and mental health rating is same. Patients states they are never, rarely angry. Patient states they are never, rarely unusually tired/fatigued. Pain experienced in the last 7 days has been none. Patient states that he has experienced no weight loss or gain in last 6 months. Depression Screening:   PHQ-2 Score: 0      Fall Risk Screening:    In the past year, patient has experienced: no history of falling in past year      Home Safety:  Patient does not have trouble with stairs inside or outside of their home. Patient has working smoke alarms and has working carbon monoxide detector. Home safety hazards include: none. Nutrition:   Current diet is Regular. Medications:   Patient is currently taking over-the-counter supplements. OTC medications include: see medication list. Patient is able to manage medications. Activities of Daily Living (ADLs)/Instrumental Activities of Daily Living (IADLs):   Walk and transfer into and out of bed and chair?: Yes  Dress and groom yourself?: Yes    Bathe or shower yourself?: Yes    Feed yourself?  Yes  Do your laundry/housekeeping?: Yes  Manage your money, pay your bills and track your expenses?: Yes  Make your own meals?: Yes    Do your own shopping?: Yes    Previous Hospitalizations:   Any hospitalizations or ED visits within the last 12 months?: No      Advance Care Planning:   Living will: No    Advanced directive: No      Cognitive Screening:   Provider or family/friend/caregiver concerned regarding cognition?: No    PREVENTIVE SCREENINGS      Cardiovascular Screening:    General: Screening Not Indicated, History Lipid Disorder and Screening Current      Diabetes Screening:     General: Risks and Benefits Discussed    Due for: Blood Glucose      Colorectal Cancer Screening:     General: Risks and Benefits Discussed    Due for: Cologuard      Prostate Cancer Screening:    General: Screening Current      Osteoporosis Screening:    General: Screening Not Indicated      Abdominal Aortic Aneurysm (AAA) Screening:    Risk factors include: tobacco use        General: Screening Not Indicated      Lung Cancer Screening:     General: Screening Not Indicated      Hepatitis C Screening:    General: Screening Current    Screening, Brief Intervention, and Referral to Treatment (SBIRT)    Screening      AUDIT-C Screenin) How often did you have a drink containing alcohol in the past year? never  2) How many drinks did you have on a typical day when you were drinking in the past year? 0  3) How often did you have 6 or more drinks on one occasion in the past year? never    AUDIT-C Score: 0  Interpretation: Score 0-3 (male): Negative screen for alcohol misuse    Single Item Drug Screening:  How often have you used an illegal drug (including marijuana) or a prescription medication for non-medical reasons in the past year? never    Single Item Drug Screen Score: 0  Interpretation: Negative screen for possible drug use disorder    No results found.      Physical Exam:     /80 (BP Location: Left arm, Patient Position: Sitting, Cuff Size: Large)   Pulse 58   Temp 97.8 °F (36.6 °C) (Tympanic)   Resp 16   Ht 5' 7" (1.702 m)   Wt (!) 196 kg (433 lb)   SpO2 96%   BMI 67.82 kg/m²     Physical Exam     Chad French MD

## 2023-11-06 NOTE — ASSESSMENT & PLAN NOTE
Lab Results   Component Value Date    HGBA1C 5.4 05/09/2019   Will check HGA1c and urine  albumin new diagnosis start metformin 500mg po bid repeat random glucose in 2 weeks

## 2023-11-06 NOTE — ASSESSMENT & PLAN NOTE
Wt Readings from Last 3 Encounters:   11/06/23 (!) 196 kg (433 lb)   11/02/23 (!) 196 kg (433 lb)   03/03/23 (!) 209 kg (460 lb)     Reviewed cardiology note pt ok on diuretics

## 2023-11-06 NOTE — PROGRESS NOTES
Name: Hugo Azevedo      : 1960      MRN: [id number]  Encounter Provider: Jacquie Asif MD  Encounter Date: 2023   Encounter department: [address] [place] [address]    Assessment & Plan     1. Encounter for immunization  -     influenza vaccine, quadrivalent, 0.5 mL, preservative-free, for adult and pediatric patients 6 mos+ (AFLURIA, FLUARIX, FLULAVAL, FLUZONE)  -     Pneumococcal Conjugate Vaccine 20-valent (Pcv20)    2. Essential hypertension  Assessment & Plan:  Well controlled on current therapy continue with current medications and will reassess next visit        3. Mixed hyperlipidemia  Assessment & Plan:  Triiglycerides very high related to high glucose avoid sweets saturated fats       4. Type 2 diabetes mellitus with hyperglycemia, without long-term current use of insulin [facility])  Assessment & Plan:    Lab Results   Component Value Date    HGBA1C 5.4 2019   Will check HGA1c and urine  albumin new diagnosis start metformin 500mg po bid repeat random glucose in 2 weeks    Orders:  -     IRIS Diabetic eye exam  -     metFORMIN (GLUCOPHAGE) 500 mg tablet; Take 1 tablet (500 mg total) by mouth 2 (two) times a day with meals  -     Hemoglobin A1C; Future; Expected date: 2023  -     Basic metabolic panel; Future; Expected date: 2023  -     Albumin / creatinine urine ratio; Future; Expected date: 2023    5. Medicare annual wellness visit, subsequent  Assessment & Plan:  Reviewed       6. Colon cancer screening  -     Cologuard    7. Decreased dorsalis pedis pulse  Assessment & Plan:  Bilaterally will check dopplers    Orders:  -     VAS lower limb arterial duplex, complete bilateral; Future; Expected date: 2023    8.  Chronic diastolic (congestive) heart failure [facility])  Assessment & Plan:  Wt Readings from Last 3 Encounters:   23 (!) 196 kg (433 lb)   23 (!) 196 kg (433 lb)   23 (!) 209 kg (460 lb)     Reviewed cardiology note pt ok on diuretics                 Depression Screening and Follow-up Plan: Patient was screened for depression during today's encounter. They screened negative with a PHQ-2 score of 0. Subjective      Diabetes  Pertinent negatives for hypoglycemia include no dizziness, headaches or nervousness/anxiousness. Pertinent negatives for diabetes include no chest pain and no weakness. pt brought in new diagnosis of Diabetes and hypertriglyceridemia   Review of Systems   Respiratory:  Negative for cough, chest tightness and shortness of breath. Cardiovascular:  Negative for chest pain, palpitations and leg swelling. Neurological:  Negative for dizziness, weakness, light-headedness and headaches. Psychiatric/Behavioral:  Negative for dysphoric mood. The patient is not nervous/anxious. Current Outpatient Medications on File Prior to Visit   Medication Sig    aspirin (ECOTRIN LOW STRENGTH) 81 mg EC tablet Take 81 mg by mouth daily    atorvastatin (LIPITOR) 20 mg tablet TAKE 1 TABLET AT BEDTIME    bumetanide (BUMEX) 2 mg tablet TAKE 1 TABLET TWICE DAILY    calcium carbonate (TUMS) 500 mg chewable tablet Chew 2 tablets (1,000 mg total) daily as needed for indigestion or heartburn    metoprolol tartrate (LOPRESSOR) 25 mg tablet TAKE 1 TABLET EVERY 12 HOURS    Misc. Devices (Wheelchair) MISC Use daily    potassium chloride (K-DUR,KLOR-CON) 20 mEq tablet TAKE 1 TABLET TWICE DAILY    senna (SENOKOT) 8.6 mg Take 1 tablet (8.6 mg total) by mouth daily    [DISCONTINUED] bumetanide (BUMEX) 2 mg tablet Take 1 tablet (2 mg total) by mouth daily       Objective     /80 (BP Location: Left arm, Patient Position: Sitting, Cuff Size: Large)   Pulse 58   Temp 97.8 °F (36.6 °C) (Tympanic)   Resp 16   Ht 5' 7" (1.702 m)   Wt (!) 196 kg (433 lb)   SpO2 96%   BMI 67.82 kg/m²     Physical Exam  Constitutional:       General: He is not in acute distress. Appearance: Normal appearance. He is well-developed. He is obese. He is not ill-appearing. Eyes:      Extraocular Movements: Extraocular movements intact. Pupils: Pupils are equal, round, and reactive to light. Cardiovascular:      Rate and Rhythm: Normal rate and regular rhythm. Pulses: no weak pulses          Dorsalis pedis pulses are 0 (trace only) on the right side and 0 (trace only) on the left side. Heart sounds: Normal heart sounds. No murmur heard. Comments: Weak dorsalis pedis bilaterally   Pulmonary:      Effort: Pulmonary effort is normal.      Breath sounds: Normal breath sounds. Musculoskeletal:      Cervical back: Normal range of motion. Feet:      Right foot:      Skin integrity: Dry skin present. No ulcer, skin breakdown, erythema, warmth or callus. Left foot:      Skin integrity: Dry skin present. No ulcer, skin breakdown, erythema, warmth or callus. Neurological:      General: No focal deficit present. Mental Status: He is alert and oriented to person, place, and time. Mental status is at baseline. Psychiatric:         Mood and Affect: Mood normal.     Patient's shoes and socks removed. Right Foot/Ankle   Right Foot Inspection  Skin Exam: skin normal, skin intact and dry skin. No warmth, no callus, no erythema, no maceration, no abnormal color, no pre-ulcer, no ulcer and no callus. Toe Exam: No swelling, no tenderness, erythema and  no right toe deformity    Sensory   Vibration: intact  Proprioception: intact  Monofilament testing: intact    Vascular  The right DP pulse is 0 (trace only). Right Toe  - Comprehensive Exam  Ecchymosis: none  Swelling: none   Tenderness: none       Left Foot/Ankle  Left Foot Inspection  Skin Exam: skin normal, skin intact and dry skin. No warmth, no erythema, no maceration, normal color, no pre-ulcer, no ulcer and no callus. Sensory   Vibration: intact  Proprioception: intact  Monofilament testing: intact    Vascular  The left DP pulse is 0 (trace only).      Left Toe  - Comprehensive Exam  Ecchymosis: none  Swelling: none   Tenderness: none       Assign Risk Category  No deformity present  No loss of protective sensation  No weak pulses  Risk: 0    BMI Counseling: Body mass index is 67.82 kg/m². The BMI is above normal. Nutrition recommendations include 3-5 servings of fruits/vegetables daily, reducing fast food intake, consuming healthier snacks, decreasing soda and/or juice intake, moderation in carbohydrate intake, increasing intake of lean protein, and reducing intake of saturated fat and trans fat. Exercise recommendations include exercising 3-5 times per week.   Irineo Nj MD

## 2023-11-06 NOTE — PATIENT INSTRUCTIONS
Medicare Preventive Visit Patient Instructions  Thank you for completing your Welcome to Medicare Visit or Medicare Annual Wellness Visit today. Your next wellness visit will be due in one year (11/6/2024). The screening/preventive services that you may require over the next 5-10 years are detailed below. Some tests may not apply to you based off risk factors and/or age. Screening tests ordered at today's visit but not completed yet may show as past due. Also, please note that scanned in results may not display below. Preventive Screenings:  Service Recommendations Previous Testing/Comments   Colorectal Cancer Screening  Colonoscopy    Fecal Occult Blood Test (FOBT)/Fecal Immunochemical Test (FIT)  Fecal DNA/Cologuard Test  Flexible Sigmoidoscopy Age: 43-73 years old   Colonoscopy: every 10 years (May be performed more frequently if at higher risk)  OR  FOBT/FIT: every 1 year  OR  Cologuard: every 3 years  OR  Sigmoidoscopy: every 5 years  Screening may be recommended earlier than age 39 if at higher risk for colorectal cancer. Also, an individualized decision between you and your healthcare provider will decide whether screening between the ages of 77-80 would be appropriate.  Colonoscopy: Not on file  FOBT/FIT: Not on file  Cologuard: Not on file  Sigmoidoscopy: Not on file    Risks and Benefits Discussed  Due for Cologuard     Prostate Cancer Screening Individualized decision between patient and health care provider in men between ages of 53-66   Medicare will cover every 12 months beginning on the day after your 50th birthday PSA: 0.53 ng/mL     Screening Current     Hepatitis C Screening Once for adults born between 1945 and 1965  More frequently in patients at high risk for Hepatitis C Hep C Antibody: 06/08/2021    Screening Current   Diabetes Screening 1-2 times per year if you're at risk for diabetes or have pre-diabetes Fasting glucose: 236 mg/dL (10/18/2023)  A1C: 5.4 % of total Hgb (5/9/2019)  Risks and Benefits Discussed  Due for Blood Glucose   Cholesterol Screening Once every 5 years if you don't have a lipid disorder. May order more often based on risk factors. Lipid panel: 10/18/2023  Screening Not Indicated  History Lipid Disorder  Screening Current      Other Preventive Screenings Covered by Medicare:  Abdominal Aortic Aneurysm (AAA) Screening: covered once if your at risk. You're considered to be at risk if you have a family history of AAA or a male between the age of 70-76 who smoking at least 100 cigarettes in your lifetime. Lung Cancer Screening: covers low dose CT scan once per year if you meet all of the following conditions: (1) Age 48-67; (2) No signs or symptoms of lung cancer; (3) Current smoker or have quit smoking within the last 15 years; (4) You have a tobacco smoking history of at least 20 pack years (packs per day x number of years you smoked); (5) You get a written order from a healthcare provider. Glaucoma Screening: covered annually if you're considered high risk: (1) You have diabetes OR (2) Family history of glaucoma OR (3)  aged 48 and older OR (3)  American aged 72 and older  Osteoporosis Screening: covered every 2 years if you meet one of the following conditions: (1) Have a vertebral abnormality; (2) On glucocorticoid therapy for more than 3 months; (3) Have primary hyperparathyroidism; (4) On osteoporosis medications and need to assess response to drug therapy. HIV Screening: covered annually if you're between the age of 14-79. Also covered annually if you are younger than 13 and older than 72 with risk factors for HIV infection. For pregnant patients, it is covered up to 3 times per pregnancy.     Immunizations:  Immunization Recommendations   Influenza Vaccine Annual influenza vaccination during flu season is recommended for all persons aged >= 6 months who do not have contraindications   Pneumococcal Vaccine   * Pneumococcal conjugate vaccine = PCV13 (Prevnar 13), PCV15 (Vaxneuvance), PCV20 (Prevnar 20)  * Pneumococcal polysaccharide vaccine = PPSV23 (Pneumovax) Adults 03-60 yo with certain risk factors or if 69+ yo  If never received any pneumonia vaccine: recommend Prevnar 20 (PCV20)  Give PCV20 if previously received 1 dose of PCV13 or PPSV23   Hepatitis B Vaccine 3 dose series if at intermediate or high risk (ex: diabetes, end stage renal disease, liver disease)   Respiratory syncytial virus (RSV) Vaccine - COVERED BY MEDICARE PART D  * RSVPreF3 (Arexvy) CDC recommends that adults 61years of age and older may receive a single dose of RSV vaccine using shared clinical decision-making (SCDM)   Tetanus (Td) Vaccine - COST NOT COVERED BY MEDICARE PART B Following completion of primary series, a booster dose should be given every 10 years to maintain immunity against tetanus. Td may also be given as tetanus wound prophylaxis. Tdap Vaccine - COST NOT COVERED BY MEDICARE PART B Recommended at least once for all adults. For pregnant patients, recommended with each pregnancy. Shingles Vaccine (Shingrix) - COST NOT COVERED BY MEDICARE PART B  2 shot series recommended in those 23 years and older who have or will have weakened immune systems or those 50 years and older     Health Maintenance Due:      Topic Date Due   • HIV Screening  Never done   • Colorectal Cancer Screening  Never done   • Hepatitis C Screening  Completed     Immunizations Due:      Topic Date Due   • COVID-19 Vaccine (4 - Pfizer series) 01/08/2022   • Pneumococcal Vaccine: Pediatrics (0 to 5 Years) and At-Risk Patients (6 to 59 Years) (2 - PCV) 10/28/2022   • Influenza Vaccine (1) 09/01/2023     Advance Directives   What are advance directives? Advance directives are legal documents that state your wishes and plans for medical care. These plans are made ahead of time in case you lose your ability to make decisions for yourself.  Advance directives can apply to any medical decision, such as the treatments you want, and if you want to donate organs. What are the types of advance directives? There are many types of advance directives, and each state has rules about how to use them. You may choose a combination of any of the following:  Living will: This is a written record of the treatment you want. You can also choose which treatments you do not want, which to limit, and which to stop at a certain time. This includes surgery, medicine, IV fluid, and tube feedings. Durable power of  for healthcare [facility]): This is a written record that states who you want to make healthcare choices for you when you are unable to make them for yourself. This person, called a proxy, is usually a family member or a friend. You may choose more than 1 proxy. Do not resuscitate (DNR) order:  A DNR order is used in case your heart stops beating or you stop breathing. It is a request not to have certain forms of treatment, such as CPR. A DNR order may be included in other types of advance directives. Medical directive: This covers the care that you want if you are in a coma, near death, or unable to make decisions for yourself. You can list the treatments you want for each condition. Treatment may include pain medicine, surgery, blood transfusions, dialysis, IV or tube feedings, and a ventilator (breathing machine). Values history: This document has questions about your views, beliefs, and how you feel and think about life. This information can help others choose the care that you would choose. Why are advance directives important? An advance directive helps you control your care. Although spoken wishes may be used, it is better to have your wishes written down. Spoken wishes can be misunderstood, or not followed. Treatments may be given even if you do not want them. An advance directive may make it easier for your family to make difficult choices about your care.    Weight Management   Why it is important to manage your weight:  Being overweight increases your risk of health conditions such as heart disease, high blood pressure, type 2 diabetes, and certain types of cancer. It can also increase your risk for osteoarthritis, sleep apnea, and other respiratory problems. Aim for a slow, steady weight loss. Even a small amount of weight loss can lower your risk of health problems. How to lose weight safely:  A safe and healthy way to lose weight is to eat fewer calories and get regular exercise. You can lose up about 1 pound a week by decreasing the number of calories you eat by 500 calories each day. Healthy meal plan for weight management:  A healthy meal plan includes a variety of foods, contains fewer calories, and helps you stay healthy. A healthy meal plan includes the following:  Eat whole-grain foods more often. A healthy meal plan should contain fiber. Fiber is the part of grains, fruits, and vegetables that is not broken down by your body. Whole-grain foods are healthy and provide extra fiber in your diet. Some examples of whole-grain foods are whole-wheat breads and pastas, oatmeal, brown rice, and bulgur. Eat a variety of vegetables every day. Include dark, leafy greens such as spinach, kale, herminio greens, and mustard greens. Eat yellow and orange vegetables such as carrots, sweet potatoes, and winter squash. Eat a variety of fruits every day. Choose fresh or canned fruit (canned in its own juice or light syrup) instead of juice. Fruit juice has very little or no fiber. Eat low-fat dairy foods. Drink fat-free (skim) milk or 1% milk. Eat fat-free yogurt and low-fat cottage cheese. Try low-fat cheeses such as mozzarella and other reduced-fat cheeses. Choose meat and other protein foods that are low in fat. Choose beans or other legumes such as split peas or lentils. Choose fish, skinless poultry (chicken or turkey), or lean cuts of red meat (beef or pork).  Before you cook meat or poultry, cut off any visible fat. Use less fat and oil. Try baking foods instead of frying them. Add less fat, such as margarine, sour cream, regular salad dressing and mayonnaise to foods. Eat fewer high-fat foods. Some examples of high-fat foods include french fries, doughnuts, ice cream, and cakes. Eat fewer sweets. Limit foods and drinks that are high in sugar. This includes candy, cookies, regular soda, and sweetened drinks. Exercise:  Exercise at least 30 minutes per day on most days of the week. Some examples of exercise include walking, biking, dancing, and swimming. You can also fit in more physical activity by taking the stairs instead of the elevator or parking farther away from stores. Ask your healthcare provider about the best exercise plan for you. © Copyright [employer] 2018 Information is for End User's use only and may not be sold, redistributed or otherwise used for commercial purposes.  All illustrations and images included in CareNotes® are the copyrighted property of A.D.A.M., Inc. or  [employer]

## 2023-11-14 ENCOUNTER — HOSPITAL ENCOUNTER (OUTPATIENT)
Dept: NON INVASIVE DIAGNOSTICS | Facility: CLINIC | Age: 63
Discharge: HOME | End: 2023-11-14
Payer: COMMERCIAL

## 2023-11-14 DIAGNOSIS — R09.89 DECREASED DORSALIS PEDIS PULSE: ICD-10-CM

## 2023-11-14 PROCEDURE — 93925 LOWER EXTREMITY STUDY: CPT

## 2023-11-14 PROCEDURE — 93925 LOWER EXTREMITY STUDY: CPT | Performed by: SURGERY

## 2023-11-14 PROCEDURE — 93923 UPR/LXTR ART STDY 3+ LVLS: CPT

## 2023-11-14 PROCEDURE — 93922 UPR/L XTREMITY ART 2 LEVELS: CPT | Performed by: SURGERY

## 2023-11-29 ENCOUNTER — VBI (OUTPATIENT)
Dept: ADMINISTRATIVE | Facility: OTHER | Age: 63
End: 2023-11-29

## 2023-12-12 ENCOUNTER — APPOINTMENT (OUTPATIENT)
Dept: LAB | Facility: HOSPITAL | Age: 63
End: 2023-12-12
Attending: FAMILY MEDICINE
Payer: COMMERCIAL

## 2023-12-12 DIAGNOSIS — E11.65 TYPE 2 DIABETES MELLITUS WITH HYPERGLYCEMIA, WITHOUT LONG-TERM CURRENT USE OF INSULIN (HCC): Primary | ICD-10-CM

## 2023-12-12 DIAGNOSIS — E11.65 TYPE 2 DIABETES MELLITUS WITH HYPERGLYCEMIA, WITHOUT LONG-TERM CURRENT USE OF INSULIN (HCC): ICD-10-CM

## 2023-12-12 LAB
ANION GAP SERPL CALCULATED.3IONS-SCNC: 12 MMOL/L
BUN SERPL-MCNC: 24 MG/DL (ref 5–25)
CALCIUM SERPL-MCNC: 9.4 MG/DL (ref 8.4–10.2)
CHLORIDE SERPL-SCNC: 100 MMOL/L (ref 96–108)
CO2 SERPL-SCNC: 25 MMOL/L (ref 21–32)
COLOGUARD RESULT REPORTABLE: NEGATIVE
CREAT SERPL-MCNC: 0.96 MG/DL (ref 0.6–1.3)
CREAT UR-MCNC: 279.5 MG/DL
EST. AVERAGE GLUCOSE BLD GHB EST-MCNC: 183 MG/DL
GFR SERPL CREATININE-BSD FRML MDRD: 83 ML/MIN/1.73SQ M
GLUCOSE P FAST SERPL-MCNC: 173 MG/DL (ref 65–99)
HBA1C MFR BLD: 8 %
MICROALBUMIN UR-MCNC: 30.9 MG/L
MICROALBUMIN/CREAT 24H UR: 11 MG/G CREATININE (ref 0–30)
POTASSIUM SERPL-SCNC: 4.1 MMOL/L (ref 3.5–5.3)
SODIUM SERPL-SCNC: 137 MMOL/L (ref 135–147)

## 2023-12-12 PROCEDURE — 36415 COLL VENOUS BLD VENIPUNCTURE: CPT

## 2023-12-12 PROCEDURE — 82570 ASSAY OF URINE CREATININE: CPT

## 2023-12-12 PROCEDURE — 83036 HEMOGLOBIN GLYCOSYLATED A1C: CPT

## 2023-12-12 PROCEDURE — 82043 UR ALBUMIN QUANTITATIVE: CPT

## 2023-12-12 PROCEDURE — 80048 BASIC METABOLIC PNL TOTAL CA: CPT

## 2023-12-13 RX ORDER — LOSARTAN POTASSIUM 25 MG/1
25 TABLET ORAL DAILY
Qty: 30 TABLET | Refills: 5 | Status: SHIPPED | OUTPATIENT
Start: 2023-12-13

## 2023-12-14 ENCOUNTER — OFFICE VISIT (OUTPATIENT)
Dept: FAMILY MEDICINE CLINIC | Facility: CLINIC | Age: 63
End: 2023-12-14
Payer: COMMERCIAL

## 2023-12-14 VITALS
HEIGHT: 67 IN | OXYGEN SATURATION: 94 % | RESPIRATION RATE: 16 BRPM | WEIGHT: 315 LBS | DIASTOLIC BLOOD PRESSURE: 82 MMHG | TEMPERATURE: 97.8 F | HEART RATE: 63 BPM | BODY MASS INDEX: 49.44 KG/M2 | SYSTOLIC BLOOD PRESSURE: 122 MMHG

## 2023-12-14 DIAGNOSIS — L97.919 VENOUS STASIS ULCERS OF BOTH LOWER EXTREMITIES (HCC): ICD-10-CM

## 2023-12-14 DIAGNOSIS — N18.2 STAGE 2 CHRONIC KIDNEY DISEASE: ICD-10-CM

## 2023-12-14 DIAGNOSIS — I83.019 VENOUS STASIS ULCERS OF BOTH LOWER EXTREMITIES (HCC): ICD-10-CM

## 2023-12-14 DIAGNOSIS — I83.029 VENOUS STASIS ULCERS OF BOTH LOWER EXTREMITIES (HCC): ICD-10-CM

## 2023-12-14 DIAGNOSIS — I10 ESSENTIAL HYPERTENSION: ICD-10-CM

## 2023-12-14 DIAGNOSIS — E78.5 DYSLIPIDEMIA: Primary | ICD-10-CM

## 2023-12-14 DIAGNOSIS — E11.65 TYPE 2 DIABETES MELLITUS WITH HYPERGLYCEMIA, WITHOUT LONG-TERM CURRENT USE OF INSULIN (HCC): ICD-10-CM

## 2023-12-14 DIAGNOSIS — Z86.79 HISTORY OF ATRIAL FIBRILLATION: ICD-10-CM

## 2023-12-14 DIAGNOSIS — L97.929 VENOUS STASIS ULCERS OF BOTH LOWER EXTREMITIES (HCC): ICD-10-CM

## 2023-12-14 DIAGNOSIS — R80.9 MICROALBUMINURIA: ICD-10-CM

## 2023-12-14 PROBLEM — I87.2 VENOUS INSUFFICIENCY OF BOTH LOWER EXTREMITIES: Status: ACTIVE | Noted: 2019-05-07

## 2023-12-14 PROBLEM — N17.9 AKI (ACUTE KIDNEY INJURY) (HCC): Status: RESOLVED | Noted: 2021-06-14 | Resolved: 2023-12-14

## 2023-12-14 PROBLEM — N28.9 ABNORMAL RENAL FUNCTION: Status: RESOLVED | Noted: 2021-06-28 | Resolved: 2023-12-14

## 2023-12-14 PROCEDURE — 99214 OFFICE O/P EST MOD 30 MIN: CPT | Performed by: FAMILY MEDICINE

## 2023-12-14 NOTE — ASSESSMENT & PLAN NOTE
Lab Results   Component Value Date    EGFR 83 12/12/2023    EGFR 93 10/18/2023    EGFR 78 09/19/2021    CREATININE 0.96 12/12/2023    CREATININE 0.84 10/18/2023    CREATININE 1.04 09/19/2021   Has been stable

## 2023-12-14 NOTE — ASSESSMENT & PLAN NOTE
Lab Results   Component Value Date    HGBA1C 8.0 (H) 12/12/2023   Now with proteinuria add losartan and jardiance

## 2023-12-14 NOTE — PROGRESS NOTES
Name: Kassi Love      : 1960      MRN: [id number]  Encounter Provider: Elian Mckay MD  Encounter Date: 2023   Encounter department: [facility] [address] MEDICINE    Assessment & Plan     1. Dyslipidemia  Assessment & Plan:  Triglycerides were 426  repeat strict 12 hour fast       2. Stage 2 chronic kidney disease  Assessment & Plan:  Lab Results   Component Value Date    EGFR 83 2023    EGFR 93 10/18/2023    EGFR 78 2021    CREATININE 0.96 2023    CREATININE 0.84 10/18/2023    CREATININE 1.04 2021   Has been stable       3. Essential hypertension  Assessment & Plan:  Well controlled on current therapy continue with current medications and will reassess next visit        4. Type 2 diabetes mellitus with hyperglycemia, without long-term current use of insulin (HCC)  Assessment & Plan:    Lab Results   Component Value Date    HGBA1C 8.0 (H) 2023   Now with proteinuria add losartan and jardiance     Orders:  -     Hemoglobin A1C; Future; Expected date: 2024  -     Basic metabolic panel; Future; Expected date: 2024    5. Microalbuminuria  Assessment & Plan:  Start losartan and jardiance      6. Venous stasis ulcers of both lower extremities ([address])    7. History of atrial fibrillation  Assessment & Plan:  Reviewed cardiology appt           Depression Screening and Follow-up Plan: Patient was screened for depression during today's encounter. They screened negative with a PHQ-2 score of 0. Subjective      HPI Pt is here for interval visit and evaluation of multiple medical problems, review of medications, labs, Health Maintenance and any recent specialty consults cardiology     Review of Systems   Constitutional:  Negative for appetite change, chills, fatigue and fever. Respiratory:  Negative for cough, chest tightness and shortness of breath. Cardiovascular:  Negative for chest pain, palpitations and leg swelling.    Gastrointestinal: Negative for abdominal pain, constipation, diarrhea, nausea and vomiting. Genitourinary:  Negative for difficulty urinating and frequency. Musculoskeletal:  Negative for arthralgias, back pain, gait problem and neck pain. Skin:  Negative for rash. Neurological:  Negative for dizziness, weakness, light-headedness, numbness and headaches. Hematological:  Does not bruise/bleed easily. Psychiatric/Behavioral:  Negative for dysphoric mood and sleep disturbance. The patient is not nervous/anxious. Current Outpatient Medications on File Prior to Visit   Medication Sig   • aspirin (ECOTRIN LOW STRENGTH) 81 mg EC tablet Take 81 mg by mouth daily   • atorvastatin (LIPITOR) 20 mg tablet TAKE 1 TABLET AT BEDTIME   • bumetanide (BUMEX) 2 mg tablet TAKE 1 TABLET TWICE DAILY   • calcium carbonate (TUMS) 500 mg chewable tablet Chew 2 tablets (1,000 mg total) daily as needed for indigestion or heartburn   • Empagliflozin (JARDIANCE) 10 MG TABS tablet Take 1 tablet (10 mg total) by mouth daily   • losartan (COZAAR) 25 mg tablet Take 1 tablet (25 mg total) by mouth daily   • metFORMIN (GLUCOPHAGE) 500 mg tablet Take 1 tablet (500 mg total) by mouth 2 (two) times a day with meals   • metoprolol tartrate (LOPRESSOR) 25 mg tablet TAKE 1 TABLET EVERY 12 HOURS   • Misc. Devices (Wheelchair) MISC Use daily   • potassium chloride (K-DUR,KLOR-CON) 20 mEq tablet TAKE 1 TABLET TWICE DAILY   • senna (SENOKOT) 8.6 mg Take 1 tablet (8.6 mg total) by mouth daily       Objective     /82 (BP Location: Left arm, Patient Position: Sitting, Cuff Size: Large)   Pulse 63   Temp 97.8 °F (36.6 °C) (Tympanic)   Resp 16   Ht 5' 7" (1.702 m)   Wt (!) 196 kg (433 lb)   SpO2 94%   BMI 67.82 kg/m²     Physical Exam  Vitals reviewed. Constitutional:       General: He is not in acute distress. Appearance: Normal appearance. He is well-developed. He is obese. He is not ill-appearing.    HENT:      Mouth/Throat:      Mouth: Mucous membranes are moist.   Eyes:      Extraocular Movements: Extraocular movements intact. Conjunctiva/sclera: Conjunctivae normal.      Pupils: Pupils are equal, round, and reactive to light. Neck:      Thyroid: No thyromegaly. Vascular: No carotid bruit. Cardiovascular:      Rate and Rhythm: Normal rate and regular rhythm. Pulses: Normal pulses. Heart sounds: Normal heart sounds. No murmur heard. Pulmonary:      Effort: Pulmonary effort is normal.      Breath sounds: Normal breath sounds. Chest:      Chest wall: No tenderness. Abdominal:      General: Bowel sounds are normal. There is no distension. Palpations: Abdomen is soft. Tenderness: There is no abdominal tenderness. Musculoskeletal:      Cervical back: Normal range of motion and neck supple. Lymphadenopathy:      Cervical: No cervical adenopathy. Skin:     General: Skin is warm and dry. Neurological:      General: No focal deficit present. Mental Status: He is alert and oriented to person, place, and time. Mental status is at baseline. Cranial Nerves: No cranial nerve deficit.    Psychiatric:         Mood and Affect: Mood normal.         Behavior: Behavior normal.       Nando Stratton MD

## 2023-12-15 DIAGNOSIS — E11.65 TYPE 2 DIABETES MELLITUS WITH HYPERGLYCEMIA, WITHOUT LONG-TERM CURRENT USE OF INSULIN (HCC): ICD-10-CM

## 2023-12-15 RX ORDER — LOSARTAN POTASSIUM 25 MG/1
25 TABLET ORAL DAILY
Qty: 90 TABLET | Refills: 1 | OUTPATIENT
Start: 2023-12-15

## 2024-01-04 ENCOUNTER — PATIENT MESSAGE (OUTPATIENT)
Dept: FAMILY MEDICINE CLINIC | Facility: CLINIC | Age: 64
End: 2024-01-04

## 2024-01-04 NOTE — PATIENT COMMUNICATION
Patient is requesting to see if there is a less expensive alternative to the medication you prescribed of Empagliflozin (JARDIANCE) 10 MG TABS tablet. Please follow up with the patient.

## 2024-01-05 PROBLEM — Z00.00 MEDICARE ANNUAL WELLNESS VISIT, SUBSEQUENT: Status: RESOLVED | Noted: 2023-11-06 | Resolved: 2024-01-05

## 2024-01-06 DIAGNOSIS — E11.65 TYPE 2 DIABETES MELLITUS WITH HYPERGLYCEMIA, WITHOUT LONG-TERM CURRENT USE OF INSULIN (HCC): ICD-10-CM

## 2024-01-08 RX ORDER — LOSARTAN POTASSIUM 25 MG/1
25 TABLET ORAL DAILY
Qty: 90 TABLET | Refills: 1 | Status: SHIPPED | OUTPATIENT
Start: 2024-01-08

## 2024-01-08 NOTE — RESPIRATORY THERAPY NOTE
RT Protocol Note  Marline Razo [address] [facility]  male MRN: [id number]  Unit/Bed#: S -01 Encounter: [id number]    Assessment    Principal Problem:    Other heart failure ([facility] [place] [address] )  Active Problems:    Essential hypertension    Mixed hyperlipidemia    Morbid obesity (HCC)    CASSIE (obstructive sleep apnea)    Venous stasis ulcers of both lower extremities (HCC)    Paroxysmal atrial fibrillation (HCC)      Home Pulmonary Medications:  none       Past Medical History:   Diagnosis Date    Arthritis     [facility]     Essential hypertension     Hyperlipidemia     Hypertension     Lower extremity edema     Obesity     CASSIE (obstructive sleep apnea)     Osteoarthritis of both knees     PSVT (paroxysmal supraventricular tachycardia) (HCC)     Vitamin D deficiency      Social History     Socioeconomic History    Marital status: /Civil Union     Spouse name: None    Number of children: None    Years of education: None    Highest education level: None   Occupational History    None   Social Needs    Financial resource strain: None    Food insecurity     Worry: None     Inability: None    Transportation needs     Medical: None     Non-medical: None   Tobacco Use    Smoking status: Former Smoker     Packs/day: 1 00     Years: 15 00     Pack years: 15 00     Types: Cigarettes     Start date: 2006     Quit date: 2021     Years since quittin 4    Smokeless tobacco: Never Used   Substance and Sexual Activity    Alcohol use: Not Currently     Alcohol/week: 0 0 standard drinks     Frequency: Monthly or less     Drinks per session: 1 or 2     Binge frequency: Never     Comment: occ per [employer] Drug use: Never    Sexual activity: None   Lifestyle    Physical activity     Days per week: None     Minutes per session: None    Stress: None   Relationships    Social connections     Talks on phone: None     Gets together: None     Attends Mu-ism service: None     Active member of club Received request via: Pharmacy    Was the patient seen in the last year in this department? Yes    Does the patient have an active prescription (recently filled or refills available) for medication(s) requested? No    Does the patient have senior living Plus and need 100 day supply (blood pressure, diabetes and cholesterol meds only)? Patient does not have SCP   or organization: None     Attends meetings of clubs or organizations: None     Relationship status: None    Intimate partner violence     Fear of current or ex partner: None     Emotionally abused: None     Physically abused: None     Forced sexual activity: None   Other Topics Concern    None   Social History Narrative    None       Subjective         Objective    Physical Exam:   Assessment Type: Assess only  General Appearance: Awake, Alert  Respiratory Pattern: Normal  Chest Assessment: Chest expansion symmetrical  Bilateral Breath Sounds: Diminished  Cough: Non-productive    Vitals:  Blood pressure 133/63, pulse 74, temperature 99 1 °F (37 3 °C), temperature source Oral, resp  rate 18, height 5' 6 5" (1 689 m), weight (!) 201 kg (443 lb 12 8 oz), SpO2 92 %  Imaging and other studies: I have personally reviewed pertinent reports  06/08/21 0138   Respiratory Protocol   Protocol Initiated? Yes   Protocol Selection Respiratory   Language Barrier? No   Medical & Social History Reviewed? Yes   Diagnostic Studies Reviewed? Yes   Physical Assessment Performed? Yes   Respiratory Plan No distress/Pulmonary history   Respiratory Assessment   Assessment Type Assess only   General Appearance Awake; Alert   Respiratory Pattern Normal   Chest Assessment Chest expansion symmetrical   Bilateral Breath Sounds Diminished   Cough Non-productive   Resp Comments pt assessed per protocol, pt states he had a CPAP at one point and hasn't wore it in years, no home O2, nebulizer or MDI use, pt states he does not have any pulmonary history, pt has a smoking history quit 1/2021   Additional Assessments   Pulse 74   Respirations 18   SpO2 92 %             Plan    Respiratory Plan: No distress/Pulmonary history        Resp Comments: pt assessed per protocol, pt states he had a CPAP at one point and hasn't wore it in years, no home O2, nebulizer or MDI use, pt states he does not have any pulmonary history, pt has a smoking history quit 1/2021

## 2024-01-10 DIAGNOSIS — E11.65 TYPE 2 DIABETES MELLITUS WITH HYPERGLYCEMIA, WITHOUT LONG-TERM CURRENT USE OF INSULIN (HCC): Primary | ICD-10-CM

## 2024-01-18 DIAGNOSIS — I50.31 ACUTE DIASTOLIC HEART FAILURE (HCC): ICD-10-CM

## 2024-01-18 DIAGNOSIS — Z76.0 ENCOUNTER FOR ISSUE OF REPEAT PRESCRIPTION: ICD-10-CM

## 2024-01-18 RX ORDER — ATORVASTATIN CALCIUM 20 MG/1
TABLET, FILM COATED ORAL
Qty: 90 TABLET | Refills: 1 | Status: SHIPPED | OUTPATIENT
Start: 2024-01-18

## 2024-01-25 ENCOUNTER — VBI (OUTPATIENT)
Dept: ADMINISTRATIVE | Facility: OTHER | Age: 64
End: 2024-01-25

## 2024-01-25 NOTE — TELEPHONE ENCOUNTER
01/25/24 10:57 AM     [facility] [employer] SmartForm used to document caregap status.    Ijeoma Pedersen MA

## 2024-02-07 DIAGNOSIS — I50.31 ACUTE DIASTOLIC HEART FAILURE (HCC): ICD-10-CM

## 2024-02-07 RX ORDER — POTASSIUM CHLORIDE 20 MEQ/1
TABLET, EXTENDED RELEASE ORAL
Qty: 180 TABLET | Refills: 1 | Status: SHIPPED | OUTPATIENT
Start: 2024-02-07

## 2024-03-24 DIAGNOSIS — E11.65 TYPE 2 DIABETES MELLITUS WITH HYPERGLYCEMIA, WITHOUT LONG-TERM CURRENT USE OF INSULIN (HCC): ICD-10-CM

## 2024-03-27 DIAGNOSIS — I50.31 ACUTE DIASTOLIC HEART FAILURE (HCC): ICD-10-CM

## 2024-03-27 RX ORDER — BUMETANIDE 2 MG/1
2 TABLET ORAL 2 TIMES DAILY
Qty: 180 TABLET | Refills: 1 | Status: SHIPPED | OUTPATIENT
Start: 2024-03-27

## 2024-04-18 NOTE — PROGRESS NOTES
[facility]  Progress Note - Jennifer Callahan 1960, 61 y o  male MRN: [id number]  Unit/Bed#: S -01 Encounter: [id number]  Primary Care Provider: Caitlin Hamilton MD   Date and time admitted to hospital: 6/7/2021  7:27 PM    * Acute diastolic heart failure [facility])  Assessment & Plan  Patient presented to outpatient cardiologist 6/7 for routine follow-up with evidence volume overload and suspected heart failure  Referred to the ER by cardiologist   Symptomatically patient reports worsening lower extremity edema and dyspnea exertion x1 month  Minimal improvement with Lasix 20 mg daily  Appreciate input from Cardiology  Status post Lasix  Patient was switched to IV Bumex, 6/11  As per cardiologist, continue with present doses  Continue with lisinopril 5 mg once a day  LVEF on echocardiogram 60%  Cardiology on board  2 g sodium diet, fluid restriction, daily weights, I/O    Paroxysmal atrial fibrillation [facility])  Assessment & Plan  Patient had EKG that showed slow atrial fibrillation however per Cardiology appears to be sinus rhythm  History of atrial flutter with prior flutter ablation  On telemetry, appears normal sinus - this was d/c   Not on anticoagulation, continue aspirin  Atenolol changed to Coreg    Venous stasis ulcers of both lower extremities (HCC)  Assessment & Plan  · On Real stockings  CASSIE (obstructive sleep apnea)  Assessment & Plan  Noncompliant with CPAP therapy, states he was unable to tolerate it    Morbid obesity (HCC)  Assessment & Plan  Recommend diet, weight loss, lifestyle modifications  BMI greater than 70  follows outpatient bariatric    Mixed hyperlipidemia  Assessment & Plan  Continue Lipitor  LDL 60    Essential hypertension  Assessment & Plan  Continue lisinopril, atenolol changed to Coreg  Monitor with diuresis  Lisinopril dose now at 5 mg once a day, due to previous low blood pressures  Leidy Medina       VTE Pharmacologic Prophylaxis:   Pharmacologic: Enoxaparin (Lovenox)  Mechanical VTE Prophylaxis in Place: Yes    Patient Centered Rounds: I have performed bedside rounds with nursing staff today  Discussions with Specialists or Other Care Team Provider:  Case management  Education and Discussions with Family / Patient:  I gave updates to the patient  I offered to call patient's family, but patient declined  Time Spent for Care: 30 minutes  More than 50% of total time spent on counseling and coordination of care as described above  Current Length of Stay: 5 day(s)    Current Patient Status: Inpatient   Certification Statement: The patient will continue to require additional inpatient hospital stay due to Above findings and plans  Discharge Plan:  None yet today  Code Status: Level 1 - Full Code      Subjective:   Patient feels fine and better today  Patient denies any shortness of breath or any pains  No complaints  Objective:     Vitals:   Temp (24hrs), Av 8 °F (36 6 °C), Min:97 5 °F (36 4 °C), Max:98 °F (36 7 °C)    Temp:  [97 5 °F (36 4 °C)-98 °F (36 7 °C)] 97 5 °F (36 4 °C)  HR:  [78-90] 78  Resp:  [18] 18  BP: (137-148)/(67) 137/67  SpO2:  [92 %-94 %] 94 %  Body mass index is 68 36 kg/m²  Input and Output Summary (last 24 hours): Intake/Output Summary (Last 24 hours) at 2021 1505  Last data filed at 2021 1248  Gross per 24 hour   Intake 240 ml   Output 1425 ml   Net -1185 ml       Physical Exam:     Physical Exam  Vitals signs and nursing note reviewed  Constitutional:       General: He is not in acute distress  Appearance: He is obese  He is not ill-appearing, toxic-appearing or diaphoretic  Cardiovascular:      Rate and Rhythm: Normal rate and regular rhythm  Heart sounds: Normal heart sounds  Pulmonary:      Effort: Pulmonary effort is normal  No respiratory distress  Breath sounds: Normal breath sounds  Abdominal:      General: Bowel sounds are normal  There is no distension        Palpations: Abdomen is soft  Tenderness: There is no abdominal tenderness  Musculoskeletal:      Right lower leg: Edema present  Left lower leg: Edema present  Skin:     General: Skin is warm  Coloration: Skin is not pale  Findings: No erythema or rash  Neurological:      General: No focal deficit present  Mental Status: He is alert  Psychiatric:         Mood and Affect: Mood normal          Behavior: Behavior normal          Thought Content: Thought content normal               Additional Data:     Labs:    Results from last 7 days   Lab Units 06/09/21  0447  06/07/21 2004   WBC Thousand/uL 9 93   < > 10 83*   HEMOGLOBIN g/dL 12 6   < > 12 8   HEMATOCRIT % 40 5   < > 40 2   PLATELETS Thousands/uL 211   < > 211   NEUTROS PCT %  --   --  67   LYMPHS PCT %  --   --  20   MONOS PCT %  --   --  6   EOS PCT %  --   --  5    < > = values in this interval not displayed  Results from last 7 days   Lab Units 06/12/21  0233  06/07/21 2004   POTASSIUM mmol/L 4 2   < > 4 1   CHLORIDE mmol/L 98*   < > 101   CO2 mmol/L 32   < > 32   BUN mg/dL 27*   < > 20   CREATININE mg/dL 1 15   < > 0 97   CALCIUM mg/dL 8 7   < > 9 0   ALK PHOS U/L  --   --  88   ALT U/L  --   --  17   AST U/L  --   --  13    < > = values in this interval not displayed  * I Have Reviewed All Lab Data Listed Above  * Additional Pertinent Lab Tests Reviewed: [facility] Reviewed    Imaging:    Imaging Reports Reviewed Today Include:  Diagnostic imaging studies that were done on this admission  Imaging Personally Reviewed by Myself Includes:  None      Recent Cultures (last 7 days):           Last 24 Hours Medication List:   Current Facility-Administered Medications   Medication Dose Route Frequency Provider Last Rate    acetaminophen  650 mg Oral Q6H PRN Zeeshan Pederson PA-C      aspirin  81 mg Oral Daily Zeeshan Pederson PA-C      atorvastatin  20 mg Oral HS Zeeshan Pederson Ambulatory CONNIE      bumetanide  2 mg Intravenous TID ARYAN Damian      calcium carbonate  1,000 mg Oral Daily PRN Anmol Pederson PA-C      carvedilol  6 25 mg Oral BID With Meals Melvenia Mohamud, CRBASILIA      enoxaparin  40 mg Subcutaneous Daily Zeeshan Pederson PA-C      lisinopril  5 mg Oral Daily ARYAN Damian      ondansetron  4 mg Intravenous Q6H PRN Zeeshan Pederson PA-C      potassium chloride  20 mEq Oral TID With Meals BingvenARYAN Manley      senna  1 tablet Oral Daily Zeeshan Pederson PA-C          Today, Patient Was Seen By: Jay Butler MD    ** Please Note: Dragon 360 Dictation voice to text software may have been used in the creation of this document   ** Wheelchair/Stroller

## 2024-04-24 ENCOUNTER — APPOINTMENT (OUTPATIENT)
Dept: LAB | Facility: HOSPITAL | Age: 64
End: 2024-04-24
Payer: COMMERCIAL

## 2024-04-24 DIAGNOSIS — E11.65 TYPE 2 DIABETES MELLITUS WITH HYPERGLYCEMIA, WITHOUT LONG-TERM CURRENT USE OF INSULIN (HCC): ICD-10-CM

## 2024-04-24 LAB
ANION GAP SERPL CALCULATED.3IONS-SCNC: 11 MMOL/L (ref 4–13)
BUN SERPL-MCNC: 25 MG/DL (ref 5–25)
CALCIUM SERPL-MCNC: 9.4 MG/DL (ref 8.4–10.2)
CHLORIDE SERPL-SCNC: 101 MMOL/L (ref 96–108)
CO2 SERPL-SCNC: 29 MMOL/L (ref 21–32)
CREAT SERPL-MCNC: 0.98 MG/DL (ref 0.6–1.3)
EST. AVERAGE GLUCOSE BLD GHB EST-MCNC: 140 MG/DL
GFR SERPL CREATININE-BSD FRML MDRD: 81 ML/MIN/1.73SQ M
GLUCOSE P FAST SERPL-MCNC: 149 MG/DL (ref 65–99)
HBA1C MFR BLD: 6.5 %
POTASSIUM SERPL-SCNC: 4.5 MMOL/L (ref 3.5–5.3)
SODIUM SERPL-SCNC: 141 MMOL/L (ref 135–147)

## 2024-04-24 PROCEDURE — 80048 BASIC METABOLIC PNL TOTAL CA: CPT

## 2024-04-24 PROCEDURE — 83036 HEMOGLOBIN GLYCOSYLATED A1C: CPT

## 2024-04-24 PROCEDURE — 3044F HG A1C LEVEL LT 7.0%: CPT | Performed by: FAMILY MEDICINE

## 2024-04-24 PROCEDURE — 36415 COLL VENOUS BLD VENIPUNCTURE: CPT

## 2024-05-01 ENCOUNTER — OFFICE VISIT (OUTPATIENT)
Dept: FAMILY MEDICINE CLINIC | Facility: CLINIC | Age: 64
End: 2024-05-01
Payer: COMMERCIAL

## 2024-05-01 VITALS
BODY MASS INDEX: 49.44 KG/M2 | OXYGEN SATURATION: 94 % | WEIGHT: 315 LBS | DIASTOLIC BLOOD PRESSURE: 84 MMHG | RESPIRATION RATE: 16 BRPM | TEMPERATURE: 98 F | SYSTOLIC BLOOD PRESSURE: 138 MMHG | HEART RATE: 85 BPM | HEIGHT: 67 IN

## 2024-05-01 DIAGNOSIS — Z23 ENCOUNTER FOR IMMUNIZATION: ICD-10-CM

## 2024-05-01 DIAGNOSIS — Z12.5 PROSTATE CANCER SCREENING: ICD-10-CM

## 2024-05-01 DIAGNOSIS — G89.29 CHRONIC PAIN OF BOTH KNEES: ICD-10-CM

## 2024-05-01 DIAGNOSIS — I87.2 VENOUS INSUFFICIENCY OF BOTH LOWER EXTREMITIES: ICD-10-CM

## 2024-05-01 DIAGNOSIS — M25.562 CHRONIC PAIN OF BOTH KNEES: ICD-10-CM

## 2024-05-01 DIAGNOSIS — E11.9 TYPE 2 DIABETES MELLITUS WITHOUT COMPLICATION, WITHOUT LONG-TERM CURRENT USE OF INSULIN (HCC): ICD-10-CM

## 2024-05-01 DIAGNOSIS — E78.5 DYSLIPIDEMIA: Primary | ICD-10-CM

## 2024-05-01 DIAGNOSIS — G47.33 OSA (OBSTRUCTIVE SLEEP APNEA): ICD-10-CM

## 2024-05-01 DIAGNOSIS — R80.9 MICROALBUMINURIA: ICD-10-CM

## 2024-05-01 DIAGNOSIS — N18.2 STAGE 2 CHRONIC KIDNEY DISEASE: ICD-10-CM

## 2024-05-01 DIAGNOSIS — I10 ESSENTIAL HYPERTENSION: ICD-10-CM

## 2024-05-01 DIAGNOSIS — M17.0 PRIMARY OSTEOARTHRITIS OF BOTH KNEES: ICD-10-CM

## 2024-05-01 DIAGNOSIS — I50.32 CHRONIC DIASTOLIC (CONGESTIVE) HEART FAILURE (HCC): ICD-10-CM

## 2024-05-01 DIAGNOSIS — M25.561 CHRONIC PAIN OF BOTH KNEES: ICD-10-CM

## 2024-05-01 DIAGNOSIS — E66.01 MORBID OBESITY (HCC): ICD-10-CM

## 2024-05-01 DIAGNOSIS — Z23 NEED FOR COVID-19 VACCINE: ICD-10-CM

## 2024-05-01 PROBLEM — E11.65 TYPE 2 DIABETES MELLITUS WITH HYPERGLYCEMIA, WITHOUT LONG-TERM CURRENT USE OF INSULIN (HCC): Status: RESOLVED | Noted: 2023-11-06 | Resolved: 2024-05-01

## 2024-05-01 PROCEDURE — 99214 OFFICE O/P EST MOD 30 MIN: CPT

## 2024-05-01 PROCEDURE — 3075F SYST BP GE 130 - 139MM HG: CPT | Performed by: FAMILY MEDICINE

## 2024-05-01 PROCEDURE — 3066F NEPHROPATHY DOC TX: CPT | Performed by: FAMILY MEDICINE

## 2024-05-01 PROCEDURE — 90750 HZV VACC RECOMBINANT IM: CPT

## 2024-05-01 PROCEDURE — 3725F SCREEN DEPRESSION PERFORMED: CPT | Performed by: FAMILY MEDICINE

## 2024-05-01 PROCEDURE — 90471 IMMUNIZATION ADMIN: CPT

## 2024-05-01 PROCEDURE — 91320 SARSCV2 VAC 30MCG TRS-SUC IM: CPT

## 2024-05-01 PROCEDURE — 90480 ADMN SARSCOV2 VAC 1/ONLY CMP: CPT

## 2024-05-01 PROCEDURE — 3079F DIAST BP 80-89 MM HG: CPT | Performed by: FAMILY MEDICINE

## 2024-05-01 NOTE — PROGRESS NOTES
Name: Andrew Robert      : 1960      MRN: [id number]  Encounter Provider: Felisa Ramirez MD  Encounter Date: 2024   Encounter department: [facility]    Assessment & Plan     1. Dyslipidemia  Assessment & Plan:  Check  lipid       2. Essential hypertension  Assessment & Plan:  Well controlled on current therapy continue with current medications and will reassess next visit        3. CASSIE (obstructive sleep apnea)  Assessment & Plan:  Sleeps in recliner       4. Chronic pain of both knees  Comments:  with weight bearing only    5. Encounter for immunization  -     Zoster Vaccine Recombinant IM    6. Need for COVID-19 vaccine  -     COVID-19 Pfizer mRNA vaccine 12 yr and older (GRAY cap vial or pre-filled syringe)    7. Chronic diastolic (congestive) heart failure (HCC)  Assessment & Plan:  Wt Readings from Last 3 Encounters:   24 (!) 196 kg (433 lb)   23 (!) 196 kg (433 lb)   23 (!) 196 kg (433 lb)     Compensated cardiology note reviewed             8. Microalbuminuria  Assessment & Plan:  On losartan      9. Body mass index (BMI) 60.0-69.9, adult (HCC)    10. Morbid obesity (HCC)  Assessment & Plan:  Discussion on diet and exercise guidelines for weight loss and  health reviewed with pt         11. Stage 2 chronic kidney disease  Assessment & Plan:  Lab Results   Component Value Date    EGFR 81 2024    EGFR 83 2023    EGFR 93 10/18/2023    CREATININE 0.98 2024    CREATININE 0.96 2023    CREATININE 0.84 10/18/2023   Stable       12. Primary osteoarthritis of both knees  Assessment & Plan:  Discomfort with prolonged weight bearing       13. Venous insufficiency of both lower extremities  Assessment & Plan:  Ok today       14. Type 2 diabetes mellitus without complication, without long-term current use of insulin (HCC)  Assessment & Plan:    Lab Results   Component Value Date    HGBA1C 6.5 (H) 2024   Well controlled     Orders:  -      Hemoglobin A1C; Future; Expected date: 08/29/2024  -     Basic metabolic panel; Future; Expected date: 08/29/2024  -     Albumin / creatinine urine ratio; Future; Expected date: 08/29/2024  -     Lipid Panel with Direct LDL reflex; Future; Expected date: 08/29/2024    15. Prostate cancer screening  -     PSA, Total Screen; Future           Subjective      Pt is here for interval visit and evaluation of multiple medical problems, review of medications, labs, Health Maintenance and any recent specialty consults cardiology        Review of Systems   Constitutional:  Negative for appetite change, chills, fatigue and fever.   Respiratory:  Negative for cough, chest tightness and shortness of breath.    Cardiovascular:  Negative for chest pain, palpitations and leg swelling.   Gastrointestinal:  Negative for abdominal pain, constipation, diarrhea, nausea and vomiting.   Genitourinary:  Negative for difficulty urinating and frequency.   Musculoskeletal:  Negative for arthralgias, back pain, gait problem and neck pain.   Skin:  Negative for rash.   Neurological:  Negative for dizziness, weakness, light-headedness, numbness and headaches.   Hematological:  Does not bruise/bleed easily.   Psychiatric/Behavioral:  Negative for dysphoric mood and sleep disturbance. The patient is not nervous/anxious.        Current Outpatient Medications on File Prior to Visit   Medication Sig   • aspirin (ECOTRIN LOW STRENGTH) 81 mg EC tablet Take 81 mg by mouth daily   • atorvastatin (LIPITOR) 20 mg tablet TAKE 1 TABLET AT BEDTIME   • bumetanide (BUMEX) 2 mg tablet TAKE 1 TABLET TWICE DAILY   • calcium carbonate (TUMS) 500 mg chewable tablet Chew 2 tablets (1,000 mg total) daily as needed for indigestion or heartburn   • losartan (COZAAR) 25 mg tablet TAKE 1 TABLET (25 MG TOTAL) BY MOUTH DAILY.   • metFORMIN (GLUCOPHAGE) 500 mg tablet TAKE 1 TABLET TWICE DAILY WITH MEALS   • metoprolol tartrate (LOPRESSOR) 25 mg tablet TAKE 1 TABLET EVERY  "12 HOURS   • Misc. Devices (Wheelchair) MISC Use daily   • potassium chloride (Klor-Con M20) 20 mEq tablet TAKE 1 TABLET TWICE DAILY   • senna (SENOKOT) 8.6 mg Take 1 tablet (8.6 mg total) by mouth daily   • sitaGLIPtin (JANUVIA) 100 mg tablet Take 1 tablet (100 mg total) by mouth daily   • [DISCONTINUED] Empagliflozin (JARDIANCE) 10 MG TABS tablet Take 1 tablet (10 mg total) by mouth daily (Patient not taking: Reported on 5/1/2024)       Objective     /84 (BP Location: Left arm, Patient Position: Sitting, Cuff Size: Large)   Pulse 85   Temp 98 °F (36.7 °C) (Tympanic)   Resp 16   Ht 5' 7\" (1.702 m)   Wt (!) 196 kg (433 lb)   SpO2 94%   BMI 67.82 kg/m²     Physical Exam  Vitals reviewed.   Constitutional:       General: He is not in acute distress.     Appearance: Normal appearance. He is well-developed. He is obese. He is not ill-appearing.   HENT:      Mouth/Throat:      Mouth: Mucous membranes are moist.   Eyes:      Extraocular Movements: Extraocular movements intact.      Conjunctiva/sclera: Conjunctivae normal.      Pupils: Pupils are equal, round, and reactive to light.   Neck:      Thyroid: No thyromegaly.      Vascular: No carotid bruit.   Cardiovascular:      Rate and Rhythm: Normal rate and regular rhythm.      Pulses: Normal pulses.      Heart sounds: Normal heart sounds. No murmur heard.  Pulmonary:      Effort: Pulmonary effort is normal.      Breath sounds: Normal breath sounds.   Chest:      Chest wall: No tenderness.   Abdominal:      General: Bowel sounds are normal. There is no distension.      Palpations: Abdomen is soft.      Tenderness: There is no abdominal tenderness.   Musculoskeletal:      Cervical back: Normal range of motion and neck supple.      Right lower leg: No edema.      Left lower leg: No edema.   Lymphadenopathy:      Cervical: No cervical adenopathy.   Skin:     General: Skin is warm and dry.   Neurological:      General: No focal deficit present.      Mental Status: " He is alert and oriented to person, place, and time. Mental status is at baseline.      Cranial Nerves: No cranial nerve deficit.   Psychiatric:         Mood and Affect: Mood normal.         Behavior: Behavior normal.       Felisa Ramirez MD

## 2024-05-01 NOTE — ASSESSMENT & PLAN NOTE
Wt Readings from Last 3 Encounters:   05/01/24 (!) 196 kg (433 lb)   12/14/23 (!) 196 kg (433 lb)   11/06/23 (!) 196 kg (433 lb)     Compensated cardiology note reviewed

## 2024-05-01 NOTE — ASSESSMENT & PLAN NOTE
Lab Results   Component Value Date    EGFR 81 04/24/2024    EGFR 83 12/12/2023    EGFR 93 10/18/2023    CREATININE 0.98 04/24/2024    CREATININE 0.96 12/12/2023    CREATININE 0.84 10/18/2023   Stable

## 2024-05-20 DIAGNOSIS — E11.65 TYPE 2 DIABETES MELLITUS WITH HYPERGLYCEMIA, WITHOUT LONG-TERM CURRENT USE OF INSULIN (HCC): ICD-10-CM

## 2024-05-20 DIAGNOSIS — I50.31 ACUTE DIASTOLIC HEART FAILURE (HCC): ICD-10-CM

## 2024-05-21 RX ORDER — LOSARTAN POTASSIUM 25 MG/1
25 TABLET ORAL DAILY
Qty: 90 TABLET | Refills: 1 | Status: SHIPPED | OUTPATIENT
Start: 2024-05-21

## 2024-05-21 RX ORDER — POTASSIUM CHLORIDE 20 MEQ/1
20 TABLET, EXTENDED RELEASE ORAL 2 TIMES DAILY
Qty: 180 TABLET | Refills: 1 | Status: SHIPPED | OUTPATIENT
Start: 2024-05-21

## 2024-06-04 ENCOUNTER — VBI (OUTPATIENT)
Dept: ADMINISTRATIVE | Facility: OTHER | Age: 64
End: 2024-06-04

## 2024-06-06 DIAGNOSIS — Z76.0 ENCOUNTER FOR ISSUE OF REPEAT PRESCRIPTION: ICD-10-CM

## 2024-06-06 RX ORDER — ATORVASTATIN CALCIUM 20 MG/1
TABLET, FILM COATED ORAL
Qty: 90 TABLET | Refills: 1 | Status: SHIPPED | OUTPATIENT
Start: 2024-06-06

## 2024-08-09 ENCOUNTER — VBI (OUTPATIENT)
Dept: ADMINISTRATIVE | Facility: OTHER | Age: 64
End: 2024-08-09

## 2024-08-09 NOTE — TELEPHONE ENCOUNTER
08/09/24 8:36 AM     Chart reviewed for eGFR, Hemoglobin A1c, and Microalbumin Creatinine Urine Ratio OR Albumin Creatinine Urine Ratio  ; nothing is submitted to the patient's insurance at this time.     Ada Peres   [facility]

## 2024-08-15 DIAGNOSIS — I50.31 ACUTE DIASTOLIC HEART FAILURE (HCC): ICD-10-CM

## 2024-08-15 RX ORDER — BUMETANIDE 2 MG/1
2 TABLET ORAL 2 TIMES DAILY
Qty: 180 TABLET | Refills: 1 | Status: SHIPPED | OUTPATIENT
Start: 2024-08-15

## 2024-08-23 DIAGNOSIS — E11.65 TYPE 2 DIABETES MELLITUS WITH HYPERGLYCEMIA, WITHOUT LONG-TERM CURRENT USE OF INSULIN (HCC): Primary | ICD-10-CM

## 2024-08-23 RX ORDER — GLIMEPIRIDE 1 MG/1
1 TABLET ORAL 2 TIMES DAILY
Qty: 200 TABLET | Refills: 3 | Status: SHIPPED | OUTPATIENT
Start: 2024-08-23

## 2024-10-27 DIAGNOSIS — Z76.0 ENCOUNTER FOR ISSUE OF REPEAT PRESCRIPTION: ICD-10-CM

## 2024-10-27 DIAGNOSIS — E11.65 TYPE 2 DIABETES MELLITUS WITH HYPERGLYCEMIA, WITHOUT LONG-TERM CURRENT USE OF INSULIN (HCC): ICD-10-CM

## 2024-10-27 DIAGNOSIS — I50.31 ACUTE DIASTOLIC HEART FAILURE (HCC): ICD-10-CM

## 2024-10-29 RX ORDER — ATORVASTATIN CALCIUM 20 MG/1
TABLET, FILM COATED ORAL
Qty: 90 TABLET | Refills: 0 | Status: SHIPPED | OUTPATIENT
Start: 2024-10-29

## 2024-10-29 RX ORDER — LOSARTAN POTASSIUM 25 MG/1
25 TABLET ORAL DAILY
Qty: 90 TABLET | Refills: 1 | Status: SHIPPED | OUTPATIENT
Start: 2024-10-29

## 2024-10-29 RX ORDER — METOPROLOL TARTRATE 25 MG/1
25 TABLET, FILM COATED ORAL EVERY 12 HOURS
Qty: 180 TABLET | Refills: 1 | Status: SHIPPED | OUTPATIENT
Start: 2024-10-29

## 2024-11-14 ENCOUNTER — VBI (OUTPATIENT)
Dept: ADMINISTRATIVE | Facility: OTHER | Age: 64
End: 2024-11-14

## 2024-11-14 NOTE — TELEPHONE ENCOUNTER
11/14/24 10:43 AM     Chart reviewed for hemoglobin A1C, Blood pressure was/were submitted to the patient's insurance.     Aad Peres   [facility]

## 2024-11-20 DIAGNOSIS — I50.31 ACUTE DIASTOLIC HEART FAILURE (HCC): ICD-10-CM

## 2024-11-20 RX ORDER — POTASSIUM CHLORIDE 1500 MG/1
20 TABLET, EXTENDED RELEASE ORAL 2 TIMES DAILY
Qty: 180 TABLET | Refills: 3 | Status: SHIPPED | OUTPATIENT
Start: 2024-11-20

## 2025-01-10 DIAGNOSIS — Z76.0 ENCOUNTER FOR ISSUE OF REPEAT PRESCRIPTION: ICD-10-CM

## 2025-01-10 DIAGNOSIS — E11.65 TYPE 2 DIABETES MELLITUS WITH HYPERGLYCEMIA, WITHOUT LONG-TERM CURRENT USE OF INSULIN (HCC): ICD-10-CM

## 2025-01-10 DIAGNOSIS — I50.31 ACUTE DIASTOLIC HEART FAILURE (HCC): ICD-10-CM

## 2025-01-13 ENCOUNTER — VBI (OUTPATIENT)
Dept: ADMINISTRATIVE | Facility: OTHER | Age: 65
End: 2025-01-13

## 2025-01-13 RX ORDER — BUMETANIDE 2 MG/1
2 TABLET ORAL 2 TIMES DAILY
Qty: 180 TABLET | Refills: 3 | Status: SHIPPED | OUTPATIENT
Start: 2025-01-13

## 2025-01-13 RX ORDER — ATORVASTATIN CALCIUM 20 MG/1
20 TABLET, FILM COATED ORAL
Qty: 90 TABLET | Refills: 3 | Status: SHIPPED | OUTPATIENT
Start: 2025-01-13

## 2025-01-13 NOTE — TELEPHONE ENCOUNTER
01/13/25 12:45 PM     Chart reviewed for Microalbumin Creatinine Urine Ratio OR Albumin Creatinine Urine Ratio  ; nothing is submitted to the patient's insurance at this time.     Ada Peres   [facility]

## 2025-03-27 DIAGNOSIS — E11.65 TYPE 2 DIABETES MELLITUS WITH HYPERGLYCEMIA, WITHOUT LONG-TERM CURRENT USE OF INSULIN (HCC): ICD-10-CM

## 2025-03-27 DIAGNOSIS — I50.31 ACUTE DIASTOLIC HEART FAILURE (HCC): ICD-10-CM

## 2025-03-27 RX ORDER — METOPROLOL TARTRATE 25 MG/1
25 TABLET, FILM COATED ORAL EVERY 12 HOURS
Qty: 180 TABLET | Refills: 0 | Status: SHIPPED | OUTPATIENT
Start: 2025-03-27

## 2025-03-27 RX ORDER — LOSARTAN POTASSIUM 25 MG/1
25 TABLET ORAL DAILY
Qty: 90 TABLET | Refills: 0 | Status: SHIPPED | OUTPATIENT
Start: 2025-03-27

## 2025-04-05 DIAGNOSIS — E11.65 TYPE 2 DIABETES MELLITUS WITH HYPERGLYCEMIA, WITHOUT LONG-TERM CURRENT USE OF INSULIN (HCC): ICD-10-CM

## 2025-04-07 RX ORDER — GLIMEPIRIDE 1 MG/1
1 TABLET ORAL 2 TIMES DAILY
Qty: 60 TABLET | Refills: 0 | Status: SHIPPED | OUTPATIENT
Start: 2025-04-07

## 2025-04-07 NOTE — TELEPHONE ENCOUNTER
Gave 1 month second courtesy refill meena pt to see if he is still ours needs  30min visit and physical

## 2025-04-24 ENCOUNTER — DOCUMENTATION (OUTPATIENT)
Dept: ADMINISTRATIVE | Facility: OTHER | Age: 65
End: 2025-04-24

## 2025-04-24 NOTE — PROGRESS NOTES
04/24/25 2:06 PM    Annual Wellness Visit outreach is not required, the practice has sent a reminder message/ mail for AWV to the patient.    Thank you.  Andrew Jarrett MA  [facility]

## 2025-05-21 NOTE — ED ATTENDING ATTESTATION
6/7/2021  Marita Chairez DO, saw and evaluated the patient  I have discussed the patient with the resident/non-physician practitioner and agree with the resident's/non-physician practitioner's findings, Plan of Care, and MDM as documented in the resident's/non-physician practitioner's note, except where noted  All available labs and Radiology studies were reviewed  I was present for key portions of any procedure(s) performed by the resident/non-physician practitioner and I was immediately available to provide assistance  At this point I agree with the current assessment done in the Emergency Department  I have conducted an independent evaluation of this patient a history and physical is as follows: On examination:  The patient is awake, alert and oriented  Patient is morbidly obese  Conversationally dyspneic  HEENT: Normocephalic/atraumatic  External examination of the ears is unremarkable  Pupils are equal round and reactive to light, there is no conjunctival injection or scleral icterus noted  Nares are patent without rhinorrhea  The oropharynx is moist without injection  The neck is supple  Lungs: crackles present in all lung fields  Distant lung sounds due to body habitus  Heart: regular rate  Muffled heart tones due to body habitus  Abdomen: Soft and nontender  Morbid obesity makes physical exam difficult  Musculoskeletal: Normal range of motion with grossly normal strength  Compression stockings bilateral lower extremities  +2 pitting edema at the knee  Distal pulses equal bilaterally  Neuro: Cranial nerves II through XII grossly intact   Nonfocal exam  Skin: No rash noted  Psych: Mood and affect normal    ED Course      Wt Readings from Last 3 Encounters:   06/07/21 (!) 204 kg (450 lb)   06/07/21 (!) 204 kg (450 lb)   04/13/21 (!) 207 kg (457 lb)           Critical Care Time  ECG 12 Lead Documentation Only    Date/Time: 6/7/2021 8:56 PM  Performed by: Reno Rivas DO  Authorized by: Jesika Liao DO     Indications / Diagnosis:  Shortness of breath  ECG reviewed by me, the ED Provider: yes    Patient location:  ED  Previous ECG:     Previous ECG:  Compared to current  Comments:      Normal sinus rhythm at 61 beats per minute with occasional PACs  Normal axis, normal intervals, no ST T wave abnormalities suggestive of ischemia  QTC is normal   n sinus rhythm is new when compared to prior from 05/30/2021 when he was in atrial fibrillation  A/P  1) Shortness of breath - Likely due to volume overload  Will treat with diuretics and plan admit for diuresis and further workup  NULL

## 2025-06-08 DIAGNOSIS — I50.31 ACUTE DIASTOLIC HEART FAILURE (HCC): ICD-10-CM

## 2025-06-08 DIAGNOSIS — E11.65 TYPE 2 DIABETES MELLITUS WITH HYPERGLYCEMIA, WITHOUT LONG-TERM CURRENT USE OF INSULIN (HCC): ICD-10-CM

## 2025-06-09 RX ORDER — LOSARTAN POTASSIUM 25 MG/1
25 TABLET ORAL DAILY
Qty: 30 TABLET | Refills: 1 | Status: SHIPPED | OUTPATIENT
Start: 2025-06-09

## 2025-06-09 RX ORDER — METOPROLOL TARTRATE 25 MG/1
25 TABLET, FILM COATED ORAL EVERY 12 HOURS
Qty: 60 TABLET | Refills: 1 | Status: SHIPPED | OUTPATIENT
Start: 2025-06-09

## 2025-06-09 NOTE — TELEPHONE ENCOUNTER
Last seen over 1 yr ago gave 1 month and 1 refill pt due for visit and medicare wellness please schedule

## 2025-06-28 DIAGNOSIS — E11.65 TYPE 2 DIABETES MELLITUS WITH HYPERGLYCEMIA, WITHOUT LONG-TERM CURRENT USE OF INSULIN (HCC): ICD-10-CM

## 2025-06-30 RX ORDER — GLIMEPIRIDE 1 MG/1
1 TABLET ORAL 2 TIMES DAILY
Qty: 60 TABLET | Refills: 0 | Status: SHIPPED | OUTPATIENT
Start: 2025-06-30

## 2025-06-30 NOTE — TELEPHONE ENCOUNTER
Last visit over 1 yr ago is due for medicare wellness and all the diabetic care gaps will give 1 month again (already had courtesy refill)

## 2025-07-08 NOTE — ASSESSMENT & PLAN NOTE
Refer to [facility]  [Bilateral] : knee bilaterally [5___] : hamstring 5[unfilled]/5 [Equivocal] : equivocal [place] [] : ligamentously stable

## 2025-07-16 ENCOUNTER — VBI (OUTPATIENT)
Dept: ADMINISTRATIVE | Facility: OTHER | Age: 65
End: 2025-07-16

## 2025-07-16 NOTE — TELEPHONE ENCOUNTER
07/16/25 12:39 PM     Chart reviewed for Diabetic Eye Exam ; nothing is submitted to the patient's insurance at this time.     Miguel Chicas MA   [facility]

## 2025-07-21 DIAGNOSIS — E11.65 TYPE 2 DIABETES MELLITUS WITH HYPERGLYCEMIA, WITHOUT LONG-TERM CURRENT USE OF INSULIN (HCC): ICD-10-CM

## 2025-07-22 RX ORDER — GLIMEPIRIDE 1 MG/1
1 TABLET ORAL 2 TIMES DAILY
Qty: 60 TABLET | Refills: 0 | Status: SHIPPED | OUTPATIENT
Start: 2025-07-22

## 2025-07-25 ENCOUNTER — VBI (OUTPATIENT)
Dept: ADMINISTRATIVE | Facility: OTHER | Age: 65
End: 2025-07-25

## 2025-07-25 NOTE — TELEPHONE ENCOUNTER
07/25/25 10:34 AM     Chart reviewed for Hemoglobin A1c ; nothing is submitted to the patient's insurance at this time.     Miguel Chicas MA   [facility]

## 2025-07-31 DIAGNOSIS — I50.31 ACUTE DIASTOLIC HEART FAILURE (HCC): ICD-10-CM

## 2025-07-31 DIAGNOSIS — E11.65 TYPE 2 DIABETES MELLITUS WITH HYPERGLYCEMIA, WITHOUT LONG-TERM CURRENT USE OF INSULIN (HCC): ICD-10-CM

## 2025-08-01 DIAGNOSIS — I50.31 ACUTE DIASTOLIC HEART FAILURE (HCC): ICD-10-CM

## 2025-08-01 RX ORDER — METOPROLOL TARTRATE 25 MG/1
25 TABLET, FILM COATED ORAL EVERY 12 HOURS
Qty: 120 TABLET | Refills: 5 | Status: SHIPPED | OUTPATIENT
Start: 2025-08-01 | End: 2025-08-01 | Stop reason: SDUPTHER

## 2025-08-01 RX ORDER — LOSARTAN POTASSIUM 25 MG/1
25 TABLET ORAL DAILY
Qty: 30 TABLET | Refills: 0 | Status: SHIPPED | OUTPATIENT
Start: 2025-08-01

## 2025-08-01 RX ORDER — METOPROLOL TARTRATE 25 MG/1
25 TABLET, FILM COATED ORAL EVERY 12 HOURS SCHEDULED
Qty: 60 TABLET | Refills: 0 | Status: SHIPPED | OUTPATIENT
Start: 2025-08-01

## 2025-08-07 ENCOUNTER — VBI (OUTPATIENT)
Dept: ADMINISTRATIVE | Facility: OTHER | Age: 65
End: 2025-08-07

## 2025-08-18 ENCOUNTER — VBI (OUTPATIENT)
Dept: ADMINISTRATIVE | Facility: OTHER | Age: 65
End: 2025-08-18